# Patient Record
Sex: MALE | Race: WHITE | NOT HISPANIC OR LATINO | URBAN - METROPOLITAN AREA
[De-identification: names, ages, dates, MRNs, and addresses within clinical notes are randomized per-mention and may not be internally consistent; named-entity substitution may affect disease eponyms.]

---

## 2023-07-18 ENCOUNTER — TELEPHONE (OUTPATIENT)
Dept: EMERGENCY MEDICINE | Facility: HOSPITAL | Age: 53
End: 2023-07-18

## 2023-07-18 DIAGNOSIS — Z00.00 ANNUAL PHYSICAL EXAM: Primary | ICD-10-CM

## 2023-10-09 NOTE — PROGRESS NOTES
"Subjective:       Patient ID: Chris Nava is a 53 y.o. male.    Chief Complaint: Establish Care, Referral (Pain management ), and Arthritis (Lt Hip & both shoulder )    Chris Nava is a 53 y.o. male who presents today to establish care.     Patient around the new year was 405 lbs and is now down to 328 lbs through diet. He was started on metformin and ozempic by a weight loss clinic in the community. He finds this has not aided in weight loss as much as his dietary restrictions. Finds exercise difficult and typically ambulates with cane due to "bone on bone" arthritis in his left hip and shoulders. He has been seeing Dr. Warren in the community but he is not a candidate for surgery until he is down to 270 lbs. He is currently in [facility]. He takes Cymbalta for pain 80 mg per day and Mobic. Notes he is ingesting marijuana of an evening to aid in pain control and sleep. He is a .     Past Medical History:   Diagnosis Date    Arthritis     Kidney stones      Past Surgical History:   Procedure Laterality Date    CYSTOSCOPY W/ LASER LITHOTRIPSY      VASECTOMY       Social History     Socioeconomic History    Marital status:    Tobacco Use    Smoking status: Never     Passive exposure: Never    Smokeless tobacco: Never   Substance and Sexual Activity    Alcohol use: Yes     Comment: Maybe wine at dinner 1x per week, maybe bourbon 1 night p/wk    Drug use: Yes     Frequency: 7.0 times per week     Types: Marijuana     Comment: Sleep aid only for arthritic purposes. Use for 2 months only    Sexual activity: Yes     Partners: Female     Birth control/protection: Post-menopausal     Comment: I have a vasectomy, she is post menopausal     Family History   Problem Relation Age of Onset    Arthritis Father     Cancer Father     Hearing loss Father     Hypertension Father     Cancer Maternal Grandmother     Cancer Paternal Grandfather     Arthritis Paternal Grandmother  " "   Stroke Paternal Grandmother     Asthma Daughter     Hearing loss Maternal Uncle     Hypertension Brother     Hypertension Brother          Health Maintenance    Flu Vaccine: 10/11/2023   Tetanus/Tdap: 10/11/2023   Hepatitis C Screen: 10/11/2023   HIV Screen: Declined   PSA: 10/11/2023       Review of patient's allergies indicates:  No Known Allergies    Review of Systems   Constitutional:  Negative for chills and fever.   Respiratory:  Negative for cough and shortness of breath.    Cardiovascular:  Negative for chest pain.   Musculoskeletal:  Positive for joint pain.   Neurological:  Negative for dizziness and headaches.     Objective:   /86 (BP Location: Right arm, Patient Position: Sitting, BP Method: Large (Manual))   Pulse 71   Temp 96.4 °F (35.8 °C) (Temporal)   Resp 18   Ht 5' 9" (1.753 m)   Wt (!) 148.8 kg (328 lb)   SpO2 96%   BMI 48.44 kg/m²     Physical Exam  Vitals reviewed.   Constitutional:       Appearance: Normal appearance.   HENT:      Head: Normocephalic and atraumatic.   Cardiovascular:      Rate and Rhythm: Normal rate and regular rhythm.      Heart sounds: Normal heart sounds. No murmur heard.  Pulmonary:      Effort: Pulmonary effort is normal.      Breath sounds: Normal breath sounds. No wheezing.   Skin:     General: Skin is warm and dry.   Neurological:      Mental Status: He is alert and oriented to person, place, and time.       Assessment and Plan:       1. Encounter for annual health examination    --Nutrition: Discussed the importance of moderate caffeine intake. Adhering to a low sodium, low saturated fat/low cholesterol diet.   --Exercise: Discussed the importance of regular exercise. At least 30 minutes 5 days per week.   --Substance Abuse: Discussed cessation and resources available to assist.  --Immunizations reviewed.  --Discussed benefits of maintaining up to date health maintenance.    - Hepatitis C Antibody; Future  - CBC Auto Differential; Future  - " Comprehensive Metabolic Panel; Future  - Lipid Panel; Future  - TSH; Future  - PSA, Screening; Future  - Hemoglobin A1C; Future    2. Arthritis    Chronic, pain not well controlled. Needs surgery but not a candidate at this time due to weight. Continue Cymbalta and Mobic.      - Ambulatory referral/consult to Pain Clinic; Future  - DULoxetine (CYMBALTA) 60 MG capsule; Take 1 capsule (60 mg total) by mouth once daily.  Dispense: 90 capsule; Refill: 3  - DULoxetine (CYMBALTA) 20 MG capsule; Take 1 capsule (20 mg total) by mouth once daily.  Dispense: 90 capsule; Refill: 3    3. Class 3 severe obesity without serious comorbidity with body mass index (BMI) of 45.0 to 49.9 in adult, unspecified obesity type    Chronic, stable, continue current medications, diet and exercise     - Hemoglobin A1C; Future    4. Personal history of kidney stones    Chronic, stable, continue current medications.  Patient on Allopurinol, Lisinopril and Potassium Citrate for Uric Acid stones.     5. Need for vaccination    - (In Office Administered) Td Vaccine - Preservative Free    6. Encounter for colorectal cancer screening    - Ambulatory referral/consult to Endo Procedure ; Future

## 2023-10-11 ENCOUNTER — LAB VISIT (OUTPATIENT)
Dept: LAB | Facility: HOSPITAL | Age: 53
End: 2023-10-11
Attending: NURSE PRACTITIONER
Payer: COMMERCIAL

## 2023-10-11 ENCOUNTER — OFFICE VISIT (OUTPATIENT)
Dept: INTERNAL MEDICINE | Facility: CLINIC | Age: 53
End: 2023-10-11
Payer: COMMERCIAL

## 2023-10-11 VITALS
HEIGHT: 69 IN | DIASTOLIC BLOOD PRESSURE: 86 MMHG | HEART RATE: 71 BPM | RESPIRATION RATE: 18 BRPM | TEMPERATURE: 96 F | BODY MASS INDEX: 46.65 KG/M2 | SYSTOLIC BLOOD PRESSURE: 124 MMHG | WEIGHT: 315 LBS | OXYGEN SATURATION: 96 %

## 2023-10-11 DIAGNOSIS — Z12.11 ENCOUNTER FOR COLORECTAL CANCER SCREENING: ICD-10-CM

## 2023-10-11 DIAGNOSIS — Z87.442 PERSONAL HISTORY OF KIDNEY STONES: ICD-10-CM

## 2023-10-11 DIAGNOSIS — Z12.12 ENCOUNTER FOR COLORECTAL CANCER SCREENING: ICD-10-CM

## 2023-10-11 DIAGNOSIS — Z00.00 ENCOUNTER FOR ANNUAL HEALTH EXAMINATION: ICD-10-CM

## 2023-10-11 DIAGNOSIS — M19.90 ARTHRITIS: ICD-10-CM

## 2023-10-11 DIAGNOSIS — Z23 NEED FOR VACCINATION: ICD-10-CM

## 2023-10-11 DIAGNOSIS — E66.01 CLASS 3 SEVERE OBESITY WITHOUT SERIOUS COMORBIDITY WITH BODY MASS INDEX (BMI) OF 45.0 TO 49.9 IN ADULT, UNSPECIFIED OBESITY TYPE: ICD-10-CM

## 2023-10-11 DIAGNOSIS — Z00.00 ENCOUNTER FOR ANNUAL HEALTH EXAMINATION: Primary | ICD-10-CM

## 2023-10-11 PROBLEM — E66.813 CLASS 3 SEVERE OBESITY WITHOUT SERIOUS COMORBIDITY WITH BODY MASS INDEX (BMI) OF 45.0 TO 49.9 IN ADULT: Status: ACTIVE | Noted: 2023-10-11

## 2023-10-11 LAB
ALBUMIN SERPL BCP-MCNC: 3.8 G/DL (ref 3.5–5.2)
ALP SERPL-CCNC: 68 U/L (ref 55–135)
ALT SERPL W/O P-5'-P-CCNC: 25 U/L (ref 10–44)
ANION GAP SERPL CALC-SCNC: 6 MMOL/L (ref 8–16)
AST SERPL-CCNC: 21 U/L (ref 10–40)
BASOPHILS # BLD AUTO: 0.04 K/UL (ref 0–0.2)
BASOPHILS NFR BLD: 0.5 % (ref 0–1.9)
BILIRUB SERPL-MCNC: 0.9 MG/DL (ref 0.1–1)
BUN SERPL-MCNC: 15 MG/DL (ref 6–20)
CALCIUM SERPL-MCNC: 9.3 MG/DL (ref 8.7–10.5)
CHLORIDE SERPL-SCNC: 105 MMOL/L (ref 95–110)
CHOLEST SERPL-MCNC: 187 MG/DL (ref 120–199)
CHOLEST/HDLC SERPL: 4.3 {RATIO} (ref 2–5)
CO2 SERPL-SCNC: 26 MMOL/L (ref 23–29)
COMPLEXED PSA SERPL-MCNC: 2.3 NG/ML (ref 0–4)
CREAT SERPL-MCNC: 0.8 MG/DL (ref 0.5–1.4)
DIFFERENTIAL METHOD: ABNORMAL
EOSINOPHIL # BLD AUTO: 0.3 K/UL (ref 0–0.5)
EOSINOPHIL NFR BLD: 3.1 % (ref 0–8)
ERYTHROCYTE [DISTWIDTH] IN BLOOD BY AUTOMATED COUNT: 15 % (ref 11.5–14.5)
EST. GFR  (NO RACE VARIABLE): >60 ML/MIN/1.73 M^2
ESTIMATED AVG GLUCOSE: 100 MG/DL (ref 68–131)
GLUCOSE SERPL-MCNC: 89 MG/DL (ref 70–110)
HBA1C MFR BLD: 5.1 % (ref 4–5.6)
HCT VFR BLD AUTO: 53.4 % (ref 40–54)
HCV AB SERPL QL IA: NORMAL
HDLC SERPL-MCNC: 44 MG/DL (ref 40–75)
HDLC SERPL: 23.5 % (ref 20–50)
HGB BLD-MCNC: 16.6 G/DL (ref 14–18)
IMM GRANULOCYTES # BLD AUTO: 0.04 K/UL (ref 0–0.04)
IMM GRANULOCYTES NFR BLD AUTO: 0.5 % (ref 0–0.5)
LDLC SERPL CALC-MCNC: 127 MG/DL (ref 63–159)
LYMPHOCYTES # BLD AUTO: 2 K/UL (ref 1–4.8)
LYMPHOCYTES NFR BLD: 22.8 % (ref 18–48)
MCH RBC QN AUTO: 27.7 PG (ref 27–31)
MCHC RBC AUTO-ENTMCNC: 31.1 G/DL (ref 32–36)
MCV RBC AUTO: 89 FL (ref 82–98)
MONOCYTES # BLD AUTO: 0.7 K/UL (ref 0.3–1)
MONOCYTES NFR BLD: 8.4 % (ref 4–15)
NEUTROPHILS # BLD AUTO: 5.7 K/UL (ref 1.8–7.7)
NEUTROPHILS NFR BLD: 64.7 % (ref 38–73)
NONHDLC SERPL-MCNC: 143 MG/DL
NRBC BLD-RTO: 0 /100 WBC
PLATELET # BLD AUTO: 250 K/UL (ref 150–450)
PMV BLD AUTO: 10.6 FL (ref 9.2–12.9)
POTASSIUM SERPL-SCNC: 4.5 MMOL/L (ref 3.5–5.1)
PROT SERPL-MCNC: 6.9 G/DL (ref 6–8.4)
RBC # BLD AUTO: 6 M/UL (ref 4.6–6.2)
SODIUM SERPL-SCNC: 137 MMOL/L (ref 136–145)
TRIGL SERPL-MCNC: 80 MG/DL (ref 30–150)
TSH SERPL DL<=0.005 MIU/L-ACNC: 0.9 UIU/ML (ref 0.4–4)
WBC # BLD AUTO: 8.78 K/UL (ref 3.9–12.7)

## 2023-10-11 PROCEDURE — 99999 PR PBB SHADOW E&M-EST. PATIENT-LVL V: ICD-10-PCS | Mod: PBBFAC,,, | Performed by: NURSE PRACTITIONER

## 2023-10-11 PROCEDURE — 86803 HEPATITIS C AB TEST: CPT | Performed by: NURSE PRACTITIONER

## 2023-10-11 PROCEDURE — 90714 TD VACCINE GREATER THAN OR EQUAL TO 7YO PRESERVATIVE FREE IM: ICD-10-PCS | Mod: S$GLB,,, | Performed by: NURSE PRACTITIONER

## 2023-10-11 PROCEDURE — 3079F DIAST BP 80-89 MM HG: CPT | Mod: CPTII,S$GLB,, | Performed by: NURSE PRACTITIONER

## 2023-10-11 PROCEDURE — 3079F PR MOST RECENT DIASTOLIC BLOOD PRESSURE 80-89 MM HG: ICD-10-PCS | Mod: CPTII,S$GLB,, | Performed by: NURSE PRACTITIONER

## 2023-10-11 PROCEDURE — 90472 TD VACCINE GREATER THAN OR EQUAL TO 7YO PRESERVATIVE FREE IM: ICD-10-PCS | Mod: S$GLB,,, | Performed by: NURSE PRACTITIONER

## 2023-10-11 PROCEDURE — 99386 PREV VISIT NEW AGE 40-64: CPT | Mod: 25,S$GLB,, | Performed by: NURSE PRACTITIONER

## 2023-10-11 PROCEDURE — 36415 COLL VENOUS BLD VENIPUNCTURE: CPT | Mod: PO | Performed by: NURSE PRACTITIONER

## 2023-10-11 PROCEDURE — 4010F PR ACE/ARB THEARPY RXD/TAKEN: ICD-10-PCS | Mod: CPTII,S$GLB,, | Performed by: NURSE PRACTITIONER

## 2023-10-11 PROCEDURE — 99386 PR PREVENTIVE VISIT,NEW,40-64: ICD-10-PCS | Mod: 25,S$GLB,, | Performed by: NURSE PRACTITIONER

## 2023-10-11 PROCEDURE — 3008F BODY MASS INDEX DOCD: CPT | Mod: CPTII,S$GLB,, | Performed by: NURSE PRACTITIONER

## 2023-10-11 PROCEDURE — 1160F PR REVIEW ALL MEDS BY PRESCRIBER/CLIN PHARMACIST DOCUMENTED: ICD-10-PCS | Mod: CPTII,S$GLB,, | Performed by: NURSE PRACTITIONER

## 2023-10-11 PROCEDURE — 90686 IIV4 VACC NO PRSV 0.5 ML IM: CPT | Mod: S$GLB,,, | Performed by: NURSE PRACTITIONER

## 2023-10-11 PROCEDURE — 1159F MED LIST DOCD IN RCRD: CPT | Mod: CPTII,S$GLB,, | Performed by: NURSE PRACTITIONER

## 2023-10-11 PROCEDURE — 99999 PR PBB SHADOW E&M-EST. PATIENT-LVL V: CPT | Mod: PBBFAC,,, | Performed by: NURSE PRACTITIONER

## 2023-10-11 PROCEDURE — 90471 FLU VACCINE (QUAD) GREATER THAN OR EQUAL TO 3YO PRESERVATIVE FREE IM: ICD-10-PCS | Mod: S$GLB,,, | Performed by: NURSE PRACTITIONER

## 2023-10-11 PROCEDURE — 80061 LIPID PANEL: CPT | Performed by: NURSE PRACTITIONER

## 2023-10-11 PROCEDURE — 4010F ACE/ARB THERAPY RXD/TAKEN: CPT | Mod: CPTII,S$GLB,, | Performed by: NURSE PRACTITIONER

## 2023-10-11 PROCEDURE — 90686 FLU VACCINE (QUAD) GREATER THAN OR EQUAL TO 3YO PRESERVATIVE FREE IM: ICD-10-PCS | Mod: S$GLB,,, | Performed by: NURSE PRACTITIONER

## 2023-10-11 PROCEDURE — 90471 IMMUNIZATION ADMIN: CPT | Mod: S$GLB,,, | Performed by: NURSE PRACTITIONER

## 2023-10-11 PROCEDURE — 1159F PR MEDICATION LIST DOCUMENTED IN MEDICAL RECORD: ICD-10-PCS | Mod: CPTII,S$GLB,, | Performed by: NURSE PRACTITIONER

## 2023-10-11 PROCEDURE — 80053 COMPREHEN METABOLIC PANEL: CPT | Performed by: NURSE PRACTITIONER

## 2023-10-11 PROCEDURE — 3008F PR BODY MASS INDEX (BMI) DOCUMENTED: ICD-10-PCS | Mod: CPTII,S$GLB,, | Performed by: NURSE PRACTITIONER

## 2023-10-11 PROCEDURE — 3074F PR MOST RECENT SYSTOLIC BLOOD PRESSURE < 130 MM HG: ICD-10-PCS | Mod: CPTII,S$GLB,, | Performed by: NURSE PRACTITIONER

## 2023-10-11 PROCEDURE — 85025 COMPLETE CBC W/AUTO DIFF WBC: CPT | Performed by: NURSE PRACTITIONER

## 2023-10-11 PROCEDURE — 84443 ASSAY THYROID STIM HORMONE: CPT | Performed by: NURSE PRACTITIONER

## 2023-10-11 PROCEDURE — 3074F SYST BP LT 130 MM HG: CPT | Mod: CPTII,S$GLB,, | Performed by: NURSE PRACTITIONER

## 2023-10-11 PROCEDURE — 90714 TD VACC NO PRESV 7 YRS+ IM: CPT | Mod: S$GLB,,, | Performed by: NURSE PRACTITIONER

## 2023-10-11 PROCEDURE — 90472 IMMUNIZATION ADMIN EACH ADD: CPT | Mod: S$GLB,,, | Performed by: NURSE PRACTITIONER

## 2023-10-11 PROCEDURE — 83036 HEMOGLOBIN GLYCOSYLATED A1C: CPT | Performed by: NURSE PRACTITIONER

## 2023-10-11 PROCEDURE — 1160F RVW MEDS BY RX/DR IN RCRD: CPT | Mod: CPTII,S$GLB,, | Performed by: NURSE PRACTITIONER

## 2023-10-11 PROCEDURE — 84153 ASSAY OF PSA TOTAL: CPT | Performed by: NURSE PRACTITIONER

## 2023-10-11 RX ORDER — ALLOPURINOL 100 MG/1
200 TABLET ORAL NIGHTLY
COMMUNITY
Start: 2023-08-28

## 2023-10-11 RX ORDER — DULOXETIN HYDROCHLORIDE 20 MG/1
20 CAPSULE, DELAYED RELEASE ORAL DAILY
Qty: 90 CAPSULE | Refills: 3 | Status: SHIPPED | OUTPATIENT
Start: 2023-10-11 | End: 2024-10-10

## 2023-10-11 RX ORDER — METFORMIN HYDROCHLORIDE 500 MG/5ML
SOLUTION ORAL
COMMUNITY
End: 2023-10-11

## 2023-10-11 RX ORDER — DULOXETIN HYDROCHLORIDE 30 MG/1
30 CAPSULE, DELAYED RELEASE ORAL
COMMUNITY
Start: 2023-05-09 | End: 2023-10-11

## 2023-10-11 RX ORDER — DULOXETIN HYDROCHLORIDE 60 MG/1
60 CAPSULE, DELAYED RELEASE ORAL DAILY
Qty: 90 CAPSULE | Refills: 3 | Status: SHIPPED | OUTPATIENT
Start: 2023-10-11 | End: 2024-10-10

## 2023-10-11 RX ORDER — MELOXICAM 15 MG/1
15 TABLET ORAL
COMMUNITY
Start: 2023-05-09 | End: 2023-10-18

## 2023-10-11 RX ORDER — METFORMIN HYDROCHLORIDE 500 MG/1
500 TABLET, EXTENDED RELEASE ORAL EVERY MORNING
COMMUNITY
Start: 2023-09-05

## 2023-10-11 RX ORDER — LISINOPRIL 20 MG/1
20 TABLET ORAL NIGHTLY
COMMUNITY
Start: 2023-08-20

## 2023-10-11 RX ORDER — POTASSIUM CITRATE 15 MEQ/1
1 TABLET, EXTENDED RELEASE ORAL NIGHTLY
COMMUNITY
Start: 2023-08-28

## 2023-10-12 ENCOUNTER — PATIENT MESSAGE (OUTPATIENT)
Dept: INTERNAL MEDICINE | Facility: CLINIC | Age: 53
End: 2023-10-12
Payer: COMMERCIAL

## 2023-10-18 ENCOUNTER — OFFICE VISIT (OUTPATIENT)
Dept: PAIN MEDICINE | Facility: CLINIC | Age: 53
End: 2023-10-18
Payer: COMMERCIAL

## 2023-10-18 VITALS
HEIGHT: 69 IN | WEIGHT: 315 LBS | HEART RATE: 88 BPM | DIASTOLIC BLOOD PRESSURE: 86 MMHG | SYSTOLIC BLOOD PRESSURE: 129 MMHG | BODY MASS INDEX: 46.65 KG/M2 | OXYGEN SATURATION: 100 % | RESPIRATION RATE: 18 BRPM

## 2023-10-18 DIAGNOSIS — E66.9 OBESITY, UNSPECIFIED CLASSIFICATION, UNSPECIFIED OBESITY TYPE, UNSPECIFIED WHETHER SERIOUS COMORBIDITY PRESENT: ICD-10-CM

## 2023-10-18 DIAGNOSIS — M16.12 PRIMARY OSTEOARTHRITIS OF LEFT HIP: Primary | ICD-10-CM

## 2023-10-18 PROCEDURE — 3044F HG A1C LEVEL LT 7.0%: CPT | Mod: CPTII,S$GLB,, | Performed by: ANESTHESIOLOGY

## 2023-10-18 PROCEDURE — 99999 PR PBB SHADOW E&M-EST. PATIENT-LVL IV: CPT | Mod: PBBFAC,,, | Performed by: ANESTHESIOLOGY

## 2023-10-18 PROCEDURE — 3074F PR MOST RECENT SYSTOLIC BLOOD PRESSURE < 130 MM HG: ICD-10-PCS | Mod: CPTII,S$GLB,, | Performed by: ANESTHESIOLOGY

## 2023-10-18 PROCEDURE — 1160F RVW MEDS BY RX/DR IN RCRD: CPT | Mod: CPTII,S$GLB,, | Performed by: ANESTHESIOLOGY

## 2023-10-18 PROCEDURE — 3008F BODY MASS INDEX DOCD: CPT | Mod: CPTII,S$GLB,, | Performed by: ANESTHESIOLOGY

## 2023-10-18 PROCEDURE — 1160F PR REVIEW ALL MEDS BY PRESCRIBER/CLIN PHARMACIST DOCUMENTED: ICD-10-PCS | Mod: CPTII,S$GLB,, | Performed by: ANESTHESIOLOGY

## 2023-10-18 PROCEDURE — 4010F PR ACE/ARB THEARPY RXD/TAKEN: ICD-10-PCS | Mod: CPTII,S$GLB,, | Performed by: ANESTHESIOLOGY

## 2023-10-18 PROCEDURE — 3079F PR MOST RECENT DIASTOLIC BLOOD PRESSURE 80-89 MM HG: ICD-10-PCS | Mod: CPTII,S$GLB,, | Performed by: ANESTHESIOLOGY

## 2023-10-18 PROCEDURE — 99204 PR OFFICE/OUTPT VISIT, NEW, LEVL IV, 45-59 MIN: ICD-10-PCS | Mod: S$GLB,,, | Performed by: ANESTHESIOLOGY

## 2023-10-18 PROCEDURE — 1159F MED LIST DOCD IN RCRD: CPT | Mod: CPTII,S$GLB,, | Performed by: ANESTHESIOLOGY

## 2023-10-18 PROCEDURE — 3079F DIAST BP 80-89 MM HG: CPT | Mod: CPTII,S$GLB,, | Performed by: ANESTHESIOLOGY

## 2023-10-18 PROCEDURE — 3074F SYST BP LT 130 MM HG: CPT | Mod: CPTII,S$GLB,, | Performed by: ANESTHESIOLOGY

## 2023-10-18 PROCEDURE — 4010F ACE/ARB THERAPY RXD/TAKEN: CPT | Mod: CPTII,S$GLB,, | Performed by: ANESTHESIOLOGY

## 2023-10-18 PROCEDURE — 99204 OFFICE O/P NEW MOD 45 MIN: CPT | Mod: S$GLB,,, | Performed by: ANESTHESIOLOGY

## 2023-10-18 PROCEDURE — 3008F PR BODY MASS INDEX (BMI) DOCUMENTED: ICD-10-PCS | Mod: CPTII,S$GLB,, | Performed by: ANESTHESIOLOGY

## 2023-10-18 PROCEDURE — 1159F PR MEDICATION LIST DOCUMENTED IN MEDICAL RECORD: ICD-10-PCS | Mod: CPTII,S$GLB,, | Performed by: ANESTHESIOLOGY

## 2023-10-18 PROCEDURE — 99999 PR PBB SHADOW E&M-EST. PATIENT-LVL IV: ICD-10-PCS | Mod: PBBFAC,,, | Performed by: ANESTHESIOLOGY

## 2023-10-18 PROCEDURE — 3044F PR MOST RECENT HEMOGLOBIN A1C LEVEL <7.0%: ICD-10-PCS | Mod: CPTII,S$GLB,, | Performed by: ANESTHESIOLOGY

## 2023-10-18 RX ORDER — CELECOXIB 200 MG/1
200 CAPSULE ORAL DAILY
Qty: 30 CAPSULE | Refills: 1 | Status: SHIPPED | OUTPATIENT
Start: 2023-10-18 | End: 2023-12-19 | Stop reason: SDUPTHER

## 2023-10-18 NOTE — PROGRESS NOTES
PCP: Shira Rodríguez NP    REFERRING PHYSICIAN: Shira Rodríguez NP    CHIEF COMPLAINT: Left Hip Pain    Original HISTORY OF PRESENT ILLNESS: Chris Nava presents to the clinic for the evaluation of the above pain. The pain started approximately 6 years ago after no particular event.     Original Pain Description:  The pain is located in the left groin. The pain is described as aching and stabbing. Exacerbating factors: Walking. Mitigating factors sitting. Symptoms interfere with daily activity. The patient feels like symptoms have been worsening. Patient denies night fever/night sweats, urinary incontinence, bowel incontinence, and significant motor weakness.    Original PAIN SCORES:  Best: Pain is 1  Worst: Pain is 10  Current: Pain is 1        10/18/2023     5:03 PM   Last 3 PDI Scores   Pain Disability Index (PDI) 34       INTERVAL HISTORY: (Newest visit at the bottom)   Interval History (Date):       6 weeks of Conservative therapy:  PT: Sept-Oct, 2023 (Must include dates)  Chiro:  HEP:       Treatments / Medications: (Ice/Heat/NSAIDS/APAP/etc):  Ice  Heat  APAP  Aleve  Ibuprofen  Tramadol  Hydrocodone  Cymbalta - helps  Meloxicam 15 QAM  Marijuana - helps    Interventional Pain Procedures: (Previous injections)      Past Medical History:   Diagnosis Date    Arthritis     Kidney stones      Past Surgical History:   Procedure Laterality Date    CYSTOSCOPY W/ LASER LITHOTRIPSY      VASECTOMY       Social History     Socioeconomic History    Marital status:    Tobacco Use    Smoking status: Never     Passive exposure: Never    Smokeless tobacco: Never   Substance and Sexual Activity    Alcohol use: Yes     Comment: Maybe wine at dinner 1x per week, maybe bourbon 1 night p/wk    Drug use: Yes     Frequency: 7.0 times per week     Types: Marijuana     Comment: Sleep aid only for arthritic purposes. Use for 2 months only    Sexual activity: Yes     Partners: Female     Birth  control/protection: Post-menopausal     Comment: I have a vasectomy, she is post menopausal     Family History   Problem Relation Age of Onset    Arthritis Father     Cancer Father     Hearing loss Father     Hypertension Father     Cancer Maternal Grandmother     Cancer Paternal Grandfather     Arthritis Paternal Grandmother     Stroke Paternal Grandmother     Asthma Daughter     Hearing loss Maternal Uncle     Hypertension Brother     Hypertension Brother        Review of patient's allergies indicates:  No Known Allergies    Current Outpatient Medications   Medication Sig    allopurinoL (ZYLOPRIM) 100 MG tablet Take 200 mg by mouth.    DULoxetine (CYMBALTA) 20 MG capsule Take 1 capsule (20 mg total) by mouth once daily.    DULoxetine (CYMBALTA) 60 MG capsule Take 1 capsule (60 mg total) by mouth once daily.    lisinopriL (PRINIVIL,ZESTRIL) 20 MG tablet Take 20 mg by mouth.    meloxicam (MOBIC) 15 MG tablet Take 15 mg by mouth.    metFORMIN (GLUCOPHAGE-XR) 500 MG ER 24hr tablet Take 500 mg by mouth every morning.    multivit-min/ferrous fumarate (MULTI VITAMIN ORAL)     potassium citrate (UROCIT-K 15) 15 mEq TbSR Take 1 tablet by mouth once daily.    semaglutide (OZEMPIC SUBQ) Inject 0.75 mg into the skin once a week.     No current facility-administered medications for this visit.       ROS:  GENERAL: No fever. No chills. No fatigue. Denies weight loss. Denies weight gain.  HEENT: Denies headaches. Denies vision change. Denies eye pain. Denies double vision. Denies ear pain.   CV: Denies chest pain.   PULM: Denies of shortness of breath.  GI: Denies constipation. No diarrhea. No abdominal pain. Denies nausea. Denies vomiting. No blood in stool.  HEME: Denies bleeding problems.  : Denies urgency. No painful urination. No blood in urine.  MS: + Hip pain. +Shoulder pain.   SKIN: Denies rash.   NEURO: Denies seizures. No weakness.  PSYCH:  Denies difficulty sleeping. No anxiety. Denies depression. No suicidal  "thoughts.       VITALS:   Vitals:    10/18/23 1703   BP: 129/86   Pulse: 88   Resp: 18   SpO2: 100%   Weight: (!) 149.1 kg (328 lb 11.3 oz)   Height: 5' 9" (1.753 m)   PainSc: 0-No pain         PHYSICAL EXAM:   GENERAL: Well appearing, in no acute distress, alert and oriented x3.  PSYCH:  Mood and affect appropriate.  SKIN: Skin color, texture, turgor normal, no rashes or lesions.  HEENT:  Normocephalic, atraumatic. Cranial nerves grossly intact.  PULM: No evidence of respiratory difficulty, symmetric chest rise.  GI:  Non-distended  BACK: Normal range of motion. No pain to palpation over the spinous processes. No pain to palpation over facet joints. There is no pain with palpation over the sacroiliac joints bilaterally.   EXTREMITIES: No deformities, edema, or skin discoloration.   MUSCULOSKELETAL: Pain reproduced with left hip log roll. Pain with left GTB palpation as well.   NEURO: Sensation is equal and appropriate bilaterally. Bilateral upper and lower extremity strength is normal and symmetric. Bilateral upper and lower extremity coordination and muscle stretch reflexes are physiologic and symmetric. Plantar response are downgoing. Straight leg raising in the supine position is negative to radicular pain.   GAIT: Antalgic on left leg.      LABS:      IMAGING:        ASSESSMENT: 53 y.o. year old male with pain, consistent with:    Encounter Diagnosis   Name Primary?    Arthritis        DISCUSSION: Chris Nava is a  for [employer] who comes to us with left hip pain which is worst with standing and walking. Imaging shows severe changes in his hip (on his phone). Exam shows pain reproduced with hip testing.       PLAN:  Continue Cymbalta  Continue weight loss   Discuss left hip steroid injection with orthopaedics.  Will schedule left hip steroid injection when cleared by orthopaedics  Trial Celebrex 200 QD  Follow up in 2 weeks      I would like to thank Shira Rodríguez, YVETTE for the opportunity to " assist in the care of this patient. We had a very nice visit and I look forward to continuing their care. Please let me know if I can be of further assistance.     Sofy Ibarra  10/18/2023

## 2023-11-01 ENCOUNTER — OFFICE VISIT (OUTPATIENT)
Dept: PAIN MEDICINE | Facility: CLINIC | Age: 53
End: 2023-11-01
Payer: COMMERCIAL

## 2023-11-01 VITALS
HEIGHT: 69 IN | BODY MASS INDEX: 46.65 KG/M2 | OXYGEN SATURATION: 100 % | SYSTOLIC BLOOD PRESSURE: 128 MMHG | WEIGHT: 315 LBS | DIASTOLIC BLOOD PRESSURE: 83 MMHG | RESPIRATION RATE: 18 BRPM | HEART RATE: 79 BPM

## 2023-11-01 DIAGNOSIS — M54.9 DORSALGIA, UNSPECIFIED: ICD-10-CM

## 2023-11-01 DIAGNOSIS — M16.12 PRIMARY OSTEOARTHRITIS OF LEFT HIP: Primary | ICD-10-CM

## 2023-11-01 DIAGNOSIS — E66.9 OBESITY, UNSPECIFIED CLASSIFICATION, UNSPECIFIED OBESITY TYPE, UNSPECIFIED WHETHER SERIOUS COMORBIDITY PRESENT: ICD-10-CM

## 2023-11-01 PROCEDURE — 3044F PR MOST RECENT HEMOGLOBIN A1C LEVEL <7.0%: ICD-10-PCS | Mod: CPTII,S$GLB,, | Performed by: ANESTHESIOLOGY

## 2023-11-01 PROCEDURE — 3008F PR BODY MASS INDEX (BMI) DOCUMENTED: ICD-10-PCS | Mod: CPTII,S$GLB,, | Performed by: ANESTHESIOLOGY

## 2023-11-01 PROCEDURE — 99999 PR PBB SHADOW E&M-EST. PATIENT-LVL IV: ICD-10-PCS | Mod: PBBFAC,,, | Performed by: ANESTHESIOLOGY

## 2023-11-01 PROCEDURE — 3074F SYST BP LT 130 MM HG: CPT | Mod: CPTII,S$GLB,, | Performed by: ANESTHESIOLOGY

## 2023-11-01 PROCEDURE — 1159F MED LIST DOCD IN RCRD: CPT | Mod: CPTII,S$GLB,, | Performed by: ANESTHESIOLOGY

## 2023-11-01 PROCEDURE — 99999 PR PBB SHADOW E&M-EST. PATIENT-LVL IV: CPT | Mod: PBBFAC,,, | Performed by: ANESTHESIOLOGY

## 2023-11-01 PROCEDURE — 3008F BODY MASS INDEX DOCD: CPT | Mod: CPTII,S$GLB,, | Performed by: ANESTHESIOLOGY

## 2023-11-01 PROCEDURE — 1159F PR MEDICATION LIST DOCUMENTED IN MEDICAL RECORD: ICD-10-PCS | Mod: CPTII,S$GLB,, | Performed by: ANESTHESIOLOGY

## 2023-11-01 PROCEDURE — 3079F DIAST BP 80-89 MM HG: CPT | Mod: CPTII,S$GLB,, | Performed by: ANESTHESIOLOGY

## 2023-11-01 PROCEDURE — 4010F ACE/ARB THERAPY RXD/TAKEN: CPT | Mod: CPTII,S$GLB,, | Performed by: ANESTHESIOLOGY

## 2023-11-01 PROCEDURE — 3044F HG A1C LEVEL LT 7.0%: CPT | Mod: CPTII,S$GLB,, | Performed by: ANESTHESIOLOGY

## 2023-11-01 PROCEDURE — 4010F PR ACE/ARB THEARPY RXD/TAKEN: ICD-10-PCS | Mod: CPTII,S$GLB,, | Performed by: ANESTHESIOLOGY

## 2023-11-01 PROCEDURE — 3079F PR MOST RECENT DIASTOLIC BLOOD PRESSURE 80-89 MM HG: ICD-10-PCS | Mod: CPTII,S$GLB,, | Performed by: ANESTHESIOLOGY

## 2023-11-01 PROCEDURE — 3074F PR MOST RECENT SYSTOLIC BLOOD PRESSURE < 130 MM HG: ICD-10-PCS | Mod: CPTII,S$GLB,, | Performed by: ANESTHESIOLOGY

## 2023-11-01 PROCEDURE — 99214 OFFICE O/P EST MOD 30 MIN: CPT | Mod: S$GLB,,, | Performed by: ANESTHESIOLOGY

## 2023-11-01 PROCEDURE — 99214 PR OFFICE/OUTPT VISIT, EST, LEVL IV, 30-39 MIN: ICD-10-PCS | Mod: S$GLB,,, | Performed by: ANESTHESIOLOGY

## 2023-11-01 NOTE — PROGRESS NOTES
PCP: Shira Rodríguez NP    REFERRING PHYSICIAN: No ref. provider found    CHIEF COMPLAINT: Left Hip Pain    Original HISTORY OF PRESENT ILLNESS: Chris Nava presents to the clinic for the evaluation of the above pain. The pain started approximately 6 years ago after no particular event.     Original Pain Description:  The pain is located in the left groin. The pain is described as aching and stabbing. Exacerbating factors: Walking. Mitigating factors sitting. Symptoms interfere with daily activity. The patient feels like symptoms have been worsening. Patient denies night fever/night sweats, urinary incontinence, bowel incontinence, and significant motor weakness.    Original PAIN SCORES:  Best: Pain is 1  Worst: Pain is 10  Current: Pain is 1        11/1/2023     1:11 PM   Last 3 PDI Scores   Pain Disability Index (PDI) 40       INTERVAL HISTORY: (Newest visit at the bottom)     Interval History 11/1/23: Chris Nava is here for follow up. At last visit we planned to trial Celebrex and discuss with orthopaedics about left hip steroid injection. He states that he has not noticed significant difference since starting Celebrex. He states that while at work he experiences bilateral leg numbness within 20 minutes of standing and worries about falling due to loss of feeling. This numbness is relieved with leaning forward. He states he had an MRI ~5 years ago, and was told that he had bulging disc and degenerative disease. Denies fevers, night sweats, unintended weight loss, loss of bowel or bladder incontinence.       6 weeks of Conservative therapy:  PT: Sept-Oct, 2023 with focus on shoulders.  Chiro:  HEP:       Treatments / Medications: (Ice/Heat/NSAIDS/APAP/etc):  Ice  Heat  APAP  Cymbalta - helps  Celebrex  Marijuana QHS- helps    Interventional Pain Procedures: Not on file. Patient notes a joint injection from an out of state orthopaedist with no relief.       Past Medical History:    Diagnosis Date    Arthritis     Kidney stones      Past Surgical History:   Procedure Laterality Date    CYSTOSCOPY W/ LASER LITHOTRIPSY      VASECTOMY       Social History     Socioeconomic History    Marital status:    Tobacco Use    Smoking status: Never     Passive exposure: Never    Smokeless tobacco: Never   Substance and Sexual Activity    Alcohol use: Yes     Comment: Maybe wine at dinner 1x per week, maybe bourbon 1 night p/wk    Drug use: Yes     Frequency: 7.0 times per week     Types: Marijuana     Comment: Sleep aid only for arthritic purposes. Use for 2 months only    Sexual activity: Yes     Partners: Female     Birth control/protection: Post-menopausal     Comment: I have a vasectomy, she is post menopausal     Family History   Problem Relation Age of Onset    Arthritis Father     Cancer Father     Hearing loss Father     Hypertension Father     Cancer Maternal Grandmother     Cancer Paternal Grandfather     Arthritis Paternal Grandmother     Stroke Paternal Grandmother     Asthma Daughter     Hearing loss Maternal Uncle     Hypertension Brother     Hypertension Brother        Review of patient's allergies indicates:  No Known Allergies    Current Outpatient Medications   Medication Sig    allopurinoL (ZYLOPRIM) 100 MG tablet Take 200 mg by mouth.    celecoxib (CELEBREX) 200 MG capsule Take 1 capsule (200 mg total) by mouth once daily.    DULoxetine (CYMBALTA) 20 MG capsule Take 1 capsule (20 mg total) by mouth once daily.    DULoxetine (CYMBALTA) 60 MG capsule Take 1 capsule (60 mg total) by mouth once daily.    lisinopriL (PRINIVIL,ZESTRIL) 20 MG tablet Take 20 mg by mouth.    metFORMIN (GLUCOPHAGE-XR) 500 MG ER 24hr tablet Take 500 mg by mouth every morning.    multivit-min/ferrous fumarate (MULTI VITAMIN ORAL)     potassium citrate (UROCIT-K 15) 15 mEq TbSR Take 1 tablet by mouth once daily.    semaglutide (OZEMPIC SUBQ) Inject 0.75 mg into the skin once a week.     No current  "facility-administered medications for this visit.       ROS:  GENERAL: No fever. No chills. No fatigue. Denies weight loss. Denies weight gain.  HEENT: Denies headaches. Denies vision change. Denies eye pain. Denies double vision. Denies ear pain.   CV: Denies chest pain.   PULM: Denies of shortness of breath.  GI: Denies constipation. No diarrhea. No abdominal pain. Denies nausea. Denies vomiting. No blood in stool.  HEME: Denies bleeding problems.  : Denies urgency. No painful urination. No blood in urine.  MS: + Hip pain. +Shoulder pain.   SKIN: Denies rash.   NEURO: Denies seizures. No weakness.  PSYCH:  Denies difficulty sleeping. No anxiety. Denies depression. No suicidal thoughts.       VITALS:   Vitals:    11/01/23 1310   BP: 128/83   Pulse: 79   Resp: 18   SpO2: 100%   Weight: (!) 147.9 kg (326 lb 1 oz)   Height: 5' 9" (1.753 m)   PainSc:   8   PainLoc: Hip           PHYSICAL EXAM:   GENERAL: Well appearing, in no acute distress, alert and oriented x3.  PSYCH:  Mood and affect appropriate.  SKIN: Skin color, texture, turgor normal, no rashes or lesions.  HEENT:  Normocephalic, atraumatic. Cranial nerves grossly intact.  PULM: No evidence of respiratory difficulty, symmetric chest rise.  GI:  Non-distended  BACK: Normal range of motion. No pain to palpation over the spinous processes. No pain to palpation over facet joints. There is no pain with palpation over the sacroiliac joints bilaterally.   EXTREMITIES: No deformities, edema, or skin discoloration.   MUSCULOSKELETAL: Pain reproduced with left hip log roll. Pain with left GTB palpation as well.   NEURO: Sensation is equal and appropriate bilaterally. Bilateral upper and lower extremity strength is normal and symmetric. Bilateral upper and lower extremity coordination and muscle stretch reflexes are physiologic and symmetric. Plantar response are downgoing. Straight leg raising in the supine position is negative to radicular pain.   GAIT: Antalgic on " left leg. Uses cane to ambulate.       LABS:      IMAGING:        ASSESSMENT: 53 y.o. year old male with pain, consistent with:    Encounter Diagnosis   Name Primary?    Dorsalgia, unspecified Yes       DISCUSSION: Chris Macr Nava is a  for [employer] who comes to us with left hip pain which is worst with standing and walking. Imaging shows severe changes in his hip (on his phone). Exam shows pain reproduced with hip testing. He likes making chicken and white bean soup.     PLAN:  Continue Cymbalta  Continue Celebrex 200 QD - plan to discontinue if ineffective.   MRI Lumbar Spine for leg numbness  Continue weight loss   Filled out paperwork for temporary handicap tag  Discuss left hip steroid injection with orthopaedics.  Will schedule left hip steroid injection when cleared by orthopaedics  Follow up in 3 months      Allen Sylvester MD  [facility]  11/01/2023

## 2023-11-03 ENCOUNTER — CLINICAL SUPPORT (OUTPATIENT)
Dept: ENDOSCOPY | Facility: HOSPITAL | Age: 53
End: 2023-11-03
Attending: RADIOLOGY
Payer: COMMERCIAL

## 2023-11-03 ENCOUNTER — TELEPHONE (OUTPATIENT)
Dept: ENDOSCOPY | Facility: HOSPITAL | Age: 53
End: 2023-11-03

## 2023-11-03 ENCOUNTER — TELEPHONE (OUTPATIENT)
Dept: PAIN MEDICINE | Facility: CLINIC | Age: 53
End: 2023-11-03
Payer: COMMERCIAL

## 2023-11-03 VITALS — BODY MASS INDEX: 46.65 KG/M2 | WEIGHT: 315 LBS | HEIGHT: 69 IN

## 2023-11-03 DIAGNOSIS — Z12.11 ENCOUNTER FOR COLORECTAL CANCER SCREENING: ICD-10-CM

## 2023-11-03 DIAGNOSIS — Z12.12 ENCOUNTER FOR COLORECTAL CANCER SCREENING: ICD-10-CM

## 2023-11-03 NOTE — TELEPHONE ENCOUNTER
----- Message from Sherrill Finney sent at 11/3/2023  3:34 PM CDT -----  Contact: Jena  Type:  Patient Call          Who Called: [employer] - Jena         Does the patient know what this is regarding?: requesting a call back to have pt MRI orders faxed [facility] Fax [phone]   [phone] ; please advise           Best Call Back Number:[phone] option 2,3            Additional Information: Once pt is scheduled authorization will be submitted no need to submit foe a new authorization

## 2023-11-03 NOTE — TELEPHONE ENCOUNTER
Contacted the patient to schedule an endoscopy procedure(s) colonoscopy. Spoke with patient. Patient states he would like to schedule in March at [facility]. Patient informed March schedule is not yet available. Patient asked to be scheduled for a follow up PaT appointment. PAT appointment scheduled.

## 2023-11-03 NOTE — PLAN OF CARE
Contacted the patient to schedule an endoscopy procedure(s) colonoscopy. Spoke with patient. Patient states he would like to schedule in March at [facility]. Patient informed March schedule is not yet available. Patient asked to be scheduled for a follow up PaT appointment. PAT appointment scheduled.

## 2023-11-09 ENCOUNTER — TELEPHONE (OUTPATIENT)
Dept: PAIN MEDICINE | Facility: CLINIC | Age: 53
End: 2023-11-09
Payer: COMMERCIAL

## 2023-11-22 DIAGNOSIS — M25.552 LEFT HIP PAIN: Primary | ICD-10-CM

## 2023-11-27 ENCOUNTER — CLINICAL SUPPORT (OUTPATIENT)
Dept: ENDOSCOPY | Facility: HOSPITAL | Age: 53
End: 2023-11-27
Payer: COMMERCIAL

## 2023-11-27 DIAGNOSIS — Z12.11 ENCOUNTER FOR COLORECTAL CANCER SCREENING: ICD-10-CM

## 2023-11-27 DIAGNOSIS — Z12.11 SPECIAL SCREENING FOR MALIGNANT NEOPLASMS, COLON: Primary | ICD-10-CM

## 2023-11-27 DIAGNOSIS — Z12.12 ENCOUNTER FOR COLORECTAL CANCER SCREENING: ICD-10-CM

## 2023-11-27 NOTE — PLAN OF CARE
Pt. Having Hip replacement surgery after Jan 1. Pt. Instructed to call back following Post op Clearance. New refendo created and pt. Verbalized understanding to call back. Phone number provided.

## 2023-12-12 ENCOUNTER — OFFICE VISIT (OUTPATIENT)
Dept: ORTHOPEDICS | Facility: CLINIC | Age: 53
End: 2023-12-12
Payer: COMMERCIAL

## 2023-12-12 ENCOUNTER — TELEPHONE (OUTPATIENT)
Dept: ENDOSCOPY | Facility: HOSPITAL | Age: 53
End: 2023-12-12
Payer: COMMERCIAL

## 2023-12-12 ENCOUNTER — HOSPITAL ENCOUNTER (OUTPATIENT)
Dept: RADIOLOGY | Facility: HOSPITAL | Age: 53
Discharge: HOME | End: 2023-12-12
Attending: ORTHOPAEDIC SURGERY
Payer: COMMERCIAL

## 2023-12-12 VITALS — BODY MASS INDEX: 46.65 KG/M2 | HEIGHT: 69 IN | WEIGHT: 315 LBS

## 2023-12-12 DIAGNOSIS — M25.552 LEFT HIP PAIN: ICD-10-CM

## 2023-12-12 DIAGNOSIS — M16.12 PRIMARY OSTEOARTHRITIS OF LEFT HIP: Primary | ICD-10-CM

## 2023-12-12 PROCEDURE — 4010F ACE/ARB THERAPY RXD/TAKEN: CPT | Mod: CPTII,S$GLB,, | Performed by: ORTHOPAEDIC SURGERY

## 2023-12-12 PROCEDURE — 99999 PR PBB SHADOW E&M-EST. PATIENT-LVL III: CPT | Mod: PBBFAC,,, | Performed by: ORTHOPAEDIC SURGERY

## 2023-12-12 PROCEDURE — 99999 PR PBB SHADOW E&M-EST. PATIENT-LVL III: ICD-10-PCS | Mod: PBBFAC,,, | Performed by: ORTHOPAEDIC SURGERY

## 2023-12-12 PROCEDURE — 4010F PR ACE/ARB THEARPY RXD/TAKEN: ICD-10-PCS | Mod: CPTII,S$GLB,, | Performed by: ORTHOPAEDIC SURGERY

## 2023-12-12 PROCEDURE — 1159F MED LIST DOCD IN RCRD: CPT | Mod: CPTII,S$GLB,, | Performed by: ORTHOPAEDIC SURGERY

## 2023-12-12 PROCEDURE — 1159F PR MEDICATION LIST DOCUMENTED IN MEDICAL RECORD: ICD-10-PCS | Mod: CPTII,S$GLB,, | Performed by: ORTHOPAEDIC SURGERY

## 2023-12-12 PROCEDURE — 73502 XR HIP WITH PELVIS WHEN PERFORMED, 2 OR 3 VIEWS LEFT: ICD-10-PCS | Mod: 26,LT,, | Performed by: RADIOLOGY

## 2023-12-12 PROCEDURE — 73502 X-RAY EXAM HIP UNI 2-3 VIEWS: CPT | Mod: TC,LT

## 2023-12-12 PROCEDURE — 3008F BODY MASS INDEX DOCD: CPT | Mod: CPTII,S$GLB,, | Performed by: ORTHOPAEDIC SURGERY

## 2023-12-12 PROCEDURE — 99204 OFFICE O/P NEW MOD 45 MIN: CPT | Mod: S$GLB,,, | Performed by: ORTHOPAEDIC SURGERY

## 2023-12-12 PROCEDURE — 73502 X-RAY EXAM HIP UNI 2-3 VIEWS: CPT | Mod: 26,LT,, | Performed by: RADIOLOGY

## 2023-12-12 PROCEDURE — 99204 PR OFFICE/OUTPT VISIT, NEW, LEVL IV, 45-59 MIN: ICD-10-PCS | Mod: S$GLB,,, | Performed by: ORTHOPAEDIC SURGERY

## 2023-12-12 PROCEDURE — 3044F HG A1C LEVEL LT 7.0%: CPT | Mod: CPTII,S$GLB,, | Performed by: ORTHOPAEDIC SURGERY

## 2023-12-12 PROCEDURE — 3008F PR BODY MASS INDEX (BMI) DOCUMENTED: ICD-10-PCS | Mod: CPTII,S$GLB,, | Performed by: ORTHOPAEDIC SURGERY

## 2023-12-12 PROCEDURE — 3044F PR MOST RECENT HEMOGLOBIN A1C LEVEL <7.0%: ICD-10-PCS | Mod: CPTII,S$GLB,, | Performed by: ORTHOPAEDIC SURGERY

## 2023-12-12 NOTE — PROGRESS NOTES
Subjective:     HPI:   Chris Nava is a 53 y.o. male who presents for 2nd opinion L hip    Previously seen by Dr Duenas and told BMI needs to be 40 for surgery  Has also see pain clinic and had MRI spine ordered    He's had 5 years of progressive L hip pain. Saw PCP, told to lose weight and it was probably back and knee pain.     He complains of left-sided groin pain some posterior lateral hip pain in a C-sign distribution anterior thigh pain down to the knee some occasional radicular pain or paresthesias.      However he also says both his legs go to sleep and he has to sit and lean forward to wake them up      Medications: Celebrex (200mg, daily), Cymbalta (80mg, nightly),   MJ QHS, cymbalta, OTC arthritis cream    Injections: May 2023 left hip iaCSi in [place], significant relief for 8hrs    Physical Therapy: None. Not indicated for bone on bone arthritis    Assistive Devices: Yes, cane PRN, the patient stated that he uses his cane for long walks.     Walking: < 2 blocks    Limitations: difficulty walking, difficulty sleeping, difficulty with putting shoes and socks on.     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 The patient stated that their wife would be able to help take care of them if they were to have surgery.     The patient was referred from a former patient of John Madrid      ROS:  The updated medical history is in the chart and has been reviewed. A review of systems is updated and there is no reported vision changes, ear/nose/mouth/throat complaints,  chest pain, shortness of breath, abdominal pain, urological complaints, fevers or chills, psychiatric complaints. Musculoskeletal and neurologcial symptoms are as documented. All other systems are negative.      Objective:   Exam:  There were no vitals filed for this  visit.  Body mass index is 47.34 kg/m².    Physical examination included assessment of the patient's general appearance with particular attention to development, nutrition, body habitus, attention to grooming, and any evidence of distress.  Constitutional: The patient is a well-developed, well-nourished patient in no acute distress.   Cardiovascular: Vascular examination included warmth and capillary refill as well inspection for edema and assessment of pedal pulses. Pulses are palpable and regular.  Musculoskeletal: Gait was assessed as to whether it was steady, non-antalgic, and/or required the use of an assist device. The patient was also asked to walk independently and get onto the examination table.  Skin: The skin was examined for any obvious rashes or lesions in the extremity.  Neurologic: Sensation is intact to light touch in the extremity. The patient has good coordination without hyperreflexia and is alert and oriented to person, place and time and has normal mood and affect.     All of the above were examined and found to be within normal limits except for the following pertinent clinical findings:    Severe limp and antalgic gait positive Trendelenburg.  He is nontender over the greater trochanter he is groin pain with active straight leg raise difficulty with active hip flexion but 5/5 hip flexion strength is limited due to pain.  0-70 hip flexion 15 abduction 15 adduction 20 external-5 internal rotation which recreates his symptoms.  He is significant abdominal pannus that would overlap and entire anterior hip incision.  No significant limb length discrepancy supine he does not notice a difference      Imaging:    HIP L ARTHRITIS         Indication:  Left hip pain  Exam Ordered: Radiographs include an anteroposterior pelvis, an anteroposterior and lateral view of the proximal femur including the hip joint.  Details of Examination: Exam shows evidence of joint space narrowing, osteophyte formation, and  subchondral sclerosis, all consistent with degenerative arthritis of the hip.  No other significant findings are noted.  Impression:  Degenerative Arthritis, Left Hip    L hip Tg3 severe bone on bone hip arthritis  Evidence of underlying AVN with collapse    Impression:     Severe DJD left hip.     Sclerotic focus in right iliac wing of uncertain etiology.  Further evaluation recommended above.     This report was flagged in Epic as abnormal.    Upper portion of the right iliac wing has a sclerotic focus 2.5 cm across.  Imaging possibilities include benign bone island and a blastic lesion from neoplastic disease.  Comparison to any old exam that may be available outside would be helpful to evaluate for stability.  If none are available, further imaging evaluation is recommended such as bone scan or MRI.         Electronically signed by: Vu Bradley MD  Date:                                            12/12/2023  Time:                                           11:09      Assessment:       ICD-10-CM ICD-9-CM   1. Primary osteoarthritis of left hip  M16.12 715.15      BMI 47.3 - lost 120lbs this year, started ozempic in September    L hip OA likely underlying AVN  No sig risk factors: no high dose steroid exposure, no tob, no heavy/daily etoh    Concern for spinal stenosis - both legs go to sleep and he has to sit and lean forward to get them to wake up  MRI L spine ordered by pain clinic but not done, patient cancelled as he was stuck in mobile for work     Plan:       The above findings were discussed with patient length. We discussed the risks of conservative versus surgical management of the patients hip arthritis. Conservative management consisting of anti-inflammatory medications, weight loss, physical therapy, and activity modification was discussed at length. At this point considering the patient's level of activity, pain, and radiographic findings I recommend KAYLA when medically optimized    KAYLA  info  Goal BMI <45, <300lbs = 20lb wt loss  Given abd pannus will need to be post approach  Would be a good CHARLI candidate    Concern for spinal stenosis, legs going to sleep, needs to complete MRI L spine pre-op and f/u with Dr Ibarra in pain clinic ?RAMA     Update:   L hip XR radiology read:   FINDINGS:  Right hip is intact with satisfactory joint space.  Left hip shows severe degenerative joint disease with obliteration of joint space in weight-bearing area, bone-on-bone contact, flattening of the femoral head contour, marginal spurring, and subcortical cystic change.  Underlying AVN of the femoral head is possible.     The SI joints are intact.  Degenerative changes are noted at lower lumbar disc spaces.     Upper portion of the right iliac wing has a sclerotic focus 2.5 cm across.  Imaging possibilities include benign bone island and a blastic lesion from neoplastic disease.  Comparison to any old exam that may be available outside would be helpful to evaluate for stability.  If none are available, further imaging evaluation is recommended such as bone scan or MRI.     Impression:     Severe DJD left hip.     Sclerotic focus in right iliac wing of uncertain etiology.  Further evaluation recommended above.     This report was flagged in Epic as abnormal.        Electronically signed by: Vu Bradley MD  Date:                                            12/12/2023  Time:                                           11:09    Obtained hip XR from 8/23 outside ortho appt  Unable to obtain previous imaging from [facility]    Have discussed with Dr Ramirez - ordered MRI w/ and w/out contrast of pelvis at [facility] with f/u with Dr Ramirez after that, hopefully he can have his L spine imaged at that time as well as he did not fit into the scanner at [facility]    Called and discussed plan with patient, discussed that open MRI image quality is poor/not ideal, rec trying [facility] first        No orders of the defined types were placed  in this encounter.            Past Medical History:   Diagnosis Date    Arthritis     Kidney stones        Past Surgical History:   Procedure Laterality Date    CYSTOSCOPY W/ LASER LITHOTRIPSY      VASECTOMY         Family History   Problem Relation Age of Onset    Arthritis Father     Cancer Father     Hearing loss Father     Hypertension Father     Cancer Maternal Grandmother     Cancer Paternal Grandfather     Arthritis Paternal Grandmother     Stroke Paternal Grandmother     Asthma Daughter     Hearing loss Maternal Uncle     Hypertension Brother     Hypertension Brother        Social History     Socioeconomic History    Marital status:    Tobacco Use    Smoking status: Never     Passive exposure: Never    Smokeless tobacco: Never   Substance and Sexual Activity    Alcohol use: Yes     Comment: Maybe wine at dinner 1x per week, maybe bourbon 1 night p/wk    Drug use: Yes     Frequency: 7.0 times per week     Types: Marijuana     Comment: Sleep aid only for arthritic purposes. Use for 2 months only    Sexual activity: Yes     Partners: Female     Birth control/protection: Post-menopausal     Comment: I have a vasectomy, she is post menopausal

## 2023-12-12 NOTE — TELEPHONE ENCOUNTER
Patient: Oswaldo Akers Date: 2/22/2022   YOB: 1932 Attending: Bari Lr MD   89 year old male Hospital Day: 45       Nephrology Hospital Progress Note      Subjective: no acute events overnight. Sleeping but easily arousable. Denies chest pain, shortness of breath, or dysuria. Daughter at bedside reports good oral intake today    Objective     Past Medical, Surgical, Family, Social Histories Reviewed  Allergies Reviewed     PHYSICAL EXAM    Patient Vitals for the past 24 hrs:   Visit Vitals  BP (!) 149/69 (BP Location: LUE - Left upper extremity)   Pulse 91   Temp 98 °F (36.7 °C) (Oral)   Resp 18   Ht 6' 2\" (1.88 m)   Wt 58.7 kg (129 lb 8 oz)   SpO2 99%   BMI 16.63 kg/m²     Intake/Output Summary:     Intake/Output Summary (Last 24 hours) at 2/22/2022 2255  Last data filed at 2/22/2022 1825  Gross per 24 hour   Intake 900 ml   Output 1 ml   Net 899 ml       General:  Sleeping but easily arousable, frail, in no acute distress  Head: Normocephalic, without obvious abnormality, atraumatic  ENT: Nares normal, intact oral and nasal mucus membranes.  CVS: S1S2+, No rub, no murmur  Chest/Lungs: Respiratory effort normal, non labored breathing, no crackles or wheezes, diminished breath sounds bilaterally  Abdomen: Soft, non distended, non tender, + bs  Neuro: Moving 4 extremities spontaneously, no focal deficits  Psychiatric: Appropriate mood and affect.   Skin: Warm, dry, intact.  No rashes.   Extremities:  Atraumatic, 1+ BLE ankle edema  Pulses: Palpable radial pulses    DATA:    Current Medications:  Scheduled:   • enoxaparin  40 mg Subcutaneous Nightly   • mirtazapine  15 mg Oral Nightly   • amLODIPine  10 mg Oral Daily   • metoPROLOL tartrate  25 mg Oral 2 times per day   • vitamin D3  1.25 mg Oral Once per day on Tue   • folic acid  1 mg Oral Daily   • sodium chloride (PF)  2 mL Intracatheter 2 times per day   • Potassium Standard Replacement Protocol   Does not apply See Admin Instructions  Pt has been contacted twice to schedule colonoscopy. Pt having surgery in January.new pat appt for colonoscopy scheduling in april     • Magnesium Standard Replacement Protocol   Does not apply See Admin Instructions   • Phosphorus Standard Replacement Protocol   Does not apply See Admin Instructions        Continuous Infusions:   • sodium chloride 0.9% infusion     • sodium chloride 0.9% infusion     • sodium chloride 0.9% infusion         Laboratory:  Recent Labs   Lab 02/19/22  0558 02/18/22  0719 02/18/22  0627 02/18/22  0535 02/17/22  0449 02/17/22  0448 02/16/22  0459   SODIUM  --   --  145  --   --  144 143   POTASSIUM  --   --  4.4  --  3.8 3.9 3.9  4.0   CHLORIDE  --   --  107  --   --  105 105   CO2  --   --  30  --   --  29 27   BUN  --   --  11  --   --  11 10   CREATININE  --   --  1.10  --   --  1.19* 1.17   GLUCOSE  --   --  81  --   --  89 85   WBC 4.4  --   --  5.0  --   --   --    HGB 8.0* 7.9*  --  6.9*  --   --   --    HCT 24.9* 25.5*  --  21.7*  --   --   --      --   --  194  --   --   --        Radiology:     Renal US 1/9/2022  IMPRESSION:   Increased echogenicity of the renal parenchyma bilaterally consistent with  underlying medical renal disease.  Possible small nonobstructing right renal stone.  Large left renal cyst. Given the size, annual sonographic follow-up is  recommended.  Cholelithiasis.    Assessment/Plan:    Acute kidney injury   Initial GISELL related to pre-renal azotemia secondary to poor oral intake in the setting of diuretic use prior to admission   Recurrent GISELL throughout his admission related to pre-renal azotemia secondary to poor oral intake   New baseline creatinine is likely 1.32-1.4 mg/dL Only time will tell.  Kidney function improving with creatinine 1.10 <-- 1.19 <---1.14<--1.46<--1.79<--1.23<--1.78<--1.48<-- 1.35<<<<<--6.21 (admission 1/9)  OFF IVF  Continue to encourage PO intake  Continue to monitor kidney function closely  Renal US showed possible small non obstructing right renal stone, large left renal cyst  Check daily BMP, daily weight, intact and output  Renally dose all  medications, avoid IV contrast, avoid nephrotoxins     Nonnephrotic range proteinuria   UPCR 1835  Unable to start Ace inhibitor or Arb due to acute kidney injury    Metabolic Alkalosis secondary to contraction alkalosis:  Resolved    Hypernatremia:  Resolved    Hyperkalemia:  Resolved     Hypoalbuminemia with albumin 3.3    Anemia   H and H are low but improved 8.1 and 25.7<--6.9 and 21.8 (2/5)<--7.4 and 30  S/p 1 unit PRBC 2/5  Iron low 36, ferritin high 762, percent iron saturation 24 on admission  Repeat iron is low 37<--21, ferritin remains elevated 617<--596<--830  Continue IV Venofer 200 mg IV daily x3 days and B12 injections 3x per week    Vitamin-D deficiency with vitamin-D level of 10.6  Continue ergocalciferol 73171 units weekly for 10 weeks then monthly  Phosphorus level normal 4.3    Hypertension  Blood pressure 130s today  Continue amlodipine 10 mg daily and metoprolol 25 mg b.i.d    Hyperuricemia  Uric acid level improved 3.7<-- 19.4  OFF Uloric 40 mg daily    Delirium/Altered mental status s/t dehydration vs subacute subdural hematoma: resolved  Management as per primary team    Suspected aspiration pneumonia  Complicated UTI  Antibiotics as per primary team.      Meg Westfall MD  2/22/2022

## 2023-12-13 ENCOUNTER — PATIENT MESSAGE (OUTPATIENT)
Dept: ORTHOPEDICS | Facility: CLINIC | Age: 53
End: 2023-12-13
Payer: COMMERCIAL

## 2023-12-13 ENCOUNTER — HOSPITAL ENCOUNTER (OUTPATIENT)
Dept: RADIOLOGY | Facility: OTHER | Age: 53
Discharge: HOME | End: 2023-12-13
Payer: COMMERCIAL

## 2023-12-13 DIAGNOSIS — M54.9 DORSALGIA, UNSPECIFIED: ICD-10-CM

## 2023-12-19 ENCOUNTER — TELEPHONE (OUTPATIENT)
Dept: ORTHOPEDICS | Facility: CLINIC | Age: 53
End: 2023-12-19
Payer: COMMERCIAL

## 2023-12-19 DIAGNOSIS — M16.12 PRIMARY OSTEOARTHRITIS OF LEFT HIP: ICD-10-CM

## 2023-12-19 NOTE — TELEPHONE ENCOUNTER
I called the patient this morning regarding the message below. The patient did not answer. I left a voicemail with a call back number.    ----- Message from Saurabh Ramírez sent at 12/19/2023  7:17 AM CST -----  Regarding: FW: old xrays  Hey,    I am out today. Can you see if be can drop this off to the ortho clinic?      I originally told him she could take it to [place] but I wont be there today and dont want it to get lost     Ajith  ----- Message -----  From: Saurabh Ramírez  Sent: 12/19/2023   7:00 AM CST  To: Saurabh Ramírez  Subject: FW: old xrays                                    Call to check about disc       ----- Message -----  From: Saurabh Ramírez  Sent: 12/14/2023   7:00 AM CST  To: Saurabh Ramírez  Subject: FW: old xrays                                      ----- Message -----  From: Saurabh Ramírez  Sent: 12/13/2023   6:52 AM CST  To: Saurabh Ramírez  Subject: FW: old xrays                                    12/13 messaged cain      ----- Message -----  From: Scot Barrios III, MD  Sent: 12/12/2023   5:33 PM CST  To: Saurabh Ramírez  Subject: old xrays                                        Can you see if he can get any of his xrays from dr bermudez office or from [facility] for us to review  Radiology is calling a bone island in his right iliac wing that needs further eval

## 2023-12-19 NOTE — TELEPHONE ENCOUNTER
I called the patient regarding the message below. The patient did not answer. I left a voicemail with a call back number.     ----- Message from Kathy Hansen sent at 12/19/2023  8:57 AM CST -----  Regarding: Enroute to  disc  Contact: pt @[phone]  Pt is enroute to loc to  disc and would like a call bk to know where should he drop off the disc. Please c/b to advise.. Thanks

## 2023-12-20 ENCOUNTER — TELEPHONE (OUTPATIENT)
Dept: ORTHOPEDICS | Facility: CLINIC | Age: 53
End: 2023-12-20
Payer: COMMERCIAL

## 2023-12-20 ENCOUNTER — PATIENT MESSAGE (OUTPATIENT)
Dept: ORTHOPEDICS | Facility: CLINIC | Age: 53
End: 2023-12-20
Payer: COMMERCIAL

## 2023-12-20 RX ORDER — CELECOXIB 200 MG/1
200 CAPSULE ORAL DAILY
Qty: 30 CAPSULE | Refills: 1 | Status: ON HOLD | OUTPATIENT
Start: 2023-12-20 | End: 2024-01-21

## 2023-12-20 NOTE — TELEPHONE ENCOUNTER
Patient had imaging done on 12/12/23.  Looking for prior imaging for comparison. Dropped off disc earlier and uploaded those - unfortunately, there's not a comparable view of the area on that imaging.  He did have XR in [place] in April.  He has sent me photos of those which unfortunately are not diagnostic quality nor do they include a good view of area. Will f/u with MD on next steps and updated patient as I have  updates.

## 2023-12-29 ENCOUNTER — TELEPHONE (OUTPATIENT)
Dept: ORTHOPEDICS | Facility: HOSPITAL | Age: 53
End: 2023-12-29
Payer: COMMERCIAL

## 2023-12-29 ENCOUNTER — TELEPHONE (OUTPATIENT)
Dept: ORTHOPEDICS | Facility: CLINIC | Age: 53
End: 2023-12-29
Payer: COMMERCIAL

## 2023-12-29 DIAGNOSIS — M89.9 BONE LESION: Primary | ICD-10-CM

## 2023-12-29 NOTE — TELEPHONE ENCOUNTER
I called the patient this morning to let him know that he has been [facility]. The patient did not answer. I left him a voicemail with the time/date/location of his MRI. I will try calling again to confirm his appointment.    ----- Message from Scot Barrios III, MD sent at 12/29/2023  7:38 AM CST -----  Regarding: RE: bone lesion R iliac wing  Thank you  Will arrange MRI and f/u with you    Lashawn, can you work on that today?       ----- Message -----  From: Esther Ramirez MD  Sent: 12/29/2023   7:26 AM CST  To: Scot Barrios III, MD; Saurabh Ramírez; #  Subject: RE: bone lesion R iliac wing                     Hard to say what it is. Would recommend getting MRI pelvis with and without contrast to better characterize it. I can see after MRI and review MRI at my tumor board.    Minty  ----- Message -----  From: Scot Barrios III, MD  Sent: 12/28/2023   9:04 AM CST  To: Saurabh Ramírez; Esther Ramirez MD  Subject: bone lesion R iliac wing                         Martinez,   Would appreciate your input when you get a chance  This cristobal came for L hip AVN, likely related to a hx of heavy drinking in the past, working on getting his BMI down below 45 for surgery     However, radiology noted an area of sclerosis in the right iliac wing, no known cancer history, I got an outside xray from [place] where you get a view of it.   All his pain is centered around the left hip and he's got a huge pannus so would be hard to palpate the right iliac wing even if you wanted to    He moved back to [facility] from [place] may 2023 so dont have any older xrays    If you were to image this what would you recommend?     Thank you,  Will

## 2023-12-29 NOTE — TELEPHONE ENCOUNTER
MRI ordered  Will call patient and explain   plan f/u with Dr Ramirez after MRI    ----- Message from Esther Ramirez MD sent at 12/29/2023  7:25 AM CST -----  Regarding: RE: bone lesion R iliac wing  Hard to say what it is. Would recommend getting MRI pelvis with and without contrast to better characterize it. I can see after MRI and review MRI at my tumor board.    Minty  ----- Message -----  From: Scot Barrios III, MD  Sent: 12/28/2023   9:04 AM CST  To: Saurabh Ramírez; Esther Ramirez MD  Subject: bone lesion R iliac wing                         Martinez,   Would appreciate your input when you get a chance  This cristobal came for L hip AVN, likely related to a hx of heavy drinking in the past, working on getting his BMI down below 45 for surgery     However, radiology noted an area of sclerosis in the right iliac wing, no known cancer history, I got an outside xray from [place] where you get a view of it.   All his pain is centered around the left hip and he's got a huge pannus so would be hard to palpate the right iliac wing even if you wanted to    He moved back to [facility] from [place] may 2023 so dont have any older xrays    If you were to image this what would you recommend?     Thank you,  Will

## 2024-01-02 ENCOUNTER — PATIENT MESSAGE (OUTPATIENT)
Dept: ORTHOPEDICS | Facility: CLINIC | Age: 54
End: 2024-01-02
Payer: COMMERCIAL

## 2024-01-17 ENCOUNTER — HOSPITAL ENCOUNTER (INPATIENT)
Facility: HOSPITAL | Age: 54
LOS: 3 days | Discharge: HOME | DRG: 603 | End: 2024-01-21
Attending: EMERGENCY MEDICINE | Admitting: ORTHOPAEDIC SURGERY
Payer: COMMERCIAL

## 2024-01-17 DIAGNOSIS — M16.12 PRIMARY OSTEOARTHRITIS OF LEFT HIP: ICD-10-CM

## 2024-01-17 DIAGNOSIS — L03.011 CELLULITIS OF FINGER OF RIGHT HAND: Primary | ICD-10-CM

## 2024-01-17 LAB
ALBUMIN SERPL BCP-MCNC: 4.1 G/DL (ref 3.5–5.2)
ALP SERPL-CCNC: 95 U/L (ref 55–135)
ALT SERPL W/O P-5'-P-CCNC: 34 U/L (ref 10–44)
ANION GAP SERPL CALC-SCNC: 9 MMOL/L (ref 8–16)
AST SERPL-CCNC: 25 U/L (ref 10–40)
BASOPHILS # BLD AUTO: 0.05 K/UL (ref 0–0.2)
BASOPHILS NFR BLD: 0.4 % (ref 0–1.9)
BILIRUB SERPL-MCNC: 1.1 MG/DL (ref 0.1–1)
BUN SERPL-MCNC: 12 MG/DL (ref 6–20)
CALCIUM SERPL-MCNC: 10 MG/DL (ref 8.7–10.5)
CHLORIDE SERPL-SCNC: 101 MMOL/L (ref 95–110)
CO2 SERPL-SCNC: 27 MMOL/L (ref 23–29)
CREAT SERPL-MCNC: 0.8 MG/DL (ref 0.5–1.4)
CRP SERPL-MCNC: 35.2 MG/L (ref 0–8.2)
DIFFERENTIAL METHOD BLD: ABNORMAL
EOSINOPHIL # BLD AUTO: 0.2 K/UL (ref 0–0.5)
EOSINOPHIL NFR BLD: 1.6 % (ref 0–8)
ERYTHROCYTE [DISTWIDTH] IN BLOOD BY AUTOMATED COUNT: 14.2 % (ref 11.5–14.5)
ERYTHROCYTE [SEDIMENTATION RATE] IN BLOOD BY PHOTOMETRIC METHOD: 4 MM/HR (ref 0–23)
EST. GFR  (NO RACE VARIABLE): >60 ML/MIN/1.73 M^2
GLUCOSE SERPL-MCNC: 89 MG/DL (ref 70–110)
GRAM STN SPEC: NORMAL
GRAM STN SPEC: NORMAL
HCT VFR BLD AUTO: 54.4 % (ref 40–54)
HGB BLD-MCNC: 17.5 G/DL (ref 14–18)
HIV 1+2 AB+HIV1 P24 AG SERPL QL IA: NORMAL
IMM GRANULOCYTES # BLD AUTO: 0.05 K/UL (ref 0–0.04)
IMM GRANULOCYTES NFR BLD AUTO: 0.4 % (ref 0–0.5)
LYMPHOCYTES # BLD AUTO: 1.9 K/UL (ref 1–4.8)
LYMPHOCYTES NFR BLD: 13.1 % (ref 18–48)
MCH RBC QN AUTO: 27.6 PG (ref 27–31)
MCHC RBC AUTO-ENTMCNC: 32.2 G/DL (ref 32–36)
MCV RBC AUTO: 86 FL (ref 82–98)
MONOCYTES # BLD AUTO: 1.4 K/UL (ref 0.3–1)
MONOCYTES NFR BLD: 9.5 % (ref 4–15)
NEUTROPHILS # BLD AUTO: 10.7 K/UL (ref 1.8–7.7)
NEUTROPHILS NFR BLD: 75 % (ref 38–73)
NRBC BLD-RTO: 0 /100 WBC
PLATELET # BLD AUTO: 245 K/UL (ref 150–450)
PMV BLD AUTO: 9.6 FL (ref 9.2–12.9)
POTASSIUM SERPL-SCNC: 4.3 MMOL/L (ref 3.5–5.1)
PROT SERPL-MCNC: 7.8 G/DL (ref 6–8.4)
RBC # BLD AUTO: 6.34 M/UL (ref 4.6–6.2)
SODIUM SERPL-SCNC: 137 MMOL/L (ref 136–145)
WBC # BLD AUTO: 14.24 K/UL (ref 3.9–12.7)

## 2024-01-17 PROCEDURE — 99285 EMERGENCY DEPT VISIT HI MDM: CPT | Mod: 25

## 2024-01-17 PROCEDURE — 87102 FUNGUS ISOLATION CULTURE: CPT

## 2024-01-17 PROCEDURE — 85025 COMPLETE CBC W/AUTO DIFF WBC: CPT | Performed by: EMERGENCY MEDICINE

## 2024-01-17 PROCEDURE — 87070 CULTURE OTHR SPECIMN AEROBIC: CPT

## 2024-01-17 PROCEDURE — 0H9FXZZ DRAINAGE OF RIGHT HAND SKIN, EXTERNAL APPROACH: ICD-10-PCS | Performed by: ORTHOPAEDIC SURGERY

## 2024-01-17 PROCEDURE — 87075 CULTR BACTERIA EXCEPT BLOOD: CPT

## 2024-01-17 PROCEDURE — 80053 COMPREHEN METABOLIC PANEL: CPT | Performed by: EMERGENCY MEDICINE

## 2024-01-17 PROCEDURE — 25000003 PHARM REV CODE 250

## 2024-01-17 PROCEDURE — 87205 SMEAR GRAM STAIN: CPT

## 2024-01-17 PROCEDURE — 25000003 PHARM REV CODE 250: Performed by: EMERGENCY MEDICINE

## 2024-01-17 PROCEDURE — 96366 THER/PROPH/DIAG IV INF ADDON: CPT

## 2024-01-17 PROCEDURE — 87206 SMEAR FLUORESCENT/ACID STAI: CPT

## 2024-01-17 PROCEDURE — 87077 CULTURE AEROBIC IDENTIFY: CPT

## 2024-01-17 PROCEDURE — 87186 SC STD MICRODIL/AGAR DIL: CPT

## 2024-01-17 PROCEDURE — 96367 TX/PROPH/DG ADDL SEQ IV INF: CPT

## 2024-01-17 PROCEDURE — 86140 C-REACTIVE PROTEIN: CPT | Performed by: EMERGENCY MEDICINE

## 2024-01-17 PROCEDURE — 87040 BLOOD CULTURE FOR BACTERIA: CPT | Mod: 59 | Performed by: EMERGENCY MEDICINE

## 2024-01-17 PROCEDURE — 63600175 PHARM REV CODE 636 W HCPCS: Performed by: EMERGENCY MEDICINE

## 2024-01-17 PROCEDURE — 12000002 HC ACUTE/MED SURGE SEMI-PRIVATE ROOM

## 2024-01-17 PROCEDURE — 85652 RBC SED RATE AUTOMATED: CPT | Performed by: EMERGENCY MEDICINE

## 2024-01-17 PROCEDURE — 87389 HIV-1 AG W/HIV-1&-2 AB AG IA: CPT | Performed by: PHYSICIAN ASSISTANT

## 2024-01-17 PROCEDURE — 96365 THER/PROPH/DIAG IV INF INIT: CPT

## 2024-01-17 PROCEDURE — 87116 MYCOBACTERIA CULTURE: CPT

## 2024-01-17 RX ORDER — METRONIDAZOLE 500 MG/1
500 TABLET ORAL EVERY 8 HOURS
Status: DISCONTINUED | OUTPATIENT
Start: 2024-01-18 | End: 2024-01-19

## 2024-01-17 RX ORDER — ACETAMINOPHEN 325 MG/1
650 TABLET ORAL
Status: DISCONTINUED | OUTPATIENT
Start: 2024-01-17 | End: 2024-01-21 | Stop reason: HOSPADM

## 2024-01-17 RX ORDER — METRONIDAZOLE 500 MG/100ML
500 INJECTION, SOLUTION INTRAVENOUS
Status: COMPLETED | OUTPATIENT
Start: 2024-01-17 | End: 2024-01-17

## 2024-01-17 RX ORDER — CELECOXIB 100 MG/1
200 CAPSULE ORAL DAILY
Status: DISCONTINUED | OUTPATIENT
Start: 2024-01-18 | End: 2024-01-21 | Stop reason: HOSPADM

## 2024-01-17 RX ORDER — OXYCODONE HYDROCHLORIDE 5 MG/1
5 TABLET ORAL EVERY 4 HOURS PRN
Status: DISCONTINUED | OUTPATIENT
Start: 2024-01-17 | End: 2024-01-21 | Stop reason: HOSPADM

## 2024-01-17 RX ADMIN — METRONIDAZOLE 500 MG: 500 INJECTION, SOLUTION INTRAVENOUS at 03:01

## 2024-01-17 RX ADMIN — VANCOMYCIN HYDROCHLORIDE 2500 MG: 1 INJECTION, POWDER, LYOPHILIZED, FOR SOLUTION INTRAVENOUS at 02:01

## 2024-01-17 RX ADMIN — OXYCODONE HYDROCHLORIDE 5 MG: 5 TABLET ORAL at 10:01

## 2024-01-17 RX ADMIN — OXYCODONE HYDROCHLORIDE 5 MG: 5 TABLET ORAL at 04:01

## 2024-01-17 RX ADMIN — ACETAMINOPHEN 650 MG: 325 TABLET ORAL at 10:01

## 2024-01-17 RX ADMIN — CEFEPIME 2 G: 2 INJECTION, POWDER, FOR SOLUTION INTRAVENOUS at 04:01

## 2024-01-17 RX ADMIN — ACETAMINOPHEN 650 MG: 325 TABLET ORAL at 04:01

## 2024-01-17 NOTE — Clinical Note
Diagnosis: Cellulitis of finger of right hand [881489]   Future Attending Provider: AVI LLAMAS [[id number]]   Reason for IP Medical Treatment  (Clinical interventions that can only be accomplished in the IP setting? ) :: IV abx   I certify that Inpatient services for greater than or equal to 2 midnights are medically necessary:: Yes   Plans for Post-Acute care--if anticipated (pick the single best option):: A. No post acute care anticipated at this time

## 2024-01-17 NOTE — ED TRIAGE NOTES
Pt arrives via personal vehicle with redness and swelling to right ring finger up to the knuckle, pulled out what appeared to be a ingrown hair. This occurred 2 nights ago on Monday.

## 2024-01-17 NOTE — ASSESSMENT & PLAN NOTE
Chris Nava is a 53 y.o. male who presents with 2 days of worsening right ring finger swelling and pain.  Patient presented afebrile with labs showing WBC 14.24, ESR 4, CRP 35.2.  On exam, patient had a erythematous fluctuance at the dorsum of the right ring finger P2 with proximal extension to the dorsum of fourth mcp joint.  0/4 kanavel signs, and patient had no other musculoskeletal pains on physical exam.  Bedside I&D was performed, and cultures were obtained.  Plan for admission for 24hrs IV abx for right hand cellulitis, multimodal pain control, and re-evaluation in the morning.      Lab Results   Component Value Date    SEDRATE 4 2024    CRP 35.2 (H) 2024    WBC 14.24 (H) 2024       Procedure Note: superficial right ring finger incision and drainage   Risks, benefits, and alternatives to treatment was explained to patient at length. Patient verbalized their understanding and gave consent to proceed. Time out was performed and patient name, , site, and procedure were confirmed. 4 cc of 1% lidocaine was used for local block. Skin was sterilely prepared with betadine. Sterile field was prepped around the surgical site. Skin incision was made over ulnar aspect of the dorsum of right ring finger with scalpel. Incision was taken down bluntly with a hemostat to fluid collection. Serous fluid was expressed intermixed with blood. Fluid sample was obtained and sent for analysis and cultures. Incision was irrigated with 1 L of saline. Soft compressive ace wrap dressing was applied.  Patient encouraged to keep extremity elevated. Patient tolerated the procedure well. Blood loss was minimal.

## 2024-01-17 NOTE — HPI
Chris Nava is a right hand dominant 53 y.o. male w/ no significant PMH, who presents with right ring finger redness and swelling.  Reports on Monday he noticed a small red bump at his right ring finger that became progressively more painful and swollen.  States pain is at the dorsum of his right ring finger and hand but denies any pain at his palm or any other areas of pain at the right upper extremity.  Denies any fevers, chills, and he reports he has not had any recent infections.  Patient is employed as a , ambulates with cane assistance at baseline.  Lives at home by himself, does not smoke, and is not on any blood thinners.

## 2024-01-17 NOTE — ED NOTES
Patient identifiers verified and correct for Chris Nava    LOC: The patient is awake, alert and aware of environment with an appropriate affect, the patient is oriented x 3 and speaking appropriately.   APPEARANCE: Patient appears comfortable and in no acute distress  SKIN: The skin is warm and dry, color consistent with ethnicity, patient has normal skin turgor and moist mucus membranes, skin intact, no breakdown or bruising noted.   MUSCULOSKELETAL: Patient moving all extremities spontaneously. Redness and swelling noted to Right Ring finger from just below nail to lowest knuckle  RESPIRATORY: Airway is open and patent, respirations are spontaneous, patient has a normal effort and rate, no accessory muscle use noted, sats noted at 98% on room air.  CARDIAC: Patient has a normal rate no edema noted, capillary refill < 3 seconds.   GASTRO: Soft and non tender to palpation, no distention noted, normoactive bowel sounds present in all four quadrants. Pt states bowel movements have been regular.  : Pt denies any pain or frequency with urination.  NEURO: Pt opens eyes spontaneously, behavior appropriate to situation, follows commands, facial expression symmetrical, bilateral hand grasp equal and even, purposeful motor response noted, normal sensation in all extremities when touched with a finger.

## 2024-01-17 NOTE — SUBJECTIVE & OBJECTIVE
Past Medical History:   Diagnosis Date    Arthritis     Kidney stones        Past Surgical History:   Procedure Laterality Date    CYSTOSCOPY W/ LASER LITHOTRIPSY      VASECTOMY         Review of patient's allergies indicates:  No Known Allergies    Current Facility-Administered Medications   Medication    ceFEPIme (MAXIPIME) 2 g in dextrose 5 % in water (D5W) 100 mL IVPB (MB+)    metronidazole IVPB 500 mg    vancomycin (VANCOCIN) 2,500 mg in dextrose 5 % (D5W) 500 mL IVPB     Current Outpatient Medications   Medication Sig    allopurinoL (ZYLOPRIM) 100 MG tablet Take 200 mg by mouth.    celecoxib (CELEBREX) 200 MG capsule Take 1 capsule (200 mg total) by mouth once daily.    DULoxetine (CYMBALTA) 20 MG capsule Take 1 capsule (20 mg total) by mouth once daily.    DULoxetine (CYMBALTA) 60 MG capsule Take 1 capsule (60 mg total) by mouth once daily.    lisinopriL (PRINIVIL,ZESTRIL) 20 MG tablet Take 20 mg by mouth.    metFORMIN (GLUCOPHAGE-XR) 500 MG ER 24hr tablet Take 500 mg by mouth every morning.    multivit-min/ferrous fumarate (MULTI VITAMIN ORAL)     potassium citrate (UROCIT-K 15) 15 mEq TbSR Take 1 tablet by mouth once daily.    semaglutide (OZEMPIC SUBQ) Inject 0.75 mg into the skin once a week.     Family History       Problem Relation (Age of Onset)    Arthritis Father, Paternal Grandmother    Asthma Daughter    Cancer Father, Maternal Grandmother, Paternal Grandfather    Hearing loss Father, Maternal Uncle    Hypertension Father, Brother, Brother    Stroke Paternal Grandmother          Tobacco Use    Smoking status: Never     Passive exposure: Never    Smokeless tobacco: Never   Substance and Sexual Activity    Alcohol use: Yes     Comment: Maybe wine at dinner 1x per week, maybe bourbon 1 night p/wk    Drug use: Yes     Frequency: 7.0 times per week     Types: Marijuana     Comment: Sleep aid only for arthritic purposes. Use for 2 months only    Sexual activity: Yes     Partners: Female     Birth  "control/protection: Post-menopausal     Comment: I have a vasectomy, she is post menopausal     ROS  Constitutional: Denies fever/chills  Eyes: Denies change in vision  ENT: Denies sore throat or rhinorrhea   Respiratory: Denies shortness of breath or cough  Cardiovascular: Denies chest pain or palpitations  Gastrointestinal: Denies abdominal pain, nausea, or vomiting  Genitourinary: Denies dysuria and flank pain  Skin: Denies new rash or skin lesions   Allergic/Immunologic: Denies adverse reactions to current medications  Neurological: Denies headaches or dizziness  Musculoskeletal: see HPI    Objective:     Vital Signs (Most Recent):  Temp: 98.2 °F (36.8 °C) (01/17/24 1250)  Pulse: 99 (01/17/24 1250)  Resp: 20 (01/17/24 1250)  BP: (!) 164/92 (01/17/24 1250)  SpO2: 98 % (01/17/24 1250) Vital Signs (24h Range):  Temp:  [98.2 °F (36.8 °C)] 98.2 °F (36.8 °C)  Pulse:  [99] 99  Resp:  [20] 20  SpO2:  [98 %] 98 %  BP: (164)/(92) 164/92     Weight: 135.6 kg (299 lb)  Height: 5' 9" (175.3 cm)  Body mass index is 44.15 kg/m².    No intake or output data in the 24 hours ending 01/17/24 1456     Ortho/SPM Exam  Physical Exam:  General:  no apparent distress, WDWN  HENT:  NCAT, Bilateral ears/eyes normal  CV:  Normal pulses, color, and cap refill  Lungs:  Normal respiratory effort  Neuro: No FND, awake, alert  Psych:  Oriented to Person, Place, Time, and Situation    MSK:       LUE:  Inspection: Skin intact throughout, no swelling, no effusions, no ecchymosis   Palpation: Non-TTP throughout, no palpable abnormality.   ROM: AROM and PROM of the shoulder, elbow, wrist, and hand intact without pain.   Neuro: AIN/PIN/Radial/Median/Ulnar Nerves assessed in isolation without deficit.   SILT throughout.    Vascular: Radial artery palpated 2+. Capillary refill <3s.         LLE:  Inspection: Skin intact throughout, no swelling, no effusions, no ecchymosis   Palpation: Non-TTP throughout. No palpable abnormality.   ROM: AROM and PROM of " "the hip, knee, ankle, and foot intact without pain.   Neuro: TA/EHL/Gastroc/FHL assessed in isolation without deficit.   SILT throughout.    Vascular: Foot is WWP. Capillary refill <3s.         RLE:  Inspection: Skin intact throughout, no swelling, no effusions, no ecchymosis   Palpation: Non-TTP throughout. No palpable abnormality.   ROM: AROM and PROM of the hip, knee, ankle, and foot intact without pain.   Neuro: TA/EHL/Gastroc/FHL assessed in isolation without deficit.   SILT throughout.    Vascular: Foot is WWP. Capillary refill <3s.         RUE:  Inspection: Small subcentimeter pustule at the dorsal aspect of the Ring Finger P2  Swelling and erythema primarily at P2 of ring finger with some proximal spreading erythema to the 4th MCPj   No purulent drainage from wound; skin otherwise intact throughout   Palpation: TTP at Dorsum of right ring finger; otherwise non-TTP throughout.  Fluctuant swelling at ring finger P2; no expressible purulence    ROM: AROM and PROM of the shoulder, elbow, hand, wrist intact without pain.  Painless active and passive ROM at the MCP/PIP/DIP joints of the ring finger    Neuro: AIN/PIN/Radial/Median/Ulnar Nerves assessed in isolation without deficit.  Able to give thumbs up, make "OK" sign, cross IF/LF, abduct/adduct fingers, make fist   SILT M/U/R nerve distributions.    Vascular: Radial artery palpated 2+. Capillary refill <3s.        Spine/pelvis/axial body:  No tenderness to palpation of cervical, thoracic, or lumbar spine  Stable and without pain with direct anterior pressure over ASIS.  No chest wall or abdominal tenderness  No decubitus ulcers  Muscle tone normal      Significant Labs: All pertinent labs within the past 24 hours have been reviewed.    Significant Imaging: I have reviewed all pertinent imaging results/findings.  "

## 2024-01-17 NOTE — ED PROVIDER NOTES
"Encounter Date: 1/17/2024       History     Chief Complaint   Patient presents with    Hand Pain     "Picked ingrown hair two days ago" and now right 4th finger red swollen and pain      53-year-old male presents with infection to his right ring finger.  It started 2 days ago as a small bump when she picked at and he woke up the next morning with significant swelling.  No fever denies numbness.  He works as a  in his around raw meat.  Has not handled raw chicken in some time.        Review of patient's allergies indicates:  No Known Allergies  Past Medical History:   Diagnosis Date    Arthritis     Kidney stones      Past Surgical History:   Procedure Laterality Date    CYSTOSCOPY W/ LASER LITHOTRIPSY      VASECTOMY       Family History   Problem Relation Age of Onset    Arthritis Father     Cancer Father     Hearing loss Father     Hypertension Father     Cancer Maternal Grandmother     Cancer Paternal Grandfather     Arthritis Paternal Grandmother     Stroke Paternal Grandmother     Asthma Daughter     Hearing loss Maternal Uncle     Hypertension Brother     Hypertension Brother      Social History     Tobacco Use    Smoking status: Never     Passive exposure: Never    Smokeless tobacco: Never   Substance Use Topics    Alcohol use: Yes     Comment: Maybe wine at dinner 1x per week, maybe bourbon 1 night p/wk    Drug use: Yes     Frequency: 7.0 times per week     Types: Marijuana     Comment: Sleep aid only for arthritic purposes. Use for 2 months only     Review of Systems    Physical Exam     Initial Vitals [01/17/24 1250]   BP Pulse Resp Temp SpO2   (!) 164/92 99 20 98.2 °F (36.8 °C) 98 %      MAP       --         Physical Exam    Constitutional: He appears well-developed and well-nourished. No distress.   HENT:   Head: Normocephalic.   Eyes: Conjunctivae are normal. Pupils are equal, round, and reactive to light.   Neck:   Normal range of motion.  Cardiovascular:  Normal rate.           Pulmonary/Chest: " Breath sounds normal.   Musculoskeletal:      Cervical back: Normal range of motion.      Comments: Erythema and swelling to right ring finger                 ED Course   Procedures  Labs Reviewed   CBC W/ AUTO DIFFERENTIAL - Abnormal; Notable for the following components:       Result Value    WBC 14.24 (*)     RBC 6.34 (*)     Hematocrit 54.4 (*)     Gran # (ANC) 10.7 (*)     Immature Grans (Abs) 0.05 (*)     Mono # 1.4 (*)     Gran % 75.0 (*)     Lymph % 13.1 (*)     All other components within normal limits   COMPREHENSIVE METABOLIC PANEL - Abnormal; Notable for the following components:    Total Bilirubin 1.1 (*)     All other components within normal limits   C-REACTIVE PROTEIN - Abnormal; Notable for the following components:    CRP 35.2 (*)     All other components within normal limits   CULTURE, BLOOD   CULTURE, BLOOD   CULTURE, AEROBIC  (SPECIFY SOURCE)   CULTURE, ANAEROBIC   AFB CULTURE & SMEAR   CULTURE, FUNGUS   GRAM STAIN   HIV 1 / 2 ANTIBODY    Narrative:     Release to patient->Immediate   SEDIMENTATION RATE   C-REACTIVE PROTEIN          Imaging Results              X-Ray Hand 3 view Right (Final result)  Result time 01/17/24 14:40:53      Final result by Miguel Brown MD (01/17/24 14:40:53)                   Impression:      See above      Electronically signed by: Miguel Brown MD  Date:    01/17/2024  Time:    14:40               Narrative:    EXAMINATION:  XR HAND COMPLETE 3 VIEW RIGHT    CLINICAL HISTORY:  cellulitis hand;    TECHNIQUE:  PA, lateral, and oblique views of the right hand were performed.    COMPARISON:  None    FINDINGS:  Mild DJD.  No fracture or dislocation.  No bone destruction identified                                       Medications   acetaminophen tablet 650 mg (650 mg Oral Given 1/17/24 1653)   oxyCODONE immediate release tablet 5 mg (5 mg Oral Given 1/17/24 1653)   celecoxib capsule 200 mg (has no administration in time range)   vancomycin (VANCOCIN) 2,500 mg in  dextrose 5 % (D5W) 500 mL IVPB (0 mg Intravenous Stopped 1/17/24 1649)   ceFEPIme (MAXIPIME) 2 g in dextrose 5 % in water (D5W) 100 mL IVPB (MB+) (0 g Intravenous Stopped 1/17/24 1709)   metronidazole IVPB 500 mg (0 mg Intravenous Stopped 1/17/24 1620)     Medical Decision Making  Patient with soft tissue swelling with ecchymosis.  Likely infectious process.  Consult discussed with infectious disease as well as Orthopedics.  Broad-spectrum IV antibiotics given.  Ortho plans to admit.    Amount and/or Complexity of Data Reviewed  Labs: ordered.  Radiology: ordered.    Risk  Prescription drug management.                                      Clinical Impression:  Final diagnoses:  [L03.011] Cellulitis of finger of right hand (Primary)                 Gutierrez Alejandra MD  01/17/24 8242

## 2024-01-17 NOTE — CONSULTS
"[facility] - Emergency Dept  Orthopedics  Consult Note    Patient Name: Chris Nava  MRN: [id number]  Admission Date: 1/17/2024  Hospital Length of Stay: 0 days  Attending Provider: Gutierrez Alejandra MD  Primary Care Provider: Shira Rodríguez NP    Patient information was obtained from patient and ER records.     Inpatient consult to Orthopedic Surgery  Consult performed by: CONCHITA Krause MD  Consult ordered by: Gutierrez Alejandra MD        Subjective:     Principal Problem:Cellulitis of finger of right hand    Chief Complaint:   Chief Complaint   Patient presents with    Hand Pain     "Picked ingrown hair two days ago" and now right 4th finger red swollen and pain         HPI: Chris Nava is a right hand dominant 53 y.o. male w/ no significant PMH, who presents with right ring finger redness and swelling.  Reports on Monday he noticed a small red bump at his right ring finger that became progressively more painful and swollen.  States pain is at the dorsum of his right ring finger and hand but denies any pain at his palm or any other areas of pain at the right upper extremity.  Denies any fevers, chills, and he reports he has not had any recent infections.  Patient is employed as a , ambulates with cane assistance at baseline.  Lives at home by himself, does not smoke, and is not on any blood thinners.      Past Medical History:   Diagnosis Date    Arthritis     Kidney stones        Past Surgical History:   Procedure Laterality Date    CYSTOSCOPY W/ LASER LITHOTRIPSY      VASECTOMY         Review of patient's allergies indicates:  No Known Allergies    Current Facility-Administered Medications   Medication    ceFEPIme (MAXIPIME) 2 g in dextrose 5 % in water (D5W) 100 mL IVPB (MB+)    metronidazole IVPB 500 mg    vancomycin (VANCOCIN) 2,500 mg in dextrose 5 % (D5W) 500 mL IVPB     Current Outpatient Medications   Medication Sig    allopurinoL (ZYLOPRIM) 100 MG tablet Take 200 mg by mouth.    " celecoxib (CELEBREX) 200 MG capsule Take 1 capsule (200 mg total) by mouth once daily.    DULoxetine (CYMBALTA) 20 MG capsule Take 1 capsule (20 mg total) by mouth once daily.    DULoxetine (CYMBALTA) 60 MG capsule Take 1 capsule (60 mg total) by mouth once daily.    lisinopriL (PRINIVIL,ZESTRIL) 20 MG tablet Take 20 mg by mouth.    metFORMIN (GLUCOPHAGE-XR) 500 MG ER 24hr tablet Take 500 mg by mouth every morning.    multivit-min/ferrous fumarate (MULTI VITAMIN ORAL)     potassium citrate (UROCIT-K 15) 15 mEq TbSR Take 1 tablet by mouth once daily.    semaglutide (OZEMPIC SUBQ) Inject 0.75 mg into the skin once a week.     Family History       Problem Relation (Age of Onset)    Arthritis Father, Paternal Grandmother    Asthma Daughter    Cancer Father, Maternal Grandmother, Paternal Grandfather    Hearing loss Father, Maternal Uncle    Hypertension Father, Brother, Brother    Stroke Paternal Grandmother          Tobacco Use    Smoking status: Never     Passive exposure: Never    Smokeless tobacco: Never   Substance and Sexual Activity    Alcohol use: Yes     Comment: Maybe wine at dinner 1x per week, maybe bourbon 1 night p/wk    Drug use: Yes     Frequency: 7.0 times per week     Types: Marijuana     Comment: Sleep aid only for arthritic purposes. Use for 2 months only    Sexual activity: Yes     Partners: Female     Birth control/protection: Post-menopausal     Comment: I have a vasectomy, she is post menopausal     ROS  Constitutional: Denies fever/chills  Eyes: Denies change in vision  ENT: Denies sore throat or rhinorrhea   Respiratory: Denies shortness of breath or cough  Cardiovascular: Denies chest pain or palpitations  Gastrointestinal: Denies abdominal pain, nausea, or vomiting  Genitourinary: Denies dysuria and flank pain  Skin: Denies new rash or skin lesions   Allergic/Immunologic: Denies adverse reactions to current medications  Neurological: Denies headaches or dizziness  Musculoskeletal: see HPI   "  Objective:     Vital Signs (Most Recent):  Temp: 98.2 °F (36.8 °C) (01/17/24 1250)  Pulse: 99 (01/17/24 1250)  Resp: 20 (01/17/24 1250)  BP: (!) 164/92 (01/17/24 1250)  SpO2: 98 % (01/17/24 1250) Vital Signs (24h Range):  Temp:  [98.2 °F (36.8 °C)] 98.2 °F (36.8 °C)  Pulse:  [99] 99  Resp:  [20] 20  SpO2:  [98 %] 98 %  BP: (164)/(92) 164/92     Weight: 135.6 kg (299 lb)  Height: 5' 9" (175.3 cm)  Body mass index is 44.15 kg/m².    No intake or output data in the 24 hours ending 01/17/24 1456     Ortho/SPM Exam  Physical Exam:  General:  no apparent distress, WDWN  HENT:  NCAT, Bilateral ears/eyes normal  CV:  Normal pulses, color, and cap refill  Lungs:  Normal respiratory effort  Neuro: No FND, awake, alert  Psych:  Oriented to Person, Place, Time, and Situation    MSK:       LUE:  Inspection: Skin intact throughout, no swelling, no effusions, no ecchymosis   Palpation: Non-TTP throughout, no palpable abnormality.   ROM: AROM and PROM of the shoulder, elbow, wrist, and hand intact without pain.   Neuro: AIN/PIN/Radial/Median/Ulnar Nerves assessed in isolation without deficit.   SILT throughout.    Vascular: Radial artery palpated 2+. Capillary refill <3s.         LLE:  Inspection: Skin intact throughout, no swelling, no effusions, no ecchymosis   Palpation: Non-TTP throughout. No palpable abnormality.   ROM: AROM and PROM of the hip, knee, ankle, and foot intact without pain.   Neuro: TA/EHL/Gastroc/FHL assessed in isolation without deficit.   SILT throughout.    Vascular: Foot is WWP. Capillary refill <3s.         RLE:  Inspection: Skin intact throughout, no swelling, no effusions, no ecchymosis   Palpation: Non-TTP throughout. No palpable abnormality.   ROM: AROM and PROM of the hip, knee, ankle, and foot intact without pain.   Neuro: TA/EHL/Gastroc/FHL assessed in isolation without deficit.   SILT throughout.    Vascular: Foot is WWP. Capillary refill <3s.         RUE:  Inspection: Small subcentimeter pustule " "at the dorsal aspect of the Ring Finger P2  Swelling and erythema primarily at P2 of ring finger with some proximal spreading erythema to the 4th MCPj   No purulent drainage from wound; skin otherwise intact throughout   Palpation: TTP at Dorsum of right ring finger; otherwise non-TTP throughout.  Fluctuant swelling at ring finger P2; no expressible purulence    ROM: AROM and PROM of the shoulder, elbow, hand, wrist intact without pain.  Painless active and passive ROM at the MCP/PIP/DIP joints of the ring finger    Neuro: AIN/PIN/Radial/Median/Ulnar Nerves assessed in isolation without deficit.  Able to give thumbs up, make "OK" sign, cross IF/LF, abduct/adduct fingers, make fist   SILT M/U/R nerve distributions.    Vascular: Radial artery palpated 2+. Capillary refill <3s.        Spine/pelvis/axial body:  No tenderness to palpation of cervical, thoracic, or lumbar spine  Stable and without pain with direct anterior pressure over ASIS.  No chest wall or abdominal tenderness  No decubitus ulcers  Muscle tone normal      Significant Labs: All pertinent labs within the past 24 hours have been reviewed.    Significant Imaging: I have reviewed all pertinent imaging results/findings.  Assessment/Plan:     * Cellulitis of finger of right hand  Chris Nava is a 53 y.o. male who presents with 2 days of worsening right ring finger swelling and pain.  Patient presented afebrile with labs showing WBC 14.24, ESR 4, CRP 35.2.  On exam, patient had a erythematous fluctuance at the dorsum of the right ring finger P2 with proximal extension to the dorsum of fourth mcp joint.  0/4 kanavel signs, and patient had no other musculoskeletal pains on physical exam.  Bedside I&D was performed, and cultures were obtained.  Plan for admission for 24hrs IV abx for right hand cellulitis, multimodal pain control, and re-evaluation in the morning.      Lab Results   Component Value Date    SEDRATE 4 01/17/2024    CRP 35.2 (H) " 2024    WBC 14.24 (H) 2024       Procedure Note: superficial right ring finger incision and drainage   Risks, benefits, and alternatives to treatment was explained to patient at length. Patient verbalized their understanding and gave consent to proceed. Time out was performed and patient name, , site, and procedure were confirmed. 4 cc of 1% lidocaine was used for local block. Skin was sterilely prepared with betadine. Sterile field was prepped around the surgical site. Skin incision was made over ulnar aspect of the dorsum of right ring finger with scalpel. Incision was taken down bluntly with a hemostat to fluid collection. Serous fluid was expressed intermixed with blood. Fluid sample was obtained and sent for analysis and cultures. Incision was irrigated with 1 L of saline. Soft compressive ace wrap dressing was applied.  Patient encouraged to keep extremity elevated. Patient tolerated the procedure well. Blood loss was minimal.            CONCHITA Krause MD  Orthopedics  [facility] - Emergency Dept

## 2024-01-18 ENCOUNTER — PATIENT MESSAGE (OUTPATIENT)
Dept: ORTHOPEDICS | Facility: CLINIC | Age: 54
End: 2024-01-18
Payer: COMMERCIAL

## 2024-01-18 PROBLEM — L02.511 ABSCESS OF FINGER OF RIGHT HAND: Status: ACTIVE | Noted: 2024-01-18

## 2024-01-18 PROCEDURE — 99213 OFFICE O/P EST LOW 20 MIN: CPT | Mod: ,,, | Performed by: INTERNAL MEDICINE

## 2024-01-18 PROCEDURE — 63600175 PHARM REV CODE 636 W HCPCS

## 2024-01-18 PROCEDURE — G0378 HOSPITAL OBSERVATION PER HR: HCPCS

## 2024-01-18 PROCEDURE — 25000003 PHARM REV CODE 250

## 2024-01-18 PROCEDURE — 96366 THER/PROPH/DIAG IV INF ADDON: CPT

## 2024-01-18 PROCEDURE — 96367 TX/PROPH/DG ADDL SEQ IV INF: CPT

## 2024-01-18 RX ORDER — CEFAZOLIN SODIUM 2 G/50ML
2 SOLUTION INTRAVENOUS
Status: DISCONTINUED | OUTPATIENT
Start: 2024-01-19 | End: 2024-01-18

## 2024-01-18 RX ORDER — LISINOPRIL 20 MG/1
20 TABLET ORAL NIGHTLY
Status: DISCONTINUED | OUTPATIENT
Start: 2024-01-18 | End: 2024-01-21 | Stop reason: HOSPADM

## 2024-01-18 RX ORDER — ALLOPURINOL 100 MG/1
200 TABLET ORAL NIGHTLY
Status: DISCONTINUED | OUTPATIENT
Start: 2024-01-18 | End: 2024-01-21 | Stop reason: HOSPADM

## 2024-01-18 RX ORDER — DULOXETIN HYDROCHLORIDE 60 MG/1
60 CAPSULE, DELAYED RELEASE ORAL NIGHTLY
Status: DISCONTINUED | OUTPATIENT
Start: 2024-01-18 | End: 2024-01-21 | Stop reason: HOSPADM

## 2024-01-18 RX ADMIN — METRONIDAZOLE 500 MG: 500 TABLET ORAL at 01:01

## 2024-01-18 RX ADMIN — OXYCODONE HYDROCHLORIDE 5 MG: 5 TABLET ORAL at 03:01

## 2024-01-18 RX ADMIN — DULOXETINE HYDROCHLORIDE 60 MG: 60 CAPSULE, DELAYED RELEASE ORAL at 08:01

## 2024-01-18 RX ADMIN — CEFEPIME 2 G: 2 INJECTION, POWDER, FOR SOLUTION INTRAVENOUS at 04:01

## 2024-01-18 RX ADMIN — ACETAMINOPHEN 650 MG: 325 TABLET ORAL at 11:01

## 2024-01-18 RX ADMIN — CEFEPIME 2 G: 2 INJECTION, POWDER, FOR SOLUTION INTRAVENOUS at 08:01

## 2024-01-18 RX ADMIN — VANCOMYCIN HYDROCHLORIDE 2000 MG: 500 INJECTION, POWDER, LYOPHILIZED, FOR SOLUTION INTRAVENOUS at 10:01

## 2024-01-18 RX ADMIN — ACETAMINOPHEN 650 MG: 325 TABLET ORAL at 06:01

## 2024-01-18 RX ADMIN — LISINOPRIL 20 MG: 20 TABLET ORAL at 08:01

## 2024-01-18 RX ADMIN — ACETAMINOPHEN 650 MG: 325 TABLET ORAL at 10:01

## 2024-01-18 RX ADMIN — ALLOPURINOL 200 MG: 100 TABLET ORAL at 08:01

## 2024-01-18 RX ADMIN — OXYCODONE HYDROCHLORIDE 5 MG: 5 TABLET ORAL at 08:01

## 2024-01-18 RX ADMIN — METRONIDAZOLE 500 MG: 500 TABLET ORAL at 08:01

## 2024-01-18 RX ADMIN — VANCOMYCIN HYDROCHLORIDE 2000 MG: 500 INJECTION, POWDER, LYOPHILIZED, FOR SOLUTION INTRAVENOUS at 06:01

## 2024-01-18 RX ADMIN — ACETAMINOPHEN 650 MG: 325 TABLET ORAL at 04:01

## 2024-01-18 RX ADMIN — METRONIDAZOLE 500 MG: 500 TABLET ORAL at 02:01

## 2024-01-18 RX ADMIN — METRONIDAZOLE 500 MG: 500 TABLET ORAL at 06:01

## 2024-01-18 RX ADMIN — CELECOXIB 200 MG: 200 CAPSULE ORAL at 08:01

## 2024-01-18 RX ADMIN — CEFEPIME 2 G: 2 INJECTION, POWDER, FOR SOLUTION INTRAVENOUS at 01:01

## 2024-01-18 NOTE — SUBJECTIVE & OBJECTIVE
Principal Problem:Cellulitis of finger of right hand    Principal Orthopedic Problem: as above    Interval History: Afebrile, VSS, NAEON.  Pain has been reportedly well controlled.  Currently receiving IV vanc/cefepime.  Cultures from dorsal abscess at ring finger P2 currently pending, and gram stain negative.      Review of patient's allergies indicates:  No Known Allergies    Current Facility-Administered Medications   Medication    acetaminophen tablet 650 mg    ceFEPIme (MAXIPIME) 2 g in dextrose 5 % in water (D5W) 100 mL IVPB (MB+)    celecoxib capsule 200 mg    metroNIDAZOLE tablet 500 mg    oxyCODONE immediate release tablet 5 mg    vancomycin 2 g in dextrose 5 % 500 mL IVPB     Current Outpatient Medications   Medication Sig    allopurinoL (ZYLOPRIM) 100 MG tablet Take 200 mg by mouth nightly.    celecoxib (CELEBREX) 200 MG capsule Take 1 capsule (200 mg total) by mouth once daily.    DULoxetine (CYMBALTA) 20 MG capsule Take 1 capsule (20 mg total) by mouth once daily. (Patient taking differently: Take 20 mg by mouth nightly.)    DULoxetine (CYMBALTA) 60 MG capsule Take 1 capsule (60 mg total) by mouth once daily. (Patient taking differently: Take 60 mg by mouth nightly.)    lisinopriL (PRINIVIL,ZESTRIL) 20 MG tablet Take 20 mg by mouth nightly.    metFORMIN (GLUCOPHAGE-XR) 500 MG ER 24hr tablet Take 500 mg by mouth every morning.    multivit-min/ferrous fumarate (MULTI VITAMIN ORAL) Take 1 tablet by mouth once daily.    potassium citrate (UROCIT-K 15) 15 mEq TbSR Take 1 tablet by mouth nightly.     Objective:     Vital Signs (Most Recent):  Temp: 98.9 °F (37.2 °C) (01/18/24 0721)  Pulse: 79 (01/18/24 0721)  Resp: 18 (01/18/24 0329)  BP: 137/84 (01/18/24 0721)  SpO2: 96 % (01/18/24 0721) Vital Signs (24h Range):  Temp:  [98 °F (36.7 °C)-98.9 °F (37.2 °C)] 98.9 °F (37.2 °C)  Pulse:  [78-99] 79  Resp:  [16-20] 18  SpO2:  [96 %-98 %] 96 %  BP: (135-168)/(78-95) 137/84     Weight: 135.6 kg (299 lb)  Height: 5'  "9" (175.3 cm)  Body mass index is 44.15 kg/m².      Intake/Output Summary (Last 24 hours) at 1/18/2024 0798  Last data filed at 1/18/2024 0325  Gross per 24 hour   Intake 698.3 ml   Output --   Net 698.3 ml        Ortho/SPM Exam  AAOx4  NAD  Reg rate  No increased WOB    RUE:  Dressing c/d/i; removed   Underlying I&D site without active drainage   SILT   Full active and passive ROM at Dip/Pip/Mcp joints   WWP extremities      Significant Labs: All pertinent labs within the past 24 hours have been reviewed.    Significant Imaging: I have reviewed and interpreted all pertinent imaging results/findings.  "

## 2024-01-18 NOTE — CONSULTS
[facility]  Infectious Disease  Consult Note    Patient Name: Chris Nava  MRN: [id number]  Admission Date: 1/17/2024  Hospital Length of Stay: 1 days  Attending Physician: Gayle Ross MD  Primary Care Provider: Shira Rodríguez NP     Isolation Status: No active isolations    Patient information was obtained from patient, past medical records, and ER records.      Inpatient consult to Infectious Diseases  Consult performed by: Bindu Esparza MD  Consult ordered by: Gutierrez Alejandra MD        Assessment/Plan:     ID  * Cellulitis of finger of right hand  I have reviewed hospital notes from  Orthopedic Surgery service and other specialty providers. I have also reviewed CBC, CMP/BMP,  cultures and imaging with my interpretation as documented.     Cellulitis and abscess of the right fourth finger extending into the hand and wrist. Concern for Staphylococcal infection in the setting of purulent cellulitis despite negative gram stain. Post rounds cultures with S aureus pending susceptibilities. Patient reporting worsening swelling and dark discoloration of the finger this afternoon.   Discontinue cefepime.  Start cefazolin 2 gm every 8 hours.  Continue vancomycin.  PharmD vancomycin monitoring protocol.   Repeat CBC tomorrow to ensure leukocytosis is resolving.   Monitor closely for progression to tenosynovitis and/or necrosis of the soft tissues as patient reports worsening symptoms this afternoon.  Discussed management plan with the staff and/or members from Orthopedic Surgery service.      Abscess of finger of right hand  S/P I&D on 1/17. Cultures with S aureus.  Continue antibiotics as in cellulitis.  Will need to monitor response over the next 24 hours to ensure clinical improvement prior to discharge.         Thank you for your consult. I will follow-up with patient. Please contact us if you have any additional questions.    Bindu Dumont MD  Infectious  "Disease  [facility] - Emergency [facility]    Subjective:     Principal Problem: Cellulitis of finger of right hand    HPI: A 53-year-old man whom two days prior to admission developed swelling and erythema to his right 4th finger. He noted a small pustule and attempted to self treat by using tweezers. Unfortunately, he developed progressively worsening swelling, redness and dark discoloration of his finger and extending over the dorsum of his hand. He sought care in [facility] ED due to pain, swelling and erythema. On evaluation he was afebrile and with leukocytosis. He was evaluated by Orthopedic Surgery and underwent I&D of an abscess. He was admitted and started on empiric cefepime, Flagyl, and vancomycin while awaiting culture results.     Mr. Nava has NKDA. He is a . He denies fresh water and shellfish exposure.     Infectious Diseases consulted for "hand infection"        Past Medical History:   Diagnosis Date    Arthritis     Kidney stones        Past Surgical History:   Procedure Laterality Date    CYSTOSCOPY W/ LASER LITHOTRIPSY      VASECTOMY         Review of patient's allergies indicates:  No Known Allergies    Medications:  Medications Prior to Admission   Medication Sig    allopurinoL (ZYLOPRIM) 100 MG tablet Take 200 mg by mouth nightly.    celecoxib (CELEBREX) 200 MG capsule Take 1 capsule (200 mg total) by mouth once daily.    DULoxetine (CYMBALTA) 20 MG capsule Take 1 capsule (20 mg total) by mouth once daily. (Patient taking differently: Take 20 mg by mouth nightly.)    DULoxetine (CYMBALTA) 60 MG capsule Take 1 capsule (60 mg total) by mouth once daily. (Patient taking differently: Take 60 mg by mouth nightly.)    lisinopriL (PRINIVIL,ZESTRIL) 20 MG tablet Take 20 mg by mouth nightly.    metFORMIN (GLUCOPHAGE-XR) 500 MG ER 24hr tablet Take 500 mg by mouth every morning.    multivit-min/ferrous fumarate (MULTI VITAMIN ORAL) Take 1 tablet by mouth once daily.    potassium citrate (UROCIT-K 15) 15 mEq " TbSR Take 1 tablet by mouth nightly.     Antibiotics (From admission, onward)      Start     Stop Route Frequency Ordered    01/19/24 0030  cefazolin (ANCEF) 2 gram in dextrose 5% 50 mL IVPB (premix)         -- IV Every 8 hours (non-standard times) 01/18/24 1803    01/18/24 0630  vancomycin 2 g in dextrose 5 % 500 mL IVPB  (vancomycin IVPB (PEDS and ADULTS))         -- IV Every 12 hours (non-standard times) 01/17/24 2329    01/18/24 0100  metroNIDAZOLE tablet 500 mg         -- Oral Every 8 hours 01/17/24 2318          Antifungals (From admission, onward)      None          Antivirals (From admission, onward)      None             Immunization History   Administered Date(s) Administered    COVID-19, MRNA, LN-S, PF (Pfizer) (Purple Cap) 05/27/2021, 06/17/2021    Influenza - Quadrivalent - PF *Preferred* (6 months and older) 10/11/2023    Td - PF (ADULT) 10/11/2023       Family History       Problem Relation (Age of Onset)    Arthritis Father, Paternal Grandmother    Asthma Daughter    Cancer Father, Maternal Grandmother, Paternal Grandfather    Hearing loss Father, Maternal Uncle    Hypertension Father, Brother, Brother    Stroke Paternal Grandmother          Social History     Socioeconomic History    Marital status:    Tobacco Use    Smoking status: Never     Passive exposure: Never    Smokeless tobacco: Never   Substance and Sexual Activity    Alcohol use: Yes     Comment: Maybe wine at dinner 1x per week, maybe bourbon 1 night p/wk    Drug use: Yes     Frequency: 7.0 times per week     Types: Marijuana     Comment: Sleep aid only for arthritic purposes. Use for 2 months only    Sexual activity: Yes     Partners: Female     Birth control/protection: Post-menopausal     Comment: I have a vasectomy, she is post menopausal     Social Determinants of Health     Financial Resource Strain: Low Risk  (1/18/2024)    Overall Financial Resource Strain (CARDIA)     Difficulty of Paying Living Expenses: Not very hard    Food Insecurity: No Food Insecurity (1/18/2024)    Hunger Vital Sign     Worried About Running Out of Food in the Last Year: Never true     Ran Out of Food in the Last Year: Never true   Transportation Needs: No Transportation Needs (1/18/2024)    PRAPARE - Transportation     Lack of Transportation (Medical): No     Lack of Transportation (Non-Medical): No   Physical Activity: Inactive (1/18/2024)    Exercise Vital Sign     Days of Exercise per Week: 0 days     Minutes of Exercise per Session: 0 min   Stress: Stress Concern Present (1/18/2024)    [employer] - Occupational Stress Questionnaire     Feeling of Stress : To some extent   Social Connections: Socially Isolated (1/18/2024)    Social Connection and Isolation Panel [NHANES]     Frequency of Communication with Friends and Family: More than three times a week     Frequency of Social Gatherings with Friends and Family: More than three times a week     Attends Evangelical Services: Never     Active Member of Clubs or Organizations: No     Attends Club or Organization Meetings: Never     Marital Status:    Housing Stability: Unknown (1/18/2024)    Housing Stability Vital Sign     Unable to Pay for Housing in the Last Year: No     Unstable Housing in the Last Year: No     Review of Systems   Constitutional:  Negative for chills, fatigue and fever.   HENT:  Negative for ear pain, mouth sores, nosebleeds, postnasal drip, rhinorrhea, sinus pressure, sore throat, tinnitus, trouble swallowing and voice change.    Eyes:  Negative for photophobia, pain, redness and visual disturbance.   Respiratory:  Negative for apnea, cough, chest tightness, shortness of breath and wheezing.    Cardiovascular:  Negative for chest pain, palpitations and leg swelling.   Gastrointestinal:  Negative for abdominal pain, blood in stool, constipation, diarrhea, nausea and vomiting.   Endocrine: Negative for cold intolerance, heat intolerance, polydipsia  and polyuria.   Genitourinary:  Negative for decreased urine volume, difficulty urinating, dysuria, flank pain, frequency, genital sores, hematuria, penile discharge, penile pain, penile swelling, scrotal swelling, testicular pain and urgency.   Musculoskeletal:  Negative for arthralgias, back pain, joint swelling, myalgias and neck pain.   Skin:  Positive for color change and wound. Negative for rash.   Allergic/Immunologic: Negative for environmental allergies and food allergies.   Neurological:  Negative for dizziness, seizures, syncope, weakness, light-headedness, numbness and headaches.   Hematological:  Negative for adenopathy. Does not bruise/bleed easily.   Psychiatric/Behavioral:  Negative for agitation, confusion, decreased concentration, hallucinations, self-injury, sleep disturbance and suicidal ideas. The patient is not nervous/anxious.      Objective:     Vital Signs (Most Recent):  Temp: 98.6 °F (37 °C) (01/18/24 1836)  Pulse: 92 (01/18/24 1836)  Resp: 18 (01/18/24 1836)  BP: (!) 162/85 (01/18/24 1836)  SpO2: 97 % (01/18/24 1836) Vital Signs (24h Range):  Temp:  [97.7 °F (36.5 °C)-98.9 °F (37.2 °C)] 98.6 °F (37 °C)  Pulse:  [75-92] 92  Resp:  [16-20] 18  SpO2:  [95 %-99 %] 97 %  BP: (133-162)/() 162/85     Weight: 135.6 kg (299 lb)  Body mass index is 44.15 kg/m².    Estimated Creatinine Clearance: 142.7 mL/min (based on SCr of 0.8 mg/dL).     Physical Exam  Vitals reviewed.   Constitutional:       Appearance: He is well-developed. He is ill-appearing.      Interventions: He is sedated and intubated.   HENT:      Head: Normocephalic and atraumatic.      Right Ear: External ear normal.      Left Ear: External ear normal.   Eyes:      Conjunctiva/sclera: Conjunctivae normal.      Pupils: Pupils are equal, round, and reactive to light.   Neck:      Thyroid: No thyromegaly.   Cardiovascular:      Rate and Rhythm: Normal rate and regular rhythm.      Heart sounds: Normal heart sounds. No murmur  heard.  Pulmonary:      Effort: Pulmonary effort is normal. He is intubated.      Breath sounds: Normal breath sounds. No wheezing or rales.   Abdominal:      General: Bowel sounds are normal.      Palpations: Abdomen is soft. There is no mass.      Tenderness: There is no abdominal tenderness. There is no rebound.   Musculoskeletal:      Cervical back: Normal range of motion and neck supple.      Comments: Right fourth finger wrapped with compression dressing extending into the wrist.   Lymphadenopathy:      Cervical: No cervical adenopathy.   Skin:     General: Skin is warm and dry.   Neurological:      Mental Status: He is lethargic.          Significant Labs: Blood Culture:   Recent Labs   Lab 01/17/24  1403   LABBLOO No Growth to date  No Growth to date  No Growth to date  No Growth to date     BMP:   Recent Labs   Lab 01/17/24  1403   GLU 89      K 4.3      CO2 27   BUN 12   CREATININE 0.8   CALCIUM 10.0     CBC:   Recent Labs   Lab 01/17/24  1403   WBC 14.24*   HGB 17.5   HCT 54.4*        Microbiology Results (last 7 days)       Procedure Component Value Units Date/Time    Blood Culture #1 **CANNOT BE ORDERED STAT** [[id number]] Collected: 01/17/24 1403    Order Status: Completed Specimen: Blood from Peripheral, Antecubital, Left Updated: 01/18/24 1612     Blood Culture, Routine No Growth to date      No Growth to date    Blood Culture #2 **CANNOT BE ORDERED STAT** [[id number]] Collected: 01/17/24 1403    Order Status: Completed Specimen: Blood from Peripheral, Antecubital, Right Updated: 01/18/24 1612     Blood Culture, Routine No Growth to date      No Growth to date    AFB Culture & Smear [[id number]] Collected: 01/17/24 1604    Order Status: Completed Specimen: Abscess from Finger, Right Hand Updated: 01/18/24 1415     AFB CULTURE STAIN No acid fast bacilli seen.    Narrative:      Right Ring Finger    Aerobic culture [[id number]]  (Abnormal) Collected: 01/17/24 1604    Order Status:  Completed Specimen: Abscess from Finger, Right Hand Updated: 01/18/24 1324     Aerobic Bacterial Culture STAPHYLOCOCCUS AUREUS  Many  Susceptibility pending      Narrative:      Right Ring Finger    Culture, Anaerobic [[id number]] Collected: 01/17/24 1604    Order Status: Completed Specimen: Abscess from Finger, Right Hand Updated: 01/18/24 0718     Anaerobic Culture Culture in progress    Narrative:      Right Ring Finger    Gram stain [[id number]] Collected: 01/17/24 1604    Order Status: Completed Specimen: Abscess from Finger, Right Hand Updated: 01/17/24 1828     Gram Stain Result Rare WBC's      No organisms seen    Narrative:      Right Ring Finger    Fungus culture [[id number]] Collected: 01/17/24 1604    Order Status: Sent Specimen: Abscess from Finger, Right Hand Updated: 01/17/24 4475            Significant Imaging: I have reviewed all pertinent imaging results/findings within the past 24 hours.

## 2024-01-18 NOTE — ASSESSMENT & PLAN NOTE
Chris Nava is a 53 y.o. male who presents with 2 days of worsening right ring finger swelling and pain.  Patient presented afebrile with labs showing WBC 14.24, ESR 4, CRP 35.2.  On exam, patient had a erythematous fluctuance at the dorsum of the right ring finger P2 with proximal extension to the dorsum of fourth mcp joint.  0/4 kanavel signs, and patient had no other musculoskeletal pains on physical exam.  Bedside I&D was performed, and cultures were obtained.      - Admitted for 24hr IV abx  - Cultures currently pending; Gram stain negative  - ID consulted, appreciate recs  - Abx: vanc/cefepime     » Dispo: f/u ID recs

## 2024-01-18 NOTE — ED NOTES
Assumed care at this time  The patient is awake, alert and cooperative with a calm affect, patient is aware of environment. Airway is open and patent, respirations are spontaneous, normal respiratory effort and rate noted, skin warm and dry, moves all extremities well, appearance: no apparent distress noted.Call light in reach. Family at the bedside

## 2024-01-18 NOTE — ED NOTES
LOC: The patient is awake, alert and aware of environment with an appropriate affect, the patient is oriented x 3 and speaking appropriately.  APPEARANCE: Patient c/o pain 7/10 but is no acute distress, patient is clean and well groomed. Pain med offered; pt accepts offer  SKIN: The skin is warm and dry, color consistent with ethnicity, right hand is wrapped in ACE bandage  MUSKULOSKELETAL: Patient moving all extremities well, no obvious swelling or deformities noted.  RESPIRATORY: Airway is open and patent, respirations are spontaneous, patient has a normal effort and rate, no accessory muscle use noted.   NEUROLOGIC: Eyes open spontaneously, behavior appropriate to situation, follows commands

## 2024-01-18 NOTE — PROGRESS NOTES
[facility]  Orthopedics  Progress Note    Patient Name: Chris Nava  MRN: [id number]  Admission Date: 1/17/2024  Hospital Length of Stay: 1 days  Attending Provider: Gayle Ross MD  Primary Care Provider: Shira Rodríguez NP    Subjective:     Principal Problem:Cellulitis of finger of right hand    Principal Orthopedic Problem: as above    Interval History: Afebrile, VSS, NAEON.  Pain has been reportedly well controlled.  Currently receiving IV vanc/cefepime.  Cultures from dorsal abscess at ring finger P2 currently pending, and gram stain negative.      Review of patient's allergies indicates:  No Known Allergies    Current Facility-Administered Medications   Medication    acetaminophen tablet 650 mg    ceFEPIme (MAXIPIME) 2 g in dextrose 5 % in water (D5W) 100 mL IVPB (MB+)    celecoxib capsule 200 mg    metroNIDAZOLE tablet 500 mg    oxyCODONE immediate release tablet 5 mg    vancomycin 2 g in dextrose 5 % 500 mL IVPB     Current Outpatient Medications   Medication Sig    allopurinoL (ZYLOPRIM) 100 MG tablet Take 200 mg by mouth nightly.    celecoxib (CELEBREX) 200 MG capsule Take 1 capsule (200 mg total) by mouth once daily.    DULoxetine (CYMBALTA) 20 MG capsule Take 1 capsule (20 mg total) by mouth once daily. (Patient taking differently: Take 20 mg by mouth nightly.)    DULoxetine (CYMBALTA) 60 MG capsule Take 1 capsule (60 mg total) by mouth once daily. (Patient taking differently: Take 60 mg by mouth nightly.)    lisinopriL (PRINIVIL,ZESTRIL) 20 MG tablet Take 20 mg by mouth nightly.    metFORMIN (GLUCOPHAGE-XR) 500 MG ER 24hr tablet Take 500 mg by mouth every morning.    multivit-min/ferrous fumarate (MULTI VITAMIN ORAL) Take 1 tablet by mouth once daily.    potassium citrate (UROCIT-K 15) 15 mEq TbSR Take 1 tablet by mouth nightly.     Objective:     Vital Signs (Most Recent):  Temp: 98.9 °F (37.2 °C) (01/18/24 0721)  Pulse: 79 (01/18/24 0721)  Resp: 18 (01/18/24  "0329)  BP: 137/84 (01/18/24 0721)  SpO2: 96 % (01/18/24 0721) Vital Signs (24h Range):  Temp:  [98 °F (36.7 °C)-98.9 °F (37.2 °C)] 98.9 °F (37.2 °C)  Pulse:  [78-99] 79  Resp:  [16-20] 18  SpO2:  [96 %-98 %] 96 %  BP: (135-168)/(78-95) 137/84     Weight: 135.6 kg (299 lb)  Height: 5' 9" (175.3 cm)  Body mass index is 44.15 kg/m².      Intake/Output Summary (Last 24 hours) at 1/18/2024 0735  Last data filed at 1/18/2024 0325  Gross per 24 hour   Intake 698.3 ml   Output --   Net 698.3 ml        Ortho/SPM Exam  AAOx4  NAD  Reg rate  No increased WOB    RUE:  Dressing c/d/i; removed   Underlying I&D site without active drainage   SILT   Full active and passive ROM at Dip/Pip/Mcp joints   WWP extremities      Significant Labs: All pertinent labs within the past 24 hours have been reviewed.    Significant Imaging: I have reviewed and interpreted all pertinent imaging results/findings.  Assessment/Plan:     * Cellulitis of finger of right hand  Chris Nava is a 53 y.o. male who presents with 2 days of worsening right ring finger swelling and pain.  Patient presented afebrile with labs showing WBC 14.24, ESR 4, CRP 35.2.  On exam, patient had a erythematous fluctuance at the dorsum of the right ring finger P2 with proximal extension to the dorsum of fourth mcp joint.  0/4 kanavel signs, and patient had no other musculoskeletal pains on physical exam.  Bedside I&D was performed, and cultures were obtained.      - Admitted for 24hr IV abx  - Cultures currently pending; Gram stain negative  - ID consulted, appreciate recs  - Abx: vanc/cefepime     » Dispo: f/u ID recs           CONCHITA Krause MD  Orthopedics  [facility] - Emergency Dept    "

## 2024-01-18 NOTE — PLAN OF CARE
[facility]  Initial Discharge Assessment       Primary Care Provider: Shira Rodríguez NP    Admission Diagnosis: Cellulitis of finger of right hand [L03.011]    Admission Date: 1/17/2024  Expected Discharge Date:     Pt stated he uses a straight cane to assist with ambulation and is independent with his ADL's and does not require assistance    Pt to d/c home with no needs when ready    Transition of Care Barriers: (P) None    Payor: BLUE CROSS BLUE SHIELD / Plan: [place] PPO / Product Type: PPO /     Extended Emergency Contact Information  Primary Emergency Contact: Mary Brumfield  Address: [address]  Mobile Phone: [phone]  Relation: Friend  Secondary Emergency Contact: Bruna Nava  Address: [address] [place] [address]  Home Phone: [phone]  Mobile Phone: [phone]  Relation: Mother    Discharge Plan A: (P) Home  Discharge Plan B: (P) Home      [id number] [place] - [address]  [address] [facility] [address]  Phone: [phone] Fax: [phone]    [id number] - [place] - [address] AT [facility]  [address] [facility] [address]  Phone: [phone] Fax: [phone]      Initial Assessment (most recent)       Adult Discharge Assessment - 01/18/24 0802          Discharge Assessment    Assessment Type Discharge Planning Assessment (P)      Confirmed/corrected address, phone number and insurance Yes (P)      Confirmed Demographics Correct on Facesheet (P)      Source of Information patient (P)      Reason For Admission Cellulitis of finger of right hand (P)      People in Home alone (P)      Facility Arrived From: home (P)      Do you expect to return to your current living situation? Yes (P)      Do you have help at home or someone to help you manage your care at home? No (P)      Prior to  hospitilization cognitive status: Alert/Oriented;No Deficits (P)      Current cognitive status: No Deficits;Alert/Oriented (P)      Walking or Climbing Stairs Difficulty yes (P)      Walking or Climbing Stairs ambulation difficulty, requires equipment (P)      Mobility Management straight cane (P)      Dressing/Bathing Difficulty no (P)      Home Accessibility not wheelchair accessible;stairs within home (P)      Number of Stairs, Within Home, Primary ten (P)      Home Layout Able to live on 1st floor;Bathroom on 2nd floor;Bedroom on 2nd floor (P)      Equipment Currently Used at Home cane, straight (P)      Patient currently being followed by outpatient case management? No (P)      Do you currently have service(s) that help you manage your care at home? No (P)      Do you have any problems affording any of your prescribed medications? No (P)      Is the patient taking medications as prescribed? yes (P)      Who is going to help you get home at discharge? family/friends (P)      How do you get to doctors appointments? car, drives self (P)      Are you on dialysis? No (P)      Do you take coumadin? No (P)      Discharge Plan A Home (P)      Discharge Plan B Home (P)      DME Needed Upon Discharge  none (P)      Discharge Plan discussed with: Patient (P)      Transition of Care Barriers None (P)         Physical Activity    On average, how many days per week do you engage in moderate to strenuous exercise (like a brisk walk)? 0 days (P)      On average, how many minutes do you engage in exercise at this level? 0 min (P)         Financial Resource Strain    How hard is it for you to pay for the very basics like food, housing, medical care, and heating? Not very hard (P)         Housing Stability    In the last 12 months, was there a time when you were not able to pay the mortgage or rent on time? No (P)      In the last 12 months, was there a time when you did not have a steady place to sleep or slept in a shelter  (including now)? No (P)         Transportation Needs    In the past 12 months, has lack of transportation kept you from medical appointments or from getting medications? No (P)      In the past 12 months, has lack of transportation kept you from meetings, work, or from getting things needed for daily living? No (P)         Food Insecurity    Within the past 12 months, you worried that your food would run out before you got the money to buy more. Never true (P)      Within the past 12 months, the food you bought just didn't last and you didn't have money to get more. Never true (P)         Stress    Do you feel stress - tense, restless, nervous, or anxious, or unable to sleep at night because your mind is troubled all the time - these days? To some extent (P)         Social Connections    In a typical week, how many times do you talk on the phone with family, friends, or neighbors? More than three times a week (P)      How often do you get together with friends or relatives? More than three times a week (P)      How often do you attend Oriental orthodox or Hindu services? Never (P)      Do you belong to any clubs or organizations such as Oriental orthodox groups, unions, fraternal or athletic groups, or school groups? No (P)      How often do you attend meetings of the clubs or organizations you belong to? Never (P)      Are you , , , , never , or living with a partner?  (P)         Alcohol Use    Q1: How often do you have a drink containing alcohol? Monthly or less (P)      Q2: How many drinks containing alcohol do you have on a typical day when you are drinking? 1 or 2 (P)      Q3: How often do you have six or more drinks on one occasion? Never (P)                       Maryam Wheeler CD, MSW, LMSW, RSW   Case Management  [facility]  Email: [email or web address]

## 2024-01-18 NOTE — PROGRESS NOTES
"Pharmacokinetic Initial Assessment & Plan: IV Vancomycin    Intravenous Vancomycin 2500 mg x once LD was given in the ED on 01/17 at 1419  Then Vancomycin 2000 mg every 12 hours  Obtain a Vancomycin trough 30 mins prior to the 4 th dose on 01/19 @ 0600  Desired empiric serum trough concentration is 15 to 20 mcg/mL    Pharmacy will continue to follow and monitor vancomycin.    [id number] with any questions regarding this assessment.     Thank you for the consult,   William Robert       Patient brief summary:  Chris Nava is a 53 y.o. male initiated on antimicrobial therapy with IV Vancomycin for treatment of suspected bone/joint infection    Drug Allergies:   Review of patient's allergies indicates:  No Known Allergies    Actual Body Weight:   135.6 kg    Renal Function:   Estimated Creatinine Clearance: 142.7 mL/min (based on SCr of 0.8 mg/dL).,     CBC (last 72 hours):  Recent Labs   Lab Result Units 01/17/24  1403   WBC K/uL 14.24*   Hemoglobin g/dL 17.5   Hematocrit % 54.4*   Platelets K/uL 245   Gran % % 75.0*   Lymph % % 13.1*   Mono % % 9.5   Eosinophil % % 1.6   Basophil % % 0.4   Differential Method  Automated       Metabolic Panel (last 72 hours):  Recent Labs   Lab Result Units 01/17/24  1403   Sodium mmol/L 137   Potassium mmol/L 4.3   Chloride mmol/L 101   CO2 mmol/L 27   Glucose mg/dL 89   BUN mg/dL 12   Creatinine mg/dL 0.8   Albumin g/dL 4.1   Total Bilirubin mg/dL 1.1*   Alkaline Phosphatase U/L 95   AST U/L 25   ALT U/L 34       Drug levels (last 3 results):  No results for input(s): "VANCOMYCINRA", "VANCORANDOM", "VANCOMYCINPE", "VANCOPEAK", "VANCOMYCINTR", "VANCOTROUGH" in the last 72 hours.    Microbiologic Results:  Microbiology Results (last 7 days)       Procedure Component Value Units Date/Time    Blood Culture #2 **CANNOT BE ORDERED STAT** [[id number]] Collected: 01/17/24 1403    Order Status: Completed Specimen: Blood from Peripheral, Antecubital, Right Updated: 01/17/24 2115     " Blood Culture, Routine No Growth to date    Blood Culture #1 **CANNOT BE ORDERED STAT** [[id number]] Collected: 01/17/24 1403    Order Status: Completed Specimen: Blood from Peripheral, Antecubital, Left Updated: 01/17/24 2115     Blood Culture, Routine No Growth to date    Gram stain [[id number]] Collected: 01/17/24 1604    Order Status: Completed Specimen: Abscess from Finger, Right Hand Updated: 01/17/24 1828     Gram Stain Result Rare WBC's      No organisms seen    Narrative:      Right Ring Finger    Aerobic culture [[id number]] Collected: 01/17/24 1604    Order Status: Sent Specimen: Abscess from Finger, Right Hand Updated: 01/17/24 1739    Culture, Anaerobic [[id number]] Collected: 01/17/24 1604    Order Status: Sent Specimen: Abscess from Finger, Right Hand Updated: 01/17/24 1738    AFB Culture & Smear [[id number]] Collected: 01/17/24 1604    Order Status: Sent Specimen: Abscess from Finger, Right Hand Updated: 01/17/24 1738    Fungus culture [[id number]] Collected: 01/17/24 1604    Order Status: Sent Specimen: Abscess from Finger, Right Hand Updated: 01/17/24 1737

## 2024-01-18 NOTE — ED NOTES
Assumed pt care at this time. The patient is awake, alert and cooperative with a calm affect, patient is aware of environment. Airway is open and patent, respirations are spontaneous, normal respiratory effort and rate noted, skin warm and dry- cellulitis noted on R 4th finger, moves all extremities well, appearance: no apparent distress noted. Pt states no needs at this time. Bed locked and in lowest position with side rails up, call light within reach.

## 2024-01-18 NOTE — PHARMACY MED REC
"Admission Medication History     The home medication history was taken by Quentin Stoll.    You may go to "Admission" then "Reconcile Home Medications" tabs to review and/or act upon these items.     The home medication list has been updated by the Pharmacy department.   Please read ALL comments highlighted in yellow.   Please address this information as you see fit.    Feel free to contact us if you have any questions or require assistance.      The medications listed below were removed from the home medication list. Please reorder if appropriate:  Patient reports no longer taking the following medication(s):  OZEMPIC INSULIN PEN      Medications listed below were obtained from: Patient/family and Analytic software- [employer]  Current Outpatient Medications on File Prior to Encounter   Medication Sig    allopurinoL (ZYLOPRIM) 100 MG tablet   Take 200 mg by mouth nightly.    celecoxib (CELEBREX) 200 MG capsule   Take 1 capsule (200 mg total) by mouth once daily.    DULoxetine (CYMBALTA) 20 MG capsule     Take 1 capsule (20 mg total) by mouth nightly with 60 mg (80 mg).     DULoxetine (CYMBALTA) 60 MG capsule     Take 1 capsule (60 mg total) by mouth nightly with 20 mg (80 mg).    lisinopriL (PRINIVIL,ZESTRIL) 20 MG tablet   Take 20 mg by mouth nightly.    metFORMIN (GLUCOPHAGE-XR) 500 MG ER 24hr tablet   Take 500 mg by mouth every morning.    multivit-min/ferrous fumarate (MULTI VITAMIN ORAL)   Take 1 tablet by mouth once daily.    potassium citrate (UROCIT-K 15) 15 mEq TbSR   Take 1 tablet by mouth nightly.             Quentin Stoll  EXT [id number]                  .          "

## 2024-01-19 LAB
ALBUMIN SERPL BCP-MCNC: 3.5 G/DL (ref 3.5–5.2)
ALP SERPL-CCNC: 95 U/L (ref 55–135)
ALT SERPL W/O P-5'-P-CCNC: 26 U/L (ref 10–44)
ANION GAP SERPL CALC-SCNC: 8 MMOL/L (ref 8–16)
AST SERPL-CCNC: 20 U/L (ref 10–40)
BACTERIA SPEC AEROBE CULT: ABNORMAL
BASOPHILS # BLD AUTO: 0.08 K/UL (ref 0–0.2)
BASOPHILS NFR BLD: 0.6 % (ref 0–1.9)
BILIRUB SERPL-MCNC: 1.1 MG/DL (ref 0.1–1)
BUN SERPL-MCNC: 12 MG/DL (ref 6–20)
CALCIUM SERPL-MCNC: 9.5 MG/DL (ref 8.7–10.5)
CHLORIDE SERPL-SCNC: 105 MMOL/L (ref 95–110)
CO2 SERPL-SCNC: 21 MMOL/L (ref 23–29)
CREAT SERPL-MCNC: 0.6 MG/DL (ref 0.5–1.4)
CRP SERPL-MCNC: 120.4 MG/L (ref 0–8.2)
DIFFERENTIAL METHOD BLD: ABNORMAL
EOSINOPHIL # BLD AUTO: 0.3 K/UL (ref 0–0.5)
EOSINOPHIL NFR BLD: 2.5 % (ref 0–8)
ERYTHROCYTE [DISTWIDTH] IN BLOOD BY AUTOMATED COUNT: 14.4 % (ref 11.5–14.5)
EST. GFR  (NO RACE VARIABLE): >60 ML/MIN/1.73 M^2
GLUCOSE SERPL-MCNC: 102 MG/DL (ref 70–110)
HCT VFR BLD AUTO: 52.2 % (ref 40–54)
HGB BLD-MCNC: 16.8 G/DL (ref 14–18)
IMM GRANULOCYTES # BLD AUTO: 0.05 K/UL (ref 0–0.04)
IMM GRANULOCYTES NFR BLD AUTO: 0.4 % (ref 0–0.5)
LYMPHOCYTES # BLD AUTO: 1.8 K/UL (ref 1–4.8)
LYMPHOCYTES NFR BLD: 12.9 % (ref 18–48)
MCH RBC QN AUTO: 27.6 PG (ref 27–31)
MCHC RBC AUTO-ENTMCNC: 32.2 G/DL (ref 32–36)
MCV RBC AUTO: 86 FL (ref 82–98)
MONOCYTES # BLD AUTO: 1.4 K/UL (ref 0.3–1)
MONOCYTES NFR BLD: 10.2 % (ref 4–15)
NEUTROPHILS # BLD AUTO: 10.1 K/UL (ref 1.8–7.7)
NEUTROPHILS NFR BLD: 73.4 % (ref 38–73)
NRBC BLD-RTO: 0 /100 WBC
PLATELET # BLD AUTO: 239 K/UL (ref 150–450)
PMV BLD AUTO: 9.7 FL (ref 9.2–12.9)
POTASSIUM SERPL-SCNC: 3.6 MMOL/L (ref 3.5–5.1)
PROT SERPL-MCNC: 6.9 G/DL (ref 6–8.4)
RBC # BLD AUTO: 6.08 M/UL (ref 4.6–6.2)
SODIUM SERPL-SCNC: 134 MMOL/L (ref 136–145)
VANCOMYCIN TROUGH SERPL-MCNC: 12 UG/ML (ref 10–22)
WBC # BLD AUTO: 13.74 K/UL (ref 3.9–12.7)

## 2024-01-19 PROCEDURE — 25000003 PHARM REV CODE 250: Performed by: ORTHOPAEDIC SURGERY

## 2024-01-19 PROCEDURE — 80053 COMPREHEN METABOLIC PANEL: CPT

## 2024-01-19 PROCEDURE — 86140 C-REACTIVE PROTEIN: CPT

## 2024-01-19 PROCEDURE — 25000003 PHARM REV CODE 250

## 2024-01-19 PROCEDURE — 96366 THER/PROPH/DIAG IV INF ADDON: CPT

## 2024-01-19 PROCEDURE — 36415 COLL VENOUS BLD VENIPUNCTURE: CPT

## 2024-01-19 PROCEDURE — 36415 COLL VENOUS BLD VENIPUNCTURE: CPT | Mod: XB | Performed by: ORTHOPAEDIC SURGERY

## 2024-01-19 PROCEDURE — 80202 ASSAY OF VANCOMYCIN: CPT | Performed by: ORTHOPAEDIC SURGERY

## 2024-01-19 PROCEDURE — 11000001 HC ACUTE MED/SURG PRIVATE ROOM

## 2024-01-19 PROCEDURE — 0H9FXZZ DRAINAGE OF RIGHT HAND SKIN, EXTERNAL APPROACH: ICD-10-PCS | Performed by: ORTHOPAEDIC SURGERY

## 2024-01-19 PROCEDURE — 85025 COMPLETE CBC W/AUTO DIFF WBC: CPT

## 2024-01-19 PROCEDURE — 63600175 PHARM REV CODE 636 W HCPCS

## 2024-01-19 PROCEDURE — 99233 SBSQ HOSP IP/OBS HIGH 50: CPT | Mod: ,,, | Performed by: INTERNAL MEDICINE

## 2024-01-19 PROCEDURE — 63600175 PHARM REV CODE 636 W HCPCS: Performed by: ORTHOPAEDIC SURGERY

## 2024-01-19 RX ADMIN — ACETAMINOPHEN 650 MG: 325 TABLET ORAL at 05:01

## 2024-01-19 RX ADMIN — CELECOXIB 200 MG: 200 CAPSULE ORAL at 08:01

## 2024-01-19 RX ADMIN — LISINOPRIL 20 MG: 20 TABLET ORAL at 09:01

## 2024-01-19 RX ADMIN — VANCOMYCIN HYDROCHLORIDE 2000 MG: 500 INJECTION, POWDER, LYOPHILIZED, FOR SOLUTION INTRAVENOUS at 11:01

## 2024-01-19 RX ADMIN — CEFAZOLIN 2 G: 2 INJECTION, POWDER, FOR SOLUTION INTRAMUSCULAR; INTRAVENOUS at 01:01

## 2024-01-19 RX ADMIN — CEFAZOLIN 2 G: 2 INJECTION, POWDER, FOR SOLUTION INTRAMUSCULAR; INTRAVENOUS at 06:01

## 2024-01-19 RX ADMIN — ACETAMINOPHEN 650 MG: 325 TABLET ORAL at 10:01

## 2024-01-19 RX ADMIN — CEFAZOLIN 2 G: 2 INJECTION, POWDER, FOR SOLUTION INTRAMUSCULAR; INTRAVENOUS at 08:01

## 2024-01-19 RX ADMIN — OXYCODONE HYDROCHLORIDE 5 MG: 5 TABLET ORAL at 05:01

## 2024-01-19 RX ADMIN — OXYCODONE HYDROCHLORIDE 5 MG: 5 TABLET ORAL at 10:01

## 2024-01-19 RX ADMIN — DULOXETINE HYDROCHLORIDE 60 MG: 60 CAPSULE, DELAYED RELEASE ORAL at 09:01

## 2024-01-19 RX ADMIN — METRONIDAZOLE 500 MG: 500 TABLET ORAL at 05:01

## 2024-01-19 RX ADMIN — ALLOPURINOL 200 MG: 100 TABLET ORAL at 09:01

## 2024-01-19 NOTE — HPI
"A 53-year-old man whom two days prior to admission developed swelling and erythema to his right 4th finger. He noted a small pustule and attempted to self treat by using tweezers. Unfortunately, he developed progressively worsening swelling, redness and dark discoloration of his finger and extending over the dorsum of his hand. He sought care in [facility] ED due to pain, swelling and erythema. On evaluation he was afebrile and with leukocytosis. He was evaluated by Orthopedic Surgery and underwent I&D of an abscess. He was admitted and started on empiric cefepime, Flagyl, and vancomycin while awaiting culture results.     Mr. Nava has NKDA. He is a . He denies fresh water and shellfish exposure.     Infectious Diseases consulted for "hand infection"      "

## 2024-01-19 NOTE — SUBJECTIVE & OBJECTIVE
Past Medical History:   Diagnosis Date    Arthritis     Kidney stones        Past Surgical History:   Procedure Laterality Date    CYSTOSCOPY W/ LASER LITHOTRIPSY      VASECTOMY         Review of patient's allergies indicates:  No Known Allergies    Medications:  Medications Prior to Admission   Medication Sig    allopurinoL (ZYLOPRIM) 100 MG tablet Take 200 mg by mouth nightly.    celecoxib (CELEBREX) 200 MG capsule Take 1 capsule (200 mg total) by mouth once daily.    DULoxetine (CYMBALTA) 20 MG capsule Take 1 capsule (20 mg total) by mouth once daily. (Patient taking differently: Take 20 mg by mouth nightly.)    DULoxetine (CYMBALTA) 60 MG capsule Take 1 capsule (60 mg total) by mouth once daily. (Patient taking differently: Take 60 mg by mouth nightly.)    lisinopriL (PRINIVIL,ZESTRIL) 20 MG tablet Take 20 mg by mouth nightly.    metFORMIN (GLUCOPHAGE-XR) 500 MG ER 24hr tablet Take 500 mg by mouth every morning.    multivit-min/ferrous fumarate (MULTI VITAMIN ORAL) Take 1 tablet by mouth once daily.    potassium citrate (UROCIT-K 15) 15 mEq TbSR Take 1 tablet by mouth nightly.     Antibiotics (From admission, onward)      Start     Stop Route Frequency Ordered    01/19/24 0030  cefazolin (ANCEF) 2 gram in dextrose 5% 50 mL IVPB (premix)         -- IV Every 8 hours (non-standard times) 01/18/24 1803    01/18/24 0630  vancomycin 2 g in dextrose 5 % 500 mL IVPB  (vancomycin IVPB (PEDS and ADULTS))         -- IV Every 12 hours (non-standard times) 01/17/24 2329    01/18/24 0100  metroNIDAZOLE tablet 500 mg         -- Oral Every 8 hours 01/17/24 2318          Antifungals (From admission, onward)      None          Antivirals (From admission, onward)      None             Immunization History   Administered Date(s) Administered    COVID-19, MRNA, LN-S, PF (Pfizer) (Purple Cap) 05/27/2021, 06/17/2021    Influenza - Quadrivalent - PF *Preferred* (6 months and older) 10/11/2023    Td - PF (ADULT) 10/11/2023       Family  History       Problem Relation (Age of Onset)    Arthritis Father, Paternal Grandmother    Asthma Daughter    Cancer Father, Maternal Grandmother, Paternal Grandfather    Hearing loss Father, Maternal Uncle    Hypertension Father, Brother, Brother    Stroke Paternal Grandmother          Social History     Socioeconomic History    Marital status:    Tobacco Use    Smoking status: Never     Passive exposure: Never    Smokeless tobacco: Never   Substance and Sexual Activity    Alcohol use: Yes     Comment: Maybe wine at dinner 1x per week, maybe bourbon 1 night p/wk    Drug use: Yes     Frequency: 7.0 times per week     Types: Marijuana     Comment: Sleep aid only for arthritic purposes. Use for 2 months only    Sexual activity: Yes     Partners: Female     Birth control/protection: Post-menopausal     Comment: I have a vasectomy, she is post menopausal     Social Determinants of Health     Financial Resource Strain: Low Risk  (1/18/2024)    Overall Financial Resource Strain (CARDIA)     Difficulty of Paying Living Expenses: Not very hard   Food Insecurity: No Food Insecurity (1/18/2024)    Hunger Vital Sign     Worried About Running Out of Food in the Last Year: Never true     Ran Out of Food in the Last Year: Never true   Transportation Needs: No Transportation Needs (1/18/2024)    PRAPARE - Transportation     Lack of Transportation (Medical): No     Lack of Transportation (Non-Medical): No   Physical Activity: Inactive (1/18/2024)    Exercise Vital Sign     Days of Exercise per Week: 0 days     Minutes of Exercise per Session: 0 min   Stress: Stress Concern Present (1/18/2024)    [employer] - Occupational Stress Questionnaire     Feeling of Stress : To some extent   Social Connections: Socially Isolated (1/18/2024)    Social Connection and Isolation Panel [NHANES]     Frequency of Communication with Friends and Family: More than three times a week     Frequency of Social  Gatherings with Friends and Family: More than three times a week     Attends Jew Services: Never     Active Member of Clubs or Organizations: No     Attends Club or Organization Meetings: Never     Marital Status:    Housing Stability: Unknown (1/18/2024)    Housing Stability Vital Sign     Unable to Pay for Housing in the Last Year: No     Unstable Housing in the Last Year: No     Review of Systems   Constitutional:  Negative for chills, fatigue and fever.   HENT:  Negative for ear pain, mouth sores, nosebleeds, postnasal drip, rhinorrhea, sinus pressure, sore throat, tinnitus, trouble swallowing and voice change.    Eyes:  Negative for photophobia, pain, redness and visual disturbance.   Respiratory:  Negative for apnea, cough, chest tightness, shortness of breath and wheezing.    Cardiovascular:  Negative for chest pain, palpitations and leg swelling.   Gastrointestinal:  Negative for abdominal pain, blood in stool, constipation, diarrhea, nausea and vomiting.   Endocrine: Negative for cold intolerance, heat intolerance, polydipsia and polyuria.   Genitourinary:  Negative for decreased urine volume, difficulty urinating, dysuria, flank pain, frequency, genital sores, hematuria, penile discharge, penile pain, penile swelling, scrotal swelling, testicular pain and urgency.   Musculoskeletal:  Negative for arthralgias, back pain, joint swelling, myalgias and neck pain.   Skin:  Positive for color change and wound. Negative for rash.   Allergic/Immunologic: Negative for environmental allergies and food allergies.   Neurological:  Negative for dizziness, seizures, syncope, weakness, light-headedness, numbness and headaches.   Hematological:  Negative for adenopathy. Does not bruise/bleed easily.   Psychiatric/Behavioral:  Negative for agitation, confusion, decreased concentration, hallucinations, self-injury, sleep disturbance and suicidal ideas. The patient is not nervous/anxious.      Objective:      Vital Signs (Most Recent):  Temp: 98.6 °F (37 °C) (01/18/24 1836)  Pulse: 92 (01/18/24 1836)  Resp: 18 (01/18/24 1836)  BP: (!) 162/85 (01/18/24 1836)  SpO2: 97 % (01/18/24 1836) Vital Signs (24h Range):  Temp:  [97.7 °F (36.5 °C)-98.9 °F (37.2 °C)] 98.6 °F (37 °C)  Pulse:  [75-92] 92  Resp:  [16-20] 18  SpO2:  [95 %-99 %] 97 %  BP: (133-162)/() 162/85     Weight: 135.6 kg (299 lb)  Body mass index is 44.15 kg/m².    Estimated Creatinine Clearance: 142.7 mL/min (based on SCr of 0.8 mg/dL).     Physical Exam  Vitals reviewed.   Constitutional:       Appearance: He is well-developed. He is ill-appearing.      Interventions: He is sedated and intubated.   HENT:      Head: Normocephalic and atraumatic.      Right Ear: External ear normal.      Left Ear: External ear normal.   Eyes:      Conjunctiva/sclera: Conjunctivae normal.      Pupils: Pupils are equal, round, and reactive to light.   Neck:      Thyroid: No thyromegaly.   Cardiovascular:      Rate and Rhythm: Normal rate and regular rhythm.      Heart sounds: Normal heart sounds. No murmur heard.  Pulmonary:      Effort: Pulmonary effort is normal. He is intubated.      Breath sounds: Normal breath sounds. No wheezing or rales.   Abdominal:      General: Bowel sounds are normal.      Palpations: Abdomen is soft. There is no mass.      Tenderness: There is no abdominal tenderness. There is no rebound.   Musculoskeletal:      Cervical back: Normal range of motion and neck supple.      Comments: Right fourth finger wrapped with compression dressing extending into the wrist.   Lymphadenopathy:      Cervical: No cervical adenopathy.   Skin:     General: Skin is warm and dry.   Neurological:      Mental Status: He is lethargic.          Significant Labs: Blood Culture:   Recent Labs   Lab 01/17/24  1403   LABBLOO No Growth to date  No Growth to date  No Growth to date  No Growth to date     BMP:   Recent Labs   Lab 01/17/24  1403   GLU 89      K 4.3       CO2 27   BUN 12   CREATININE 0.8   CALCIUM 10.0     CBC:   Recent Labs   Lab 01/17/24  1403   WBC 14.24*   HGB 17.5   HCT 54.4*        Microbiology Results (last 7 days)       Procedure Component Value Units Date/Time    Blood Culture #1 **CANNOT BE ORDERED STAT** [[id number]] Collected: 01/17/24 1403    Order Status: Completed Specimen: Blood from Peripheral, Antecubital, Left Updated: 01/18/24 1612     Blood Culture, Routine No Growth to date      No Growth to date    Blood Culture #2 **CANNOT BE ORDERED STAT** [[id number]] Collected: 01/17/24 1403    Order Status: Completed Specimen: Blood from Peripheral, Antecubital, Right Updated: 01/18/24 1612     Blood Culture, Routine No Growth to date      No Growth to date    AFB Culture & Smear [[id number]] Collected: 01/17/24 1604    Order Status: Completed Specimen: Abscess from Finger, Right Hand Updated: 01/18/24 1415     AFB CULTURE STAIN No acid fast bacilli seen.    Narrative:      Right Ring Finger    Aerobic culture [[id number]]  (Abnormal) Collected: 01/17/24 1604    Order Status: Completed Specimen: Abscess from Finger, Right Hand Updated: 01/18/24 1324     Aerobic Bacterial Culture STAPHYLOCOCCUS AUREUS  Many  Susceptibility pending      Narrative:      Right Ring Finger    Culture, Anaerobic [[id number]] Collected: 01/17/24 1604    Order Status: Completed Specimen: Abscess from Finger, Right Hand Updated: 01/18/24 0718     Anaerobic Culture Culture in progress    Narrative:      Right Ring Finger    Gram stain [[id number]] Collected: 01/17/24 1604    Order Status: Completed Specimen: Abscess from Finger, Right Hand Updated: 01/17/24 1828     Gram Stain Result Rare WBC's      No organisms seen    Narrative:      Right Ring Finger    Fungus culture [[id number]] Collected: 01/17/24 1604    Order Status: Sent Specimen: Abscess from Finger, Right Hand Updated: 01/17/24 1737            Significant Imaging: I have reviewed all pertinent imaging  results/findings within the past 24 hours.

## 2024-01-19 NOTE — SUBJECTIVE & OBJECTIVE
"Interval History: "OK. Still swollen". No acute overnight events. Leukocytosis improving. Wound culture with S aureus pending susceptibilities.    Review of Systems   Constitutional:  Negative for chills, fatigue and fever.   HENT:  Negative for ear pain, mouth sores, nosebleeds, postnasal drip, rhinorrhea, sinus pressure, sore throat, tinnitus, trouble swallowing and voice change.    Eyes:  Negative for photophobia, pain, redness and visual disturbance.   Respiratory:  Negative for apnea, cough, chest tightness, shortness of breath and wheezing.    Cardiovascular:  Negative for chest pain, palpitations and leg swelling.   Gastrointestinal:  Negative for abdominal pain, blood in stool, constipation, diarrhea, nausea and vomiting.   Endocrine: Negative for cold intolerance, heat intolerance, polydipsia and polyuria.   Genitourinary:  Negative for decreased urine volume, difficulty urinating, dysuria, flank pain, frequency, genital sores, hematuria, penile discharge, penile pain, penile swelling, scrotal swelling, testicular pain and urgency.   Musculoskeletal:  Negative for arthralgias, back pain, joint swelling, myalgias and neck pain.   Skin:  Positive for color change and wound. Negative for rash.   Allergic/Immunologic: Negative for environmental allergies and food allergies.   Neurological:  Negative for dizziness, seizures, syncope, weakness, light-headedness, numbness and headaches.   Hematological:  Negative for adenopathy. Does not bruise/bleed easily.   Psychiatric/Behavioral:  Negative for agitation, confusion, decreased concentration, hallucinations, self-injury, sleep disturbance and suicidal ideas. The patient is not nervous/anxious.      Objective:     Vital Signs (Most Recent):  Temp: 98.6 °F (37 °C) (01/19/24 0700)  Pulse: 75 (01/19/24 0700)  Resp: 17 (01/19/24 0700)  BP: 133/83 (01/19/24 0700)  SpO2: (!) 94 % (01/19/24 0700) Vital Signs (24h Range):  Temp:  [97.7 °F (36.5 °C)-98.6 °F (37 °C)] 98.6 " °F (37 °C)  Pulse:  [75-92] 75  Resp:  [16-20] 17  SpO2:  [94 %-99 %] 94 %  BP: (129-162)/() 133/83     Weight: (!) 138.7 kg (305 lb 11.2 oz)  Body mass index is 45.14 kg/m².    Estimated Creatinine Clearance: 192.6 mL/min (based on SCr of 0.6 mg/dL).     Physical Exam  Vitals reviewed.   Constitutional:       Appearance: He is well-developed. He is ill-appearing.      Interventions: He is sedated and intubated.   HENT:      Head: Normocephalic and atraumatic.      Right Ear: External ear normal.      Left Ear: External ear normal.   Eyes:      Conjunctiva/sclera: Conjunctivae normal.      Pupils: Pupils are equal, round, and reactive to light.   Neck:      Thyroid: No thyromegaly.   Cardiovascular:      Rate and Rhythm: Normal rate and regular rhythm.      Heart sounds: Normal heart sounds. No murmur heard.  Pulmonary:      Effort: Pulmonary effort is normal. He is intubated.      Breath sounds: Normal breath sounds. No wheezing or rales.   Abdominal:      General: Bowel sounds are normal.      Palpations: Abdomen is soft. There is no mass.      Tenderness: There is no abdominal tenderness. There is no rebound.   Musculoskeletal:      Cervical back: Normal range of motion and neck supple.      Comments: Right fourth finger with dark erythema and maroon discoloration. Open wound without purulence noted. Erythema of the dorsum of the hand improving.    Lymphadenopathy:      Cervical: No cervical adenopathy.   Skin:     General: Skin is warm and dry.   Neurological:      Mental Status: He is lethargic.          Significant Labs: BMP:   Recent Labs   Lab 01/19/24  0437      *   K 3.6      CO2 21*   BUN 12   CREATININE 0.6   CALCIUM 9.5     CBC:   Recent Labs   Lab 01/17/24  1403 01/19/24  0437   WBC 14.24* 13.74*   HGB 17.5 16.8   HCT 54.4* 52.2    239     Microbiology Results (last 7 days)       Procedure Component Value Units Date/Time    Culture, Anaerobic [[id number]] Collected:  01/17/24 1604    Order Status: Completed Specimen: Abscess from Finger, Right Hand Updated: 01/19/24 1057     Anaerobic Culture Culture in progress    Narrative:      Right Ring Finger    Aerobic culture [[id number]]  (Abnormal) Collected: 01/17/24 1604    Order Status: Completed Specimen: Abscess from Finger, Right Hand Updated: 01/19/24 1043     Aerobic Bacterial Culture STAPHYLOCOCCUS AUREUS  Many  Susceptibility pending      Narrative:      Right Ring Finger    AFB Culture & Smear [[id number]] Collected: 01/17/24 1604    Order Status: Completed Specimen: Abscess from Finger, Right Hand Updated: 01/18/24 2128     AFB Culture & Smear Culture in progress     AFB CULTURE STAIN No acid fast bacilli seen.    Narrative:      Right Ring Finger    Blood Culture #1 **CANNOT BE ORDERED STAT** [[id number]] Collected: 01/17/24 1403    Order Status: Completed Specimen: Blood from Peripheral, Antecubital, Left Updated: 01/18/24 1612     Blood Culture, Routine No Growth to date      No Growth to date    Blood Culture #2 **CANNOT BE ORDERED STAT** [[id number]] Collected: 01/17/24 1403    Order Status: Completed Specimen: Blood from Peripheral, Antecubital, Right Updated: 01/18/24 1612     Blood Culture, Routine No Growth to date      No Growth to date    Gram stain [[id number]] Collected: 01/17/24 1604    Order Status: Completed Specimen: Abscess from Finger, Right Hand Updated: 01/17/24 1828     Gram Stain Result Rare WBC's      No organisms seen    Narrative:      Right Ring Finger    Fungus culture [[id number]] Collected: 01/17/24 1604    Order Status: Sent Specimen: Abscess from Finger, Right Hand Updated: 01/17/24 1737            Significant Imaging: I have reviewed all pertinent imaging results/findings within the past 24 hours.

## 2024-01-19 NOTE — ASSESSMENT & PLAN NOTE
I have reviewed hospital notes from  Orthopedic Surgery service and other specialty providers. I have also reviewed CBC, CMP/BMP,  cultures and imaging with my interpretation as documented.     Cellulitis and abscess of the right fourth finger extending into the hand and wrist. Concern for Staphylococcal infection in the setting of purulent cellulitis despite negative gram stain. Post rounds cultures with S aureus pending susceptibilities. Patient reporting worsening swelling and dark discoloration of the finger this afternoon. Leukocytosis slightly down trending. CRP increase noted.  Continue cefazolin 2 gm every 8 hours.  Continue vancomycin.  PharmD vancomycin monitoring protocol.   Discontinue Flagyl.  Can transition to Bactrim empirically for discharge if no contraindication today.  I will follow up culture results and optimize antibiotics as needed.   Will get routine lab work in one week if discharged on Bactrim.  Monitor closely for progression to tenosynovitis and/or necrosis of the soft tissues as patient reports worsening symptoms this afternoon.  Discussed management plan with the staff and/or members from Orthopedic Surgery service.

## 2024-01-19 NOTE — PROGRESS NOTES
"[facility] Surg  Orthopedics  Progress Note    Patient Name: Chris Nava  MRN: [id number]  Admission Date: 1/17/2024  Hospital Length of Stay: 1 days  Attending Provider: Gayle Ross MD  Primary Care Provider: Shira Rodríguez NP    Subjective:     Principal Problem:Cellulitis of finger of right hand    Principal Orthopedic Problem: as above    Interval History: Afebrile, VSS, NAEON.  Labs this morning with WBC 13.74 .4 from 35.  States pain is improved this morning.  Swelling is notably increased along dorsum of P2/P3.  Preliminary  cultures positive for staph aureus, with ID recommending ancef/vanc.      Review of patient's allergies indicates:  No Known Allergies    Current Facility-Administered Medications   Medication    acetaminophen tablet 650 mg    allopurinoL tablet 200 mg    ceFAZolin 2 g in dextrose 5 % in water (D5W) 50 mL IVPB (MB+)    celecoxib capsule 200 mg    DULoxetine DR capsule 60 mg    lisinopriL tablet 20 mg    metroNIDAZOLE tablet 500 mg    oxyCODONE immediate release tablet 5 mg    vancomycin 2 g in dextrose 5 % 500 mL IVPB     Objective:     Vital Signs (Most Recent):  Temp: 98.6 °F (37 °C) (01/19/24 0431)  Pulse: 80 (01/19/24 0431)  Resp: 20 (01/19/24 0503)  BP: 130/80 (01/19/24 0431)  SpO2: 97 % (01/19/24 0431) Vital Signs (24h Range):  Temp:  [97.7 °F (36.5 °C)-98.9 °F (37.2 °C)] 98.6 °F (37 °C)  Pulse:  [75-92] 80  Resp:  [16-20] 20  SpO2:  [95 %-99 %] 97 %  BP: (129-162)/() 130/80     Weight: (!) 138.7 kg (305 lb 11.2 oz)  Height: 5' 9" (175.3 cm)  Body mass index is 45.14 kg/m².      Intake/Output Summary (Last 24 hours) at 1/19/2024 0618  Last data filed at 1/18/2024 2009  Gross per 24 hour   Intake 200 ml   Output --   Net 200 ml         Ortho/SPM Exam  AAOx4  NAD  Reg rate  No increased WOB    RUE:  Dressing c/d/i; removed   Underlying I&D site now with expressible purulence  SILT   Painless active and passive ROM at Dip/Pip/Mcp joints   WWP " extremities      Significant Labs: All pertinent labs within the past 24 hours have been reviewed.    Significant Imaging: I have reviewed and interpreted all pertinent imaging results/findings.  Assessment/Plan:     * Cellulitis of finger of right hand  Chris Nava is a 53 y.o. male who presents with 2 days of worsening right ring finger swelling and pain.  Patient presented afebrile with labs showing WBC 13.74, ESR  , .4.  On exam, patient had a erythematous fluctuance at the dorsum of the right ring finger P2 with proximal extension to the dorsum of fourth mcp joint.  0/4 kanavel signs, and patient had no other musculoskeletal pains on physical exam.  Bedside I&D was performed, and cultures were obtained.      - Cultures positive for staph aureus  - Abx: Per ID, currently vanc/cefepime   - Pain: multimodal   - Labs: Continue trending CRP: 120 from 35     » Dispo: continue IV abx        CONCHITA Krause MD  Orthopedics  [facility] Surg

## 2024-01-19 NOTE — ASSESSMENT & PLAN NOTE
I have reviewed hospital notes from  Orthopedic Surgery service and other specialty providers. I have also reviewed CBC, CMP/BMP,  cultures and imaging with my interpretation as documented.     Cellulitis and abscess of the right fourth finger extending into the hand and wrist. Concern for Staphylococcal infection in the setting of purulent cellulitis despite negative gram stain. Post rounds cultures with S aureus pending susceptibilities. Patient reporting worsening swelling and dark discoloration of the finger this afternoon.   Discontinue cefepime.  Start cefazolin 2 gm every 8 hours.  Continue vancomycin.  PharmD vancomycin monitoring protocol.   Repeat CBC tomorrow to ensure leukocytosis is resolving.   Monitor closely for progression to tenosynovitis and/or necrosis of the soft tissues as patient reports worsening symptoms this afternoon.  Discussed management plan with the staff and/or members from Orthopedic Surgery service.

## 2024-01-19 NOTE — PROGRESS NOTES
Pharmacokinetic Assessment Follow Up: IV Vancomycin    Vancomycin serum concentration assessment(s):    The trough level was drawn correctly and can be used to guide therapy at this time. The measurement is within the desired definitive target range of 10 to 20 mcg/mL.    Vancomycin Regimen Plan:    Will plan to CCR of vancomycin 2 g IV q12h  Considering the improvement in the WBC will target a trough of 10-20 for now however, if the cultures result w/ MRSA will be more aggressive and target 15-20  Anticipate the patients vancomycin trough to increase further with continued dosing  Plan for follow up VT Sunday 1/21 at 0930    Drug levels (last 3 results):  Recent Labs   Lab Result Units 01/19/24  0940   Vancomycin-Trough ug/mL 12.0     Pharmacy will continue to follow and monitor vancomycin.    Please contact pharmacy at extension [phone] for questions regarding this assessment.    Thank you for the consult,   Dano Merino, PharmD, [facility]      Patient brief summary:  Chris Nava is a 53 y.o. male initiated on antimicrobial therapy with IV Vancomycin for treatment of skin & soft tissue infection    Drug Allergies:   Review of patient's allergies indicates:  No Known Allergies    Actual Body Weight:   138.7 kg    Renal Function:   Estimated Creatinine Clearance: 192.6 mL/min (based on SCr of 0.6 mg/dL).,     Dialysis Method (if applicable):  N/A    CBC (last 72 hours):  Recent Labs   Lab Result Units 01/17/24  1403 01/19/24  0437   WBC K/uL 14.24* 13.74*   Hemoglobin g/dL 17.5 16.8   Hematocrit % 54.4* 52.2   Platelets K/uL 245 239   Gran % % 75.0* 73.4*   Lymph % % 13.1* 12.9*   Mono % % 9.5 10.2   Eosinophil % % 1.6 2.5   Basophil % % 0.4 0.6   Differential Method  Automated Automated       Metabolic Panel (last 72 hours):  Recent Labs   Lab Result Units 01/17/24  1403 01/19/24  0437   Sodium mmol/L 137 134*   Potassium mmol/L 4.3 3.6   Chloride mmol/L 101 105   CO2 mmol/L 27 21*   Glucose mg/dL 89 102    BUN mg/dL 12 12   Creatinine mg/dL 0.8 0.6   Albumin g/dL 4.1 3.5   Total Bilirubin mg/dL 1.1* 1.1*   Alkaline Phosphatase U/L 95 95   AST U/L 25 20   ALT U/L 34 26       Vancomycin Administrations:  vancomycin given in the last 96 hours                     vancomycin 2 g in dextrose 5 % 500 mL IVPB (mg) 2,000 mg New Bag 01/18/24 2237     2,000 mg New Bag  0614    vancomycin (VANCOCIN) 2,500 mg in dextrose 5 % (D5W) 500 mL IVPB (mg) 2,500 mg New Bag 01/17/24 1419                    Microbiologic Results:  Microbiology Results (last 7 days)       Procedure Component Value Units Date/Time    Aerobic culture [[id number]]  (Abnormal) Collected: 01/17/24 1604    Order Status: Completed Specimen: Abscess from Finger, Right Hand Updated: 01/19/24 1043     Aerobic Bacterial Culture STAPHYLOCOCCUS AUREUS  Many  Susceptibility pending      Narrative:      Right Ring Finger    AFB Culture & Smear [[id number]] Collected: 01/17/24 1604    Order Status: Completed Specimen: Abscess from Finger, Right Hand Updated: 01/18/24 2128     AFB Culture & Smear Culture in progress     AFB CULTURE STAIN No acid fast bacilli seen.    Narrative:      Right Ring Finger    Blood Culture #1 **CANNOT BE ORDERED STAT** [[id number]] Collected: 01/17/24 1403    Order Status: Completed Specimen: Blood from Peripheral, Antecubital, Left Updated: 01/18/24 1612     Blood Culture, Routine No Growth to date      No Growth to date    Blood Culture #2 **CANNOT BE ORDERED STAT** [[id number]] Collected: 01/17/24 1403    Order Status: Completed Specimen: Blood from Peripheral, Antecubital, Right Updated: 01/18/24 1612     Blood Culture, Routine No Growth to date      No Growth to date    Culture, Anaerobic [[id number]] Collected: 01/17/24 1604    Order Status: Completed Specimen: Abscess from Finger, Right Hand Updated: 01/18/24 0718     Anaerobic Culture Culture in progress    Narrative:      Right Ring Finger    Gram stain [[id number]] Collected: 01/17/24  1609    Order Status: Completed Specimen: Abscess from Finger, Right Hand Updated: 01/17/24 1820     Gram Stain Result Rare WBC's      No organisms seen    Narrative:      Right Ring Finger    Fungus culture [[id number]] Collected: 01/17/24 1606    Order Status: Sent Specimen: Abscess from Finger, Right Hand Updated: 01/17/24 5402

## 2024-01-19 NOTE — ASSESSMENT & PLAN NOTE
Chris Nava is a 53 y.o. male who presents with 2 days of worsening right ring finger swelling and pain.  Patient presented afebrile with labs showing WBC 13.74, ESR  , .4.  On exam, patient had a erythematous fluctuance at the dorsum of the right ring finger P2 with proximal extension to the dorsum of fourth mcp joint.  0/4 kanavel signs, and patient had no other musculoskeletal pains on physical exam.  Bedside I&D was performed, and cultures were obtained.      - Cultures positive for staph aureus  - Abx: Per ID, currently vanc/cefepime   - Pain: multimodal   - Labs: Continue trending CRP: 120 from 35     » Dispo: continue IV abx

## 2024-01-19 NOTE — ASSESSMENT & PLAN NOTE
Body mass index is 45.14 kg/m². Morbid obesity complicates all aspects of disease management from diagnostic modalities to treatment. Weight loss encouraged and health benefits explained to patient.

## 2024-01-19 NOTE — SUBJECTIVE & OBJECTIVE
"Principal Problem:Cellulitis of finger of right hand    Principal Orthopedic Problem: as above    Interval History: Afebrile, VSS, NAEON.  Labs this morning with WBC 13.74 .4 from 35.  States pain is improved this morning.  Swelling is notably increased along dorsum of P2/P3.  Preliminary  cultures positive for staph aureus, with ID recommending ancef/vanc.      Review of patient's allergies indicates:  No Known Allergies    Current Facility-Administered Medications   Medication    acetaminophen tablet 650 mg    allopurinoL tablet 200 mg    ceFAZolin 2 g in dextrose 5 % in water (D5W) 50 mL IVPB (MB+)    celecoxib capsule 200 mg    DULoxetine DR capsule 60 mg    lisinopriL tablet 20 mg    metroNIDAZOLE tablet 500 mg    oxyCODONE immediate release tablet 5 mg    vancomycin 2 g in dextrose 5 % 500 mL IVPB     Objective:     Vital Signs (Most Recent):  Temp: 98.6 °F (37 °C) (01/19/24 0431)  Pulse: 80 (01/19/24 0431)  Resp: 20 (01/19/24 0503)  BP: 130/80 (01/19/24 0431)  SpO2: 97 % (01/19/24 0431) Vital Signs (24h Range):  Temp:  [97.7 °F (36.5 °C)-98.9 °F (37.2 °C)] 98.6 °F (37 °C)  Pulse:  [75-92] 80  Resp:  [16-20] 20  SpO2:  [95 %-99 %] 97 %  BP: (129-162)/() 130/80     Weight: (!) 138.7 kg (305 lb 11.2 oz)  Height: 5' 9" (175.3 cm)  Body mass index is 45.14 kg/m².      Intake/Output Summary (Last 24 hours) at 1/19/2024 0618  Last data filed at 1/18/2024 2009  Gross per 24 hour   Intake 200 ml   Output --   Net 200 ml          Ortho/SPM Exam  AAOx4  NAD  Reg rate  No increased WOB    RUE:  Dressing c/d/i; removed   Underlying I&D site now with expressible purulence  SILT   Painless active and passive ROM at Dip/Pip/Mcp joints   WWP extremities      Significant Labs: All pertinent labs within the past 24 hours have been reviewed.    Significant Imaging: I have reviewed and interpreted all pertinent imaging results/findings.  "

## 2024-01-19 NOTE — NURSING
Nurses Note -- 4 Eyes      1/18/2024   6:42 PM      Skin assessed during: Admit      [] No Altered Skin Integrity Present    []Prevention Measures Documented      [x] Yes- Altered Skin Integrity Present or Discovered   [x] LDA Added if Not in Epic (Describe Wound)   [x] New Altered Skin Integrity was Present on Admit and Documented in LDA   [x] Wound Image Taken    Wound Care Consulted? Yes    Attending Nurse:  GLEN Jensen    Second RN/Staff Member:   GLEN Meneses

## 2024-01-19 NOTE — PLAN OF CARE
Infectious Disease Note      Chart has been reviewed.  Patient seen and examined this morning. Cultures now showing S aureus.     Recommendations:  Stop cefepime.  Start cefazolin.  Continue vancomycin.  Monitor closely for tissue necrosis.  Full consultation note to follow.    Infectious Diseases will follow up. Please call if questions arise.  Bindu Dumont MD, [facility]  Infectious Diseases

## 2024-01-19 NOTE — ED NOTES
The patient states he would prefer to not be in hospital gown and would like to remain in his normal clothes

## 2024-01-19 NOTE — ASSESSMENT & PLAN NOTE
S/P I&D on 1/17. Cultures with S aureus.  Continue antibiotics as in cellulitis.  Will need to monitor response over the next 24 hours to ensure clinical improvement prior to discharge.

## 2024-01-19 NOTE — PROGRESS NOTES
[facility] Med S[facility]  Infectious Disease  Progress Note    Patient Name: Chris Nava  MRN: [id number]  Admission Date: 1/17/2024  Length of Stay: 1 days  Attending Physician: Gayle Ross MD  Primary Care Provider: Shira Rodríguez NP    Isolation Status: No active isolations  Assessment/Plan:      ID  * Cellulitis of finger of right hand  I have reviewed hospital notes from  Orthopedic Surgery service and other specialty providers. I have also reviewed CBC, CMP/BMP,  cultures and imaging with my interpretation as documented.     Cellulitis and abscess of the right fourth finger extending into the hand and wrist. Concern for Staphylococcal infection in the setting of purulent cellulitis despite negative gram stain. Post rounds cultures with S aureus pending susceptibilities. Patient reporting worsening swelling and dark discoloration of the finger this afternoon. Leukocytosis slightly down trending. CRP increase noted.  Continue cefazolin 2 gm every 8 hours.  Continue vancomycin.  PharmD vancomycin monitoring protocol.   Discontinue Flagyl.  Can transition to Bactrim empirically for discharge if no contraindication today.  I will follow up culture results and optimize antibiotics as needed.   Will get routine lab work in one week if discharged on Bactrim.  Monitor closely for progression to tenosynovitis and/or necrosis of the soft tissues as patient reports worsening symptoms this afternoon.  Discussed management plan with the staff and/or members from Orthopedic Surgery service.      Abscess of finger of right hand  S/P I&D on 1/17. Cultures with S aureus.  Continue antibiotics as in cellulitis.  Will need to monitor response over the next 24 hours to ensure clinical improvement prior to discharge.         Anticipated Disposition: per primary    Thank you for your consult. I will follow-up with patient. Please contact us if you have any additional questions.    Bindu Dumont MD  Infectious  "Disease  [facility]    50 minutes of total time spent on the encounter, which includes face to face time and non-face to face time preparing to see the patient (eg, review of tests), obtaining and/or reviewing separately obtained history, documenting clinical information in the electronic or other health record, independently interpreting results (not separately reported) and communicating results to the patient/family/caregiver, or care coordination (not separately reported).     Subjective:     Principal Problem:Cellulitis of finger of right hand    HPI: A 53-year-old man whom two days prior to admission developed swelling and erythema to his right 4th finger. He noted a small pustule and attempted to self treat by using tweezers. Unfortunately, he developed progressively worsening swelling, redness and dark discoloration of his finger and extending over the dorsum of his hand. He sought care in [facility] ED due to pain, swelling and erythema. On evaluation he was afebrile and with leukocytosis. He was evaluated by Orthopedic Surgery and underwent I&D of an abscess. He was admitted and started on empiric cefepime, Flagyl, and vancomycin while awaiting culture results.     Mr. Nava has NKDA. He is a . He denies fresh water and shellfish exposure.     Infectious Diseases consulted for "hand infection"      Interval History: "OK. Still swollen". No acute overnight events. Leukocytosis improving. Wound culture with S aureus pending susceptibilities.    Review of Systems   Constitutional:  Negative for chills, fatigue and fever.   HENT:  Negative for ear pain, mouth sores, nosebleeds, postnasal drip, rhinorrhea, sinus pressure, sore throat, tinnitus, trouble swallowing and voice change.    Eyes:  Negative for photophobia, pain, redness and visual disturbance.   Respiratory:  Negative for apnea, cough, chest tightness, shortness of breath and wheezing.    Cardiovascular:  Negative for chest pain, " palpitations and leg swelling.   Gastrointestinal:  Negative for abdominal pain, blood in stool, constipation, diarrhea, nausea and vomiting.   Endocrine: Negative for cold intolerance, heat intolerance, polydipsia and polyuria.   Genitourinary:  Negative for decreased urine volume, difficulty urinating, dysuria, flank pain, frequency, genital sores, hematuria, penile discharge, penile pain, penile swelling, scrotal swelling, testicular pain and urgency.   Musculoskeletal:  Negative for arthralgias, back pain, joint swelling, myalgias and neck pain.   Skin:  Positive for color change and wound. Negative for rash.   Allergic/Immunologic: Negative for environmental allergies and food allergies.   Neurological:  Negative for dizziness, seizures, syncope, weakness, light-headedness, numbness and headaches.   Hematological:  Negative for adenopathy. Does not bruise/bleed easily.   Psychiatric/Behavioral:  Negative for agitation, confusion, decreased concentration, hallucinations, self-injury, sleep disturbance and suicidal ideas. The patient is not nervous/anxious.      Objective:     Vital Signs (Most Recent):  Temp: 98.6 °F (37 °C) (01/19/24 0700)  Pulse: 75 (01/19/24 0700)  Resp: 17 (01/19/24 0700)  BP: 133/83 (01/19/24 0700)  SpO2: (!) 94 % (01/19/24 0700) Vital Signs (24h Range):  Temp:  [97.7 °F (36.5 °C)-98.6 °F (37 °C)] 98.6 °F (37 °C)  Pulse:  [75-92] 75  Resp:  [16-20] 17  SpO2:  [94 %-99 %] 94 %  BP: (129-162)/() 133/83     Weight: (!) 138.7 kg (305 lb 11.2 oz)  Body mass index is 45.14 kg/m².    Estimated Creatinine Clearance: 192.6 mL/min (based on SCr of 0.6 mg/dL).     Physical Exam  Vitals reviewed.   Constitutional:       Appearance: He is well-developed. He is ill-appearing.      Interventions: He is sedated and intubated.   HENT:      Head: Normocephalic and atraumatic.      Right Ear: External ear normal.      Left Ear: External ear normal.   Eyes:      Conjunctiva/sclera: Conjunctivae normal.       Pupils: Pupils are equal, round, and reactive to light.   Neck:      Thyroid: No thyromegaly.   Cardiovascular:      Rate and Rhythm: Normal rate and regular rhythm.      Heart sounds: Normal heart sounds. No murmur heard.  Pulmonary:      Effort: Pulmonary effort is normal. He is intubated.      Breath sounds: Normal breath sounds. No wheezing or rales.   Abdominal:      General: Bowel sounds are normal.      Palpations: Abdomen is soft. There is no mass.      Tenderness: There is no abdominal tenderness. There is no rebound.   Musculoskeletal:      Cervical back: Normal range of motion and neck supple.      Comments: Right fourth finger with dark erythema and maroon discoloration. Open wound without purulence noted. Erythema of the dorsum of the hand improving.    Lymphadenopathy:      Cervical: No cervical adenopathy.   Skin:     General: Skin is warm and dry.   Neurological:      Mental Status: He is lethargic.          Significant Labs: BMP:   Recent Labs   Lab 01/19/24  0437      *   K 3.6      CO2 21*   BUN 12   CREATININE 0.6   CALCIUM 9.5     CBC:   Recent Labs   Lab 01/17/24  1403 01/19/24  0437   WBC 14.24* 13.74*   HGB 17.5 16.8   HCT 54.4* 52.2    239     Microbiology Results (last 7 days)       Procedure Component Value Units Date/Time    Culture, Anaerobic [[id number]] Collected: 01/17/24 1604    Order Status: Completed Specimen: Abscess from Finger, Right Hand Updated: 01/19/24 1057     Anaerobic Culture Culture in progress    Narrative:      Right Ring Finger    Aerobic culture [[id number]]  (Abnormal) Collected: 01/17/24 1604    Order Status: Completed Specimen: Abscess from Finger, Right Hand Updated: 01/19/24 1043     Aerobic Bacterial Culture STAPHYLOCOCCUS AUREUS  Many  Susceptibility pending      Narrative:      Right Ring Finger    AFB Culture & Smear [[id number]] Collected: 01/17/24 1604    Order Status: Completed Specimen: Abscess from Finger, Right Hand  Updated: 01/18/24 2128     AFB Culture & Smear Culture in progress     AFB CULTURE STAIN No acid fast bacilli seen.    Narrative:      Right Ring Finger    Blood Culture #1 **CANNOT BE ORDERED STAT** [[id number]] Collected: 01/17/24 1403    Order Status: Completed Specimen: Blood from Peripheral, Antecubital, Left Updated: 01/18/24 1612     Blood Culture, Routine No Growth to date      No Growth to date    Blood Culture #2 **CANNOT BE ORDERED STAT** [[id number]] Collected: 01/17/24 1403    Order Status: Completed Specimen: Blood from Peripheral, Antecubital, Right Updated: 01/18/24 1612     Blood Culture, Routine No Growth to date      No Growth to date    Gram stain [[id number]] Collected: 01/17/24 1604    Order Status: Completed Specimen: Abscess from Finger, Right Hand Updated: 01/17/24 1828     Gram Stain Result Rare WBC's      No organisms seen    Narrative:      Right Ring Finger    Fungus culture [[id number]] Collected: 01/17/24 1604    Order Status: Sent Specimen: Abscess from Finger, Right Hand Updated: 01/17/24 1737            Significant Imaging: I have reviewed all pertinent imaging results/findings within the past 24 hours.

## 2024-01-20 PROCEDURE — 63600175 PHARM REV CODE 636 W HCPCS: Performed by: ORTHOPAEDIC SURGERY

## 2024-01-20 PROCEDURE — 25000003 PHARM REV CODE 250

## 2024-01-20 PROCEDURE — 99233 SBSQ HOSP IP/OBS HIGH 50: CPT | Mod: ,,, | Performed by: INTERNAL MEDICINE

## 2024-01-20 PROCEDURE — 11000001 HC ACUTE MED/SURG PRIVATE ROOM

## 2024-01-20 PROCEDURE — 25000003 PHARM REV CODE 250: Performed by: ORTHOPAEDIC SURGERY

## 2024-01-20 RX ADMIN — ACETAMINOPHEN 650 MG: 325 TABLET ORAL at 05:01

## 2024-01-20 RX ADMIN — CEFAZOLIN 2 G: 2 INJECTION, POWDER, FOR SOLUTION INTRAMUSCULAR; INTRAVENOUS at 01:01

## 2024-01-20 RX ADMIN — ALLOPURINOL 200 MG: 100 TABLET ORAL at 08:01

## 2024-01-20 RX ADMIN — DULOXETINE HYDROCHLORIDE 60 MG: 60 CAPSULE, DELAYED RELEASE ORAL at 08:01

## 2024-01-20 RX ADMIN — ACETAMINOPHEN 650 MG: 325 TABLET ORAL at 06:01

## 2024-01-20 RX ADMIN — CEFAZOLIN 2 G: 2 INJECTION, POWDER, FOR SOLUTION INTRAMUSCULAR; INTRAVENOUS at 10:01

## 2024-01-20 RX ADMIN — CEFAZOLIN 2 G: 2 INJECTION, POWDER, FOR SOLUTION INTRAMUSCULAR; INTRAVENOUS at 05:01

## 2024-01-20 RX ADMIN — OXYCODONE HYDROCHLORIDE 5 MG: 5 TABLET ORAL at 09:01

## 2024-01-20 RX ADMIN — ACETAMINOPHEN 325 MG: 325 TABLET ORAL at 10:01

## 2024-01-20 RX ADMIN — CELECOXIB 200 MG: 200 CAPSULE ORAL at 10:01

## 2024-01-20 RX ADMIN — OXYCODONE HYDROCHLORIDE 5 MG: 5 TABLET ORAL at 05:01

## 2024-01-20 RX ADMIN — OXYCODONE HYDROCHLORIDE 5 MG: 5 TABLET ORAL at 10:01

## 2024-01-20 RX ADMIN — OXYCODONE HYDROCHLORIDE 5 MG: 5 TABLET ORAL at 03:01

## 2024-01-20 RX ADMIN — LISINOPRIL 20 MG: 20 TABLET ORAL at 08:01

## 2024-01-20 NOTE — PLAN OF CARE
Problem: Adult Inpatient Plan of Care  Goal: Plan of Care Review  Outcome: Ongoing, Progressing  Goal: Patient-Specific Goal (Individualized)  Outcome: Ongoing, Progressing  Goal: Absence of Hospital-Acquired Illness or Injury  Outcome: Ongoing, Progressing  Goal: Optimal Comfort and Wellbeing  Outcome: Ongoing, Progressing  Goal: Readiness for Transition of Care  Outcome: Ongoing, Progressing     Problem: Impaired Wound Healing  Goal: Optimal Wound Healing  Outcome: Ongoing, Progressing   Alert orient times 4. Recieved Iv antibiotic this shift. Complain twice of pain charmaine Oxy was given both times,. No fall this shift

## 2024-01-20 NOTE — ASSESSMENT & PLAN NOTE
Chris Nava is a 53 y.o. male who presents with 2 days of worsening right ring finger swelling and pain.  Patient presented afebrile with labs showing WBC  , ESR  , CRP  .  On exam, patient had a erythematous fluctuance at the dorsum of the right ring finger P2 with proximal extension to the dorsum of fourth mcp joint.  0/4 kanavel signs, and patient had no other musculoskeletal pains on physical exam.  Bedside I&D was performed, and cultures were obtained.      - Cultures positive for staph aureus  - Abx: Per ID, currently vanc/cefepime   - plan for DC with 2 weeks Bactrim  - Pain: multimodal   - Betadine soaks b.i.d.    » Dispo: repeat CRP tomorrow w/ possible dc pending improvement

## 2024-01-20 NOTE — PROGRESS NOTES
Therapy with vancomycin complete and/or consult discontinued by provider.  Pharmacy will sign off, please re-consult as needed.    Camila Quinteros, RoxannaD

## 2024-01-20 NOTE — CARE UPDATE
Repeat I&D of right ring finger performed at bedside with further purulent drainage expressed. Plan for dressing change tomorrow and initiation of BID betadine soaks. Will continue to follow for clinical improvement and repeat CRP . If clinically worsening tomorrow, will consider MRI and possibly formal I&D in OR.     Procedure Note: superficial R ring finger irrigation and debridement  Risks, benefits, and alternatives to treatment was explained to patient at length. Patient verbalized their understanding and gave consent to proceed. Time out was performed and patient name, , site, and procedure were confirmed. 10 cc of 1% lidocaine was used for digital block block. Skin was sterilely prepared with betadine. Sterile field was prepped around the surgical site. 2cm Skin incision was made over R ring finger abscess inoculation and was extended over site of prior I&D with scalpel. Incision was taken down bluntly with a hemostat to fluid collection. Pus was expressed. Incision was irrigated with 1 L of saline. Soft dressings were applied. Patient encouraged to keep extremity elevated. Patient tolerated the procedure well. Blood loss was minimal.

## 2024-01-20 NOTE — SUBJECTIVE & OBJECTIVE
"Principal Problem:Cellulitis of finger of right hand    Principal Orthopedic Problem: as above    Interval History: Afebrile, VSS, NAEON.  Bedside I&D performed yesterday.  Patient reports pain has been well-controlled.  Plan to start Betadine soaks b.i.d. today.    Review of patient's allergies indicates:  No Known Allergies    Current Facility-Administered Medications   Medication    acetaminophen tablet 650 mg    allopurinoL tablet 200 mg    ceFAZolin 2 g in dextrose 5 % in water (D5W) 50 mL IVPB (MB+)    celecoxib capsule 200 mg    DULoxetine DR capsule 60 mg    lisinopriL tablet 20 mg    oxyCODONE immediate release tablet 5 mg    vancomycin 2 g in dextrose 5 % 500 mL IVPB     Objective:     Vital Signs (Most Recent):  Temp: 97.6 °F (36.4 °C) (01/20/24 0452)  Pulse: 81 (01/20/24 0452)  Resp: 18 (01/20/24 0452)  BP: 130/85 (01/20/24 0452)  SpO2: 97 % (01/20/24 0452) Vital Signs (24h Range):  Temp:  [97.6 °F (36.4 °C)-98.8 °F (37.1 °C)] 97.6 °F (36.4 °C)  Pulse:  [75-81] 81  Resp:  [16-20] 18  SpO2:  [94 %-98 %] 97 %  BP: (117-146)/(72-93) 130/85     Weight: (!) 138.7 kg (305 lb 11.2 oz)  Height: 5' 9" (175.3 cm)  Body mass index is 45.14 kg/m².    No intake or output data in the 24 hours ending 01/20/24 0528       Ortho/SPM Exam  AAOx4  NAD  Reg rate  No increased WOB    RUE:  Dressing c/d/i; removed   Underlying I&D site now with expressible purulence  SILT   Painless active and passive ROM at Dip/Pip/Mcp joints   WWP extremities      Significant Labs: All pertinent labs within the past 24 hours have been reviewed.    Significant Imaging: I have reviewed and interpreted all pertinent imaging results/findings.  "

## 2024-01-20 NOTE — ASSESSMENT & PLAN NOTE
I have reviewed hospital notes from  Orthopedic Surgery service and other specialty providers. I have also reviewed CBC, CMP/BMP,  cultures and imaging with my interpretation as documented.     Cellulitis and abscess of the right fourth finger extending into the hand and wrist. Concern for Staphylococcal infection in the setting of purulent cellulitis despite negative gram stain. Post rounds cultures with S aureus pending susceptibilities. Patient reporting worsening swelling and dark discoloration of the finger this afternoon. Leukocytosis slightly down trending. CRP increase noted.  Continue cefazolin 2 gm every 8 hours.  Discontinue vancomycin.  Monitor closely for progression to tenosynovitis and/or necrosis of the soft tissues as patient reports worsening symptoms this afternoon.  Discussed management plan with the staff and/or members from Orthopedic Surgery service.

## 2024-01-20 NOTE — PROGRESS NOTES
[facility]  Infectious Disease  Progress Note    Patient Name: Chris Nava  MRN: [id number]  Admission Date: 1/17/2024  Length of Stay: 2 days  Attending Physician: Gayle Ross MD  Primary Care Provider: Shira Rodríguez NP    Isolation Status: No active isolations  Assessment/Plan:      ID  * Cellulitis of finger of right hand  I have reviewed hospital notes from  Orthopedic Surgery service and other specialty providers. I have also reviewed CBC, CMP/BMP,  cultures and imaging with my interpretation as documented.     Cellulitis and abscess of the right fourth finger extending into the hand and wrist. Concern for Staphylococcal infection in the setting of purulent cellulitis despite negative gram stain. Post rounds cultures with S aureus pending susceptibilities. Patient reporting worsening swelling and dark discoloration of the finger this afternoon. Leukocytosis slightly down trending. CRP increase noted.  Continue cefazolin 2 gm every 8 hours.  Discontinue vancomycin.  Monitor closely for progression to tenosynovitis and/or necrosis of the soft tissues as patient reports worsening symptoms this afternoon.  Discussed management plan with the staff and/or members from Orthopedic Surgery service.          Anticipated Disposition: per primary    Thank you for your consult. I will follow-up with patient. Please contact us if you have any additional questions.    Bindu Dumont MD  Infectious Disease  [facility]    50 minutes of total time spent on the encounter, which includes face to face time and non-face to face time preparing to see the patient (eg, review of tests), obtaining and/or reviewing separately obtained history, documenting clinical information in the electronic or other health record, independently interpreting results (not separately reported) and communicating results to the patient/family/caregiver, or care coordination (not separately reported).  "    Subjective:     Principal Problem:Cellulitis of finger of right hand    HPI: A 53-year-old man whom two days prior to admission developed swelling and erythema to his right 4th finger. He noted a small pustule and attempted to self treat by using tweezers. Unfortunately, he developed progressively worsening swelling, redness and dark discoloration of his finger and extending over the dorsum of his hand. He sought care in [facility] due to pain, swelling and erythema. On evaluation he was afebrile and with leukocytosis. He was evaluated by Orthopedic Surgery and underwent I&D of an abscess. He was admitted and started on empiric cefepime, Flagyl, and vancomycin while awaiting culture results.     Mr. Nava has NKDA. He is a . He denies fresh water and shellfish exposure.     Infectious Diseases consulted for "hand infection"      Interval History: "OK. Still swollen". No acute overnight events. Leukocytosis improving. Wound culture with MSSA. S/P debridement # 2 on 1/19 with drainage or purulent material.    Review of Systems   Constitutional:  Negative for chills, fatigue and fever.   HENT:  Negative for ear pain, mouth sores, nosebleeds, postnasal drip, rhinorrhea, sinus pressure, sore throat, tinnitus, trouble swallowing and voice change.    Eyes:  Negative for photophobia, pain, redness and visual disturbance.   Respiratory:  Negative for apnea, cough, chest tightness, shortness of breath and wheezing.    Cardiovascular:  Negative for chest pain, palpitations and leg swelling.   Gastrointestinal:  Negative for abdominal pain, blood in stool, constipation, diarrhea, nausea and vomiting.   Endocrine: Negative for cold intolerance, heat intolerance, polydipsia and polyuria.   Genitourinary:  Negative for decreased urine volume, difficulty urinating, dysuria, flank pain, frequency, genital sores, hematuria, penile discharge, penile pain, penile swelling, scrotal swelling, testicular pain and urgency. "   Musculoskeletal:  Negative for arthralgias, back pain, joint swelling, myalgias and neck pain.   Skin:  Positive for color change and wound. Negative for rash.   Allergic/Immunologic: Negative for environmental allergies and food allergies.   Neurological:  Negative for dizziness, seizures, syncope, weakness, light-headedness, numbness and headaches.   Hematological:  Negative for adenopathy. Does not bruise/bleed easily.   Psychiatric/Behavioral:  Negative for agitation, confusion, decreased concentration, hallucinations, self-injury, sleep disturbance and suicidal ideas. The patient is not nervous/anxious.      Objective:     Vital Signs (Most Recent):  Temp: 97.8 °F (36.6 °C) (01/20/24 1117)  Pulse: 82 (01/20/24 1117)  Resp: 18 (01/20/24 1117)  BP: (!) 137/90 (01/20/24 1117)  SpO2: 98 % (01/20/24 1117) Vital Signs (24h Range):  Temp:  [97.6 °F (36.4 °C)-98.8 °F (37.1 °C)] 97.8 °F (36.6 °C)  Pulse:  [71-82] 82  Resp:  [16-20] 18  SpO2:  [94 %-98 %] 98 %  BP: (124-142)/(72-95) 137/90     Weight: (!) 138.7 kg (305 lb 11.2 oz)  Body mass index is 45.14 kg/m².    Estimated Creatinine Clearance: 192.6 mL/min (based on SCr of 0.6 mg/dL).     Physical Exam  Vitals and nursing note reviewed.   Constitutional:       Appearance: He is well-developed.   HENT:      Head: Normocephalic and atraumatic.   Eyes:      General: No scleral icterus.        Right eye: No discharge.         Left eye: No discharge.      Conjunctiva/sclera: Conjunctivae normal.   Pulmonary:      Effort: Pulmonary effort is normal.   Musculoskeletal:         General: Normal range of motion.      Comments: Right fourth finger with decreased erythema. Are of maceration at site if I&D with purulent material noted. Nodular swelling.   Skin:     General: Skin is warm and dry.   Neurological:      Mental Status: He is alert and oriented to person, place, and time.   Psychiatric:         Behavior: Behavior normal.         Thought Content: Thought content  normal.         Judgment: Judgment normal.          Significant Labs: BMP:   Recent Labs   Lab 01/19/24  0437      *   K 3.6      CO2 21*   BUN 12   CREATININE 0.6   CALCIUM 9.5       CBC:   Recent Labs   Lab 01/19/24  0437   WBC 13.74*   HGB 16.8   HCT 52.2          Microbiology Results (last 7 days)       Procedure Component Value Units Date/Time    Blood Culture #1 **CANNOT BE ORDERED STAT** [[id number]] Collected: 01/17/24 1403    Order Status: Completed Specimen: Blood from Peripheral, Antecubital, Left Updated: 01/20/24 1612     Blood Culture, Routine No Growth to date      No Growth to date      No Growth to date      No Growth to date    Blood Culture #2 **CANNOT BE ORDERED STAT** [[id number]] Collected: 01/17/24 1403    Order Status: Completed Specimen: Blood from Peripheral, Antecubital, Right Updated: 01/20/24 1612     Blood Culture, Routine No Growth to date      No Growth to date      No Growth to date      No Growth to date    Aerobic culture [[id number]]  (Abnormal)  (Susceptibility) Collected: 01/17/24 1604    Order Status: Completed Specimen: Abscess from Finger, Right Hand Updated: 01/19/24 1359     Aerobic Bacterial Culture STAPHYLOCOCCUS AUREUS  Many      Narrative:      Right Ring Finger    Culture, Anaerobic [[id number]] Collected: 01/17/24 1604    Order Status: Completed Specimen: Abscess from Finger, Right Hand Updated: 01/19/24 1057     Anaerobic Culture Culture in progress    Narrative:      Right Ring Finger    AFB Culture & Smear [[id number]] Collected: 01/17/24 1604    Order Status: Completed Specimen: Abscess from Finger, Right Hand Updated: 01/18/24 2128     AFB Culture & Smear Culture in progress     AFB CULTURE STAIN No acid fast bacilli seen.    Narrative:      Right Ring Finger    Gram stain [[id number]] Collected: 01/17/24 1604    Order Status: Completed Specimen: Abscess from Finger, Right Hand Updated: 01/17/24 1828     Gram Stain Result Rare WBC's       No organisms seen    Narrative:      Right Ring Finger    Fungus culture [[id number]] Collected: 01/17/24 2928    Order Status: Sent Specimen: Abscess from Finger, Right Hand Updated: 01/17/24 5684            Significant Imaging: I have reviewed all pertinent imaging results/findings within the past 24 hours.

## 2024-01-20 NOTE — PROGRESS NOTES
"[facility]  Orthopedics  Progress Note    Patient Name: Chris Nava  MRN: [id number]  Admission Date: 1/17/2024  Hospital Length of Stay: 2 days  Attending Provider: Gayle Ross MD  Primary Care Provider: Shira Rodríguez NP    Subjective:     Principal Problem:Cellulitis of finger of right hand    Principal Orthopedic Problem: as above    Interval History: Afebrile, VSS, NAEON.  Bedside I&D performed yesterday.  Patient reports pain has been well-controlled.  Plan to start Betadine soaks b.i.d. today.    Review of patient's allergies indicates:  No Known Allergies    Current Facility-Administered Medications   Medication    acetaminophen tablet 650 mg    allopurinoL tablet 200 mg    ceFAZolin 2 g in dextrose 5 % in water (D5W) 50 mL IVPB (MB+)    celecoxib capsule 200 mg    DULoxetine DR capsule 60 mg    lisinopriL tablet 20 mg    oxyCODONE immediate release tablet 5 mg    vancomycin 2 g in dextrose 5 % 500 mL IVPB     Objective:     Vital Signs (Most Recent):  Temp: 97.6 °F (36.4 °C) (01/20/24 0452)  Pulse: 81 (01/20/24 0452)  Resp: 18 (01/20/24 0452)  BP: 130/85 (01/20/24 0452)  SpO2: 97 % (01/20/24 0452) Vital Signs (24h Range):  Temp:  [97.6 °F (36.4 °C)-98.8 °F (37.1 °C)] 97.6 °F (36.4 °C)  Pulse:  [75-81] 81  Resp:  [16-20] 18  SpO2:  [94 %-98 %] 97 %  BP: (117-146)/(72-93) 130/85     Weight: (!) 138.7 kg (305 lb 11.2 oz)  Height: 5' 9" (175.3 cm)  Body mass index is 45.14 kg/m².    No intake or output data in the 24 hours ending 01/20/24 0528      Ortho/SPM Exam  AAOx4  NAD  Reg rate  No increased WOB    RUE:  Dressing c/d/i; removed   Underlying I&D site now with expressible purulence  SILT   Painless active and passive ROM at Dip/Pip/Mcp joints   WWP extremities      Significant Labs: All pertinent labs within the past 24 hours have been reviewed.    Significant Imaging: I have reviewed and interpreted all pertinent imaging results/findings.  Assessment/Plan:     * Cellulitis " of finger of right hand  Chris Nava is a 53 y.o. male who presents with 2 days of worsening right ring finger swelling and pain.  Patient presented afebrile with labs showing WBC  , ESR  , CRP  .  On exam, patient had a erythematous fluctuance at the dorsum of the right ring finger P2 with proximal extension to the dorsum of fourth mcp joint.  0/4 kanavel signs, and patient had no other musculoskeletal pains on physical exam.  Bedside I&D was performed, and cultures were obtained.      - Cultures positive for staph aureus  - Abx: Per ID, currently vanc/cefepime   - plan for DC with 2 weeks Bactrim  - Pain: multimodal   - Betadine soaks b.i.d.    » Dispo: repeat CRP tomorrow w/ possible dc pending improvement           CONCHITA Krause MD  Orthopedics  [facility] S[facility]

## 2024-01-20 NOTE — SUBJECTIVE & OBJECTIVE
"Interval History: "OK. Still swollen". No acute overnight events. Leukocytosis improving. Wound culture with MSSA. S/P debridement # 2 on 1/19 with drainage or purulent material.    Review of Systems   Constitutional:  Negative for chills, fatigue and fever.   HENT:  Negative for ear pain, mouth sores, nosebleeds, postnasal drip, rhinorrhea, sinus pressure, sore throat, tinnitus, trouble swallowing and voice change.    Eyes:  Negative for photophobia, pain, redness and visual disturbance.   Respiratory:  Negative for apnea, cough, chest tightness, shortness of breath and wheezing.    Cardiovascular:  Negative for chest pain, palpitations and leg swelling.   Gastrointestinal:  Negative for abdominal pain, blood in stool, constipation, diarrhea, nausea and vomiting.   Endocrine: Negative for cold intolerance, heat intolerance, polydipsia and polyuria.   Genitourinary:  Negative for decreased urine volume, difficulty urinating, dysuria, flank pain, frequency, genital sores, hematuria, penile discharge, penile pain, penile swelling, scrotal swelling, testicular pain and urgency.   Musculoskeletal:  Negative for arthralgias, back pain, joint swelling, myalgias and neck pain.   Skin:  Positive for color change and wound. Negative for rash.   Allergic/Immunologic: Negative for environmental allergies and food allergies.   Neurological:  Negative for dizziness, seizures, syncope, weakness, light-headedness, numbness and headaches.   Hematological:  Negative for adenopathy. Does not bruise/bleed easily.   Psychiatric/Behavioral:  Negative for agitation, confusion, decreased concentration, hallucinations, self-injury, sleep disturbance and suicidal ideas. The patient is not nervous/anxious.      Objective:     Vital Signs (Most Recent):  Temp: 97.8 °F (36.6 °C) (01/20/24 1117)  Pulse: 82 (01/20/24 1117)  Resp: 18 (01/20/24 1117)  BP: (!) 137/90 (01/20/24 1117)  SpO2: 98 % (01/20/24 1117) Vital Signs (24h Range):  Temp:  [97.6 " °F (36.4 °C)-98.8 °F (37.1 °C)] 97.8 °F (36.6 °C)  Pulse:  [71-82] 82  Resp:  [16-20] 18  SpO2:  [94 %-98 %] 98 %  BP: (124-142)/(72-95) 137/90     Weight: (!) 138.7 kg (305 lb 11.2 oz)  Body mass index is 45.14 kg/m².    Estimated Creatinine Clearance: 192.6 mL/min (based on SCr of 0.6 mg/dL).     Physical Exam  Vitals and nursing note reviewed.   Constitutional:       Appearance: He is well-developed.   HENT:      Head: Normocephalic and atraumatic.   Eyes:      General: No scleral icterus.        Right eye: No discharge.         Left eye: No discharge.      Conjunctiva/sclera: Conjunctivae normal.   Pulmonary:      Effort: Pulmonary effort is normal.   Musculoskeletal:         General: Normal range of motion.      Comments: Right fourth finger with decreased erythema. Are of maceration at site if I&D with purulent material noted. Nodular swelling.   Skin:     General: Skin is warm and dry.   Neurological:      Mental Status: He is alert and oriented to person, place, and time.   Psychiatric:         Behavior: Behavior normal.         Thought Content: Thought content normal.         Judgment: Judgment normal.          Significant Labs: BMP:   Recent Labs   Lab 01/19/24  0437      *   K 3.6      CO2 21*   BUN 12   CREATININE 0.6   CALCIUM 9.5       CBC:   Recent Labs   Lab 01/19/24  0437   WBC 13.74*   HGB 16.8   HCT 52.2          Microbiology Results (last 7 days)       Procedure Component Value Units Date/Time    Blood Culture #1 **CANNOT BE ORDERED STAT** [[id number]] Collected: 01/17/24 1403    Order Status: Completed Specimen: Blood from Peripheral, Antecubital, Left Updated: 01/20/24 1612     Blood Culture, Routine No Growth to date      No Growth to date      No Growth to date      No Growth to date    Blood Culture #2 **CANNOT BE ORDERED STAT** [[id number]] Collected: 01/17/24 1403    Order Status: Completed Specimen: Blood from Peripheral, Antecubital, Right Updated: 01/20/24 1612      Blood Culture, Routine No Growth to date      No Growth to date      No Growth to date      No Growth to date    Aerobic culture [[id number]]  (Abnormal)  (Susceptibility) Collected: 01/17/24 1604    Order Status: Completed Specimen: Abscess from Finger, Right Hand Updated: 01/19/24 1359     Aerobic Bacterial Culture STAPHYLOCOCCUS AUREUS  Many      Narrative:      Right Ring Finger    Culture, Anaerobic [[id number]] Collected: 01/17/24 1604    Order Status: Completed Specimen: Abscess from Finger, Right Hand Updated: 01/19/24 1057     Anaerobic Culture Culture in progress    Narrative:      Right Ring Finger    AFB Culture & Smear [[id number]] Collected: 01/17/24 1604    Order Status: Completed Specimen: Abscess from Finger, Right Hand Updated: 01/18/24 2128     AFB Culture & Smear Culture in progress     AFB CULTURE STAIN No acid fast bacilli seen.    Narrative:      Right Ring Finger    Gram stain [[id number]] Collected: 01/17/24 1604    Order Status: Completed Specimen: Abscess from Finger, Right Hand Updated: 01/17/24 1828     Gram Stain Result Rare WBC's      No organisms seen    Narrative:      Right Ring Finger    Fungus culture [[id number]] Collected: 01/17/24 1604    Order Status: Sent Specimen: Abscess from Finger, Right Hand Updated: 01/17/24 1737            Significant Imaging: I have reviewed all pertinent imaging results/findings within the past 24 hours.

## 2024-01-21 VITALS
DIASTOLIC BLOOD PRESSURE: 78 MMHG | OXYGEN SATURATION: 98 % | WEIGHT: 305.69 LBS | BODY MASS INDEX: 45.28 KG/M2 | HEIGHT: 69 IN | HEART RATE: 74 BPM | RESPIRATION RATE: 16 BRPM | TEMPERATURE: 98 F | SYSTOLIC BLOOD PRESSURE: 124 MMHG

## 2024-01-21 PROBLEM — L02.511 ABSCESS OF FINGER OF RIGHT HAND: Chronic | Status: ACTIVE | Noted: 2024-01-18

## 2024-01-21 LAB — CRP SERPL-MCNC: 60.5 MG/L (ref 0–8.2)

## 2024-01-21 PROCEDURE — 63600175 PHARM REV CODE 636 W HCPCS: Performed by: ORTHOPAEDIC SURGERY

## 2024-01-21 PROCEDURE — 99233 SBSQ HOSP IP/OBS HIGH 50: CPT | Mod: ,,, | Performed by: INTERNAL MEDICINE

## 2024-01-21 PROCEDURE — 25000003 PHARM REV CODE 250

## 2024-01-21 PROCEDURE — 86140 C-REACTIVE PROTEIN: CPT

## 2024-01-21 PROCEDURE — 36415 COLL VENOUS BLD VENIPUNCTURE: CPT

## 2024-01-21 PROCEDURE — 25000003 PHARM REV CODE 250: Performed by: ORTHOPAEDIC SURGERY

## 2024-01-21 RX ORDER — CEFADROXIL 500 MG/1
500 CAPSULE ORAL EVERY 12 HOURS
Qty: 28 CAPSULE | Refills: 0 | Status: SHIPPED | OUTPATIENT
Start: 2024-01-21 | End: 2024-01-21

## 2024-01-21 RX ORDER — CELECOXIB 200 MG/1
200 CAPSULE ORAL DAILY
Qty: 30 CAPSULE | Refills: 1 | Status: SHIPPED | OUTPATIENT
Start: 2024-01-21 | End: 2024-02-19

## 2024-01-21 RX ORDER — CEFADROXIL 500 MG/1
1000 CAPSULE ORAL 2 TIMES DAILY
Qty: 56 CAPSULE | Refills: 0 | Status: SHIPPED | OUTPATIENT
Start: 2024-01-21 | End: 2024-02-04

## 2024-01-21 RX ORDER — ACETAMINOPHEN 325 MG/1
650 TABLET ORAL EVERY 6 HOURS
Qty: 112 TABLET | Refills: 0 | Status: SHIPPED | OUTPATIENT
Start: 2024-01-21 | End: 2024-02-04

## 2024-01-21 RX ORDER — OXYCODONE HYDROCHLORIDE 5 MG/1
5 TABLET ORAL EVERY 6 HOURS PRN
Qty: 16 TABLET | Refills: 0 | Status: SHIPPED | OUTPATIENT
Start: 2024-01-21 | End: 2024-01-31

## 2024-01-21 RX ADMIN — CELECOXIB 200 MG: 200 CAPSULE ORAL at 09:01

## 2024-01-21 RX ADMIN — ACETAMINOPHEN 650 MG: 325 TABLET ORAL at 10:01

## 2024-01-21 RX ADMIN — CEFAZOLIN 2 G: 2 INJECTION, POWDER, FOR SOLUTION INTRAMUSCULAR; INTRAVENOUS at 10:01

## 2024-01-21 RX ADMIN — ACETAMINOPHEN 650 MG: 325 TABLET ORAL at 01:01

## 2024-01-21 RX ADMIN — ACETAMINOPHEN 650 MG: 325 TABLET ORAL at 06:01

## 2024-01-21 RX ADMIN — OXYCODONE HYDROCHLORIDE 5 MG: 5 TABLET ORAL at 01:01

## 2024-01-21 RX ADMIN — CEFAZOLIN 2 G: 2 INJECTION, POWDER, FOR SOLUTION INTRAMUSCULAR; INTRAVENOUS at 01:01

## 2024-01-21 NOTE — PLAN OF CARE
Problem: Adult Inpatient Plan of Care  Goal: Plan of Care Review  Outcome: Ongoing, Progressing  Flowsheets (Taken 1/20/2024 2249)  Plan of Care Reviewed With: patient  Goal: Absence of Hospital-Acquired Illness or Injury  Outcome: Ongoing, Progressing  Intervention: Identify and Manage Fall Risk  Flowsheets (Taken 1/20/2024 2249)  Safety Promotion/Fall Prevention:   Fall Risk signage in place   high risk medications identified   lighting adjusted   medications reviewed  Goal: Optimal Comfort and Wellbeing  Outcome: Ongoing, Progressing  Intervention: Monitor Pain and Promote Comfort  Flowsheets (Taken 1/20/2024 2249)  Pain Management Interventions:   quiet environment facilitated   relaxation techniques promoted     Problem: Bariatric Environmental Safety  Goal: Safety Maintained with Care  Outcome: Ongoing, Progressing     Problem: Impaired Wound Healing  Goal: Optimal Wound Healing  Outcome: Ongoing, Progressing  Intervention: Promote Wound Healing  Flowsheets (Taken 1/20/2024 2249)  Sleep/Rest Enhancement: awakenings minimized  Pain Management Interventions:   quiet environment facilitated   relaxation techniques promoted

## 2024-01-21 NOTE — SUBJECTIVE & OBJECTIVE
"Principal Problem:Abscess of finger of right hand    Principal Orthopedic Problem: as above    Interval History: Afebrile, VSS, NAEON.  CRP 60 from 120 on AM labs.  Dressing change performed at bedside with improvement in swelling and erythema noted.  Plan for dc today with follow up in ortho hand clinic in 1 week for re-evaluation.      Review of patient's allergies indicates:  No Known Allergies    Current Facility-Administered Medications   Medication    acetaminophen tablet 650 mg    allopurinoL tablet 200 mg    ceFAZolin 2 g in dextrose 5 % in water (D5W) 50 mL IVPB (MB+)    celecoxib capsule 200 mg    DULoxetine DR capsule 60 mg    lisinopriL tablet 20 mg    oxyCODONE immediate release tablet 5 mg     Objective:     Vital Signs (Most Recent):  Temp: 97.8 °F (36.6 °C) (01/21/24 1118)  Pulse: 74 (01/21/24 1118)  Resp: 16 (01/21/24 1118)  BP: 124/78 (01/21/24 1118)  SpO2: 98 % (01/21/24 1118) Vital Signs (24h Range):  Temp:  [96.2 °F (35.7 °C)-98.8 °F (37.1 °C)] 97.8 °F (36.6 °C)  Pulse:  [73-78] 74  Resp:  [16-19] 16  SpO2:  [94 %-99 %] 98 %  BP: (105-143)/(59-88) 124/78     Weight: (!) 138.7 kg (305 lb 11.2 oz)  Height: 5' 9" (175.3 cm)  Body mass index is 45.14 kg/m².    No intake or output data in the 24 hours ending 01/21/24 1204       Ortho/SPM Exam  AAOx4  NAD  Reg rate  No increased WOB    RUE:  Dressing c/d/i; removed   Underlying I&D site now with expressible purulence  SILT   Painless active and passive ROM at Dip/Pip/Mcp joints   WWP extremities      Significant Labs: All pertinent labs within the past 24 hours have been reviewed.    Significant Imaging: I have reviewed and interpreted all pertinent imaging results/findings.  "

## 2024-01-21 NOTE — ASSESSMENT & PLAN NOTE
I have reviewed hospital notes from  Orthopedic Surgery service and other specialty providers. I have also reviewed CBC, CMP/BMP,  cultures and imaging with my interpretation as documented.     Cellulitis and abscess of the right fourth finger extending into the hand and wrist. Concern for Staphylococcal infection in the setting of purulent cellulitis despite negative gram stain. Post rounds cultures with S aureus pending susceptibilities. Patient reporting worsening swelling and dark discoloration of the finger this afternoon. Leukocytosis slightly down trending. CRP increase noted.  Continue cefazolin 2 gm every 8 hours while admitted.  Can transition to cefadroxil 1,000 mg BID or Keflex 500 mg PO every 6 hours for 14 days.   Will arrange ID clinic follow up.  Discussed management plan with the staff and/or members from Orthopedic Surgery service.

## 2024-01-21 NOTE — ASSESSMENT & PLAN NOTE
Chris Nava is a 53 y.o. male who presents with 2 days of worsening right ring finger swelling and pain.  Patient presented afebrile with labs showing WBC  , ESR  , CRP 60.5.  On exam, patient had a erythematous fluctuance at the dorsum of the right ring finger P2 with proximal extension to the dorsum of fourth mcp joint.  0/4 kanavel signs, and patient had no other musculoskeletal pains on physical exam.  Bedside I&D was performed, and cultures were obtained.      - Cultures positive for staph aureus  - Abx: Per ID, currently vanc/cefepime   - plan for DC with 2 weeks cefadroxil 1000mg bid   - Pain: multimodal   - Betadine soaks BID; continue for 1 week at discharge     » Dispo: discharge home with follow up in orthopedic hand clinic in 1 week for wound re-evaluation

## 2024-01-21 NOTE — DISCHARGE INSTRUCTIONS
For your right ring finger, please continue daily betadine soaks twice daily for 1 week.  Continue dressing changes as needed with a nonadherent pad over the ring finger wound.      Betadine Soaks of Right Hand  » 50% Normal Saline, 50% betadine  » 10-15 minutes, Twice daily    Continue to actively range the right ring finger as tolerated.    Call us if you experience: fever, chills, pain not relieved by oral medications, increased pain and swelling, redness or tenderness spreading to the hand, or any other questions or concerns. We are happy to assist you at any time.

## 2024-01-21 NOTE — HOSPITAL COURSE
Patient presented on the evening of 1/17/24 with two days of worsening right ring finger redness and swelling.  On presentation, he was noted to have elevated inflammatory markers in addition to abscess formation along the dorsal aspect of the right ring finger middle phalanx.  Bedside incision and drainage was performed while in the emergency department.  Samples were sent for cultures, and the wound was thoroughly irrigated.  Patient was subsequently admitted to the hospital for IV antibiotic treatment and evaluation by the infectious disease service.  On 1/19, cultures returned positive for methicillin sensitive staph aureus and repeat bedside I&D was performed for increased purulence from the ring finger abscess.  Twice daily betadine soaks of the right ring finger were then initiated.  Since that time, the patient's right ring finger has shown significant improvement, and inflammatory markers have decreased accordingly.  Currently, the patient's progress is sufficient to allow the them to be discharged to home safely on a 2 week course of oral cefadroxil 1000mg twice daily for continued antibiotic coverage.  The patient agrees with this assessment and desires a discharge today.

## 2024-01-21 NOTE — DISCHARGE SUMMARY
[facility]  Orthopedics  Discharge Summary      Patient Name: Chris Nava  MRN: [id number]  Admission Date: 1/17/2024  Hospital Length of Stay: 3 days  Discharge Date and Time:  01/21/2024 12:08 PM  Attending Physician: Gayle Ross MD   Discharging Provider: CONCHITA Krause MD  Primary Care Provider: Shira Rodríguez NP    HPI:   Chris Nava is a right hand dominant 53 y.o. male w/ no significant PMH, who presents with right ring finger redness and swelling.  Reports on Monday he noticed a small red bump at his right ring finger that became progressively more painful and swollen.  States pain is at the dorsum of his right ring finger and hand but denies any pain at his palm or any other areas of pain at the right upper extremity.  Denies any fevers, chills, and he reports he has not had any recent infections.  Patient is employed as a , ambulates with cane assistance at baseline.  Does not smoke, and is not on any blood thinners.      * No surgery found *      Hospital Course:  Patient presented on the evening of 1/17/24 with two days of worsening right ring finger redness and swelling.  On presentation, he was noted to have elevated inflammatory markers in addition to abscess formation along the dorsal aspect of the right ring finger middle phalanx.  Bedside incision and drainage was performed while in the emergency department.  Samples were sent for cultures, and the wound was thoroughly irrigated.  Patient was subsequently admitted to the hospital for IV antibiotic treatment and evaluation by the infectious disease service.  On 1/19, cultures returned positive for methicillin sensitive staph aureus and repeat bedside I&D was performed for increased purulence from the ring finger abscess.  Twice daily betadine soaks of the right ring finger were then initiated.  Since that time, the patient's right ring finger has shown significant improvement, and inflammatory markers  have decreased accordingly.  Currently, the patient's progress is sufficient to allow the them to be discharged to home safely on a 2 week course of oral cefadroxil 1000mg twice daily for continued antibiotic coverage.  The patient agrees with this assessment and desires a discharge today.    Goals of Care Treatment Preferences:         Consults (From admission, onward)          Status Ordering Provider     Inpatient consult to Orthopedic Surgery  Once        Provider:  (Not yet assigned)    Completed MICAELA GOVEA     Inpatient consult to Infectious Diseases  Once        Provider:  (Not yet assigned)    Completed MICAELA GOVEA            Significant Diagnostic Studies: Microbiology: Wound Culture: positive for MSSA    Pending Diagnostic Studies:       None          Final Active Diagnoses:    Diagnosis Date Noted POA    PRINCIPAL PROBLEM:  Abscess of finger of right hand [L02.511] 01/18/2024 Yes     Chronic    Cellulitis of finger of right hand [L03.011] 01/17/2024 Yes    Class 3 severe obesity without serious comorbidity with body mass index (BMI) of 45.0 to 49.9 in adult [E66.01, Z68.42] 10/11/2023 Not Applicable      Problems Resolved During this Admission:      Discharged Condition: good    Disposition: Home or Self Care    Follow Up: in orthopedic hand clinic in 1 week     Patient Instructions:      Call MD for:  temperature >100.4     Call MD for:  persistent nausea and vomiting     Call MD for:  severe uncontrolled pain     Wound care routine (specify)   Order Comments: Perform dressing changes as needed    Betadine Soaks of Right Hand  » 50% Normal Saline, 50% betadine  » 10-15 minutes, Twice daily     Medications:  Reconciled Home Medications:      Medication List        START taking these medications      acetaminophen 325 MG tablet  Commonly known as: TYLENOL  Take 2 tablets (650 mg total) by mouth every 6 (six) hours. for 14 days     cefadroxil 500 MG Cap  Commonly known as: DURICEF  Take 2  capsules (1,000 mg total) by mouth 2 (two) times a day. for 14 days     oxyCODONE 5 MG immediate release tablet  Commonly known as: ROXICODONE  Take 1 tablet (5 mg total) by mouth every 6 (six) hours as needed (For breakthrough pain).            CHANGE how you take these medications      * DULoxetine 60 MG capsule  Commonly known as: CYMBALTA  Take 1 capsule (60 mg total) by mouth once daily.  What changed: when to take this     * DULoxetine 20 MG capsule  Commonly known as: CYMBALTA  Take 1 capsule (20 mg total) by mouth once daily.  What changed: when to take this           * This list has 2 medication(s) that are the same as other medications prescribed for you. Read the directions carefully, and ask your doctor or other care provider to review them with you.                CONTINUE taking these medications      allopurinoL 100 MG tablet  Commonly known as: ZYLOPRIM  Take 200 mg by mouth nightly.     celecoxib 200 MG capsule  Commonly known as: CeleBREX  Take 1 capsule (200 mg total) by mouth once daily.     lisinopriL 20 MG tablet  Commonly known as: PRINIVIL,ZESTRIL  Take 20 mg by mouth nightly.     metFORMIN 500 MG ER 24hr tablet  Commonly known as: GLUCOPHAGE-XR  Take 500 mg by mouth every morning.     MULTI VITAMIN ORAL  Take 1 tablet by mouth once daily.     potassium citrate 15 mEq Tbsr  Commonly known as: UROCIT-K 15  Take 1 tablet by mouth nightly.              CONCHITA Krause MD  Orthopedics  [facility]

## 2024-01-21 NOTE — PROGRESS NOTES
"[facility]  Orthopedics  Progress Note    Patient Name: Chris Nava  MRN: [id number]  Admission Date: 1/17/2024  Hospital Length of Stay: 3 days  Attending Provider: Gayle Ross MD  Primary Care Provider: Shira Rodríguez NP    Subjective:     Principal Problem:Abscess of finger of right hand    Principal Orthopedic Problem: as above    Interval History: Afebrile, VSS, NAEON.  CRP 60 from 120 on AM labs.  Dressing change performed at bedside with improvement in swelling and erythema noted.  Plan for dc today with follow up in ortho hand clinic in 1 week for re-evaluation.      Review of patient's allergies indicates:  No Known Allergies    Current Facility-Administered Medications   Medication    acetaminophen tablet 650 mg    allopurinoL tablet 200 mg    ceFAZolin 2 g in dextrose 5 % in water (D5W) 50 mL IVPB (MB+)    celecoxib capsule 200 mg    DULoxetine DR capsule 60 mg    lisinopriL tablet 20 mg    oxyCODONE immediate release tablet 5 mg     Objective:     Vital Signs (Most Recent):  Temp: 97.8 °F (36.6 °C) (01/21/24 1118)  Pulse: 74 (01/21/24 1118)  Resp: 16 (01/21/24 1118)  BP: 124/78 (01/21/24 1118)  SpO2: 98 % (01/21/24 1118) Vital Signs (24h Range):  Temp:  [96.2 °F (35.7 °C)-98.8 °F (37.1 °C)] 97.8 °F (36.6 °C)  Pulse:  [73-78] 74  Resp:  [16-19] 16  SpO2:  [94 %-99 %] 98 %  BP: (105-143)/(59-88) 124/78     Weight: (!) 138.7 kg (305 lb 11.2 oz)  Height: 5' 9" (175.3 cm)  Body mass index is 45.14 kg/m².    No intake or output data in the 24 hours ending 01/21/24 1204      Ortho/SPM Exam  AAOx4  NAD  Reg rate  No increased WOB    RUE:  Dressing c/d/i; removed   Underlying I&D site now with expressible purulence  SILT   Painless active and passive ROM at Dip/Pip/Mcp joints   WWP extremities      Significant Labs: All pertinent labs within the past 24 hours have been reviewed.    Significant Imaging: I have reviewed and interpreted all pertinent imaging " results/findings.  Assessment/Plan:     Cellulitis of finger of right hand  Chris Nava is a 53 y.o. male who presents with 2 days of worsening right ring finger swelling and pain.  Patient presented afebrile with labs showing WBC  , ESR  , CRP 60.5.  On exam, patient had a erythematous fluctuance at the dorsum of the right ring finger P2 with proximal extension to the dorsum of fourth mcp joint.  0/4 kanavel signs, and patient had no other musculoskeletal pains on physical exam.  Bedside I&D was performed, and cultures were obtained.      - Cultures positive for staph aureus  - Abx: Per ID, currently vanc/cefepime   - plan for DC with 2 weeks cefadroxil 1000mg bid   - Pain: multimodal   - Betadine soaks BID; continue for 1 week at discharge     » Dispo: discharge home with follow up in orthopedic hand clinic in 1 week for wound re-evaluation           CONCHITA Krause MD  Orthopedics  [facility]

## 2024-01-21 NOTE — SUBJECTIVE & OBJECTIVE
"Interval History: "OK. Not sure how it is doing today". No acute overnight events. Leukocytosis improving. Wound culture with MSSA. S/P debridement # 2 on 1/19 with drainage or purulent material. Active ROM limited.     Review of Systems   Constitutional:  Negative for chills, fatigue and fever.   HENT:  Negative for ear pain, mouth sores, nosebleeds, postnasal drip, rhinorrhea, sinus pressure, sore throat, tinnitus, trouble swallowing and voice change.    Eyes:  Negative for photophobia, pain, redness and visual disturbance.   Respiratory:  Negative for apnea, cough, chest tightness, shortness of breath and wheezing.    Cardiovascular:  Negative for chest pain, palpitations and leg swelling.   Gastrointestinal:  Negative for abdominal pain, blood in stool, constipation, diarrhea, nausea and vomiting.   Endocrine: Negative for cold intolerance, heat intolerance, polydipsia and polyuria.   Genitourinary:  Negative for decreased urine volume, difficulty urinating, dysuria, flank pain, frequency, genital sores, hematuria, penile discharge, penile pain, penile swelling, scrotal swelling, testicular pain and urgency.   Musculoskeletal:  Negative for arthralgias, back pain, joint swelling, myalgias and neck pain.   Skin:  Positive for color change and wound. Negative for rash.   Allergic/Immunologic: Negative for environmental allergies and food allergies.   Neurological:  Negative for dizziness, seizures, syncope, weakness, light-headedness, numbness and headaches.   Hematological:  Negative for adenopathy. Does not bruise/bleed easily.   Psychiatric/Behavioral:  Negative for agitation, confusion, decreased concentration, hallucinations, self-injury, sleep disturbance and suicidal ideas. The patient is not nervous/anxious.      Objective:     Vital Signs (Most Recent):  Temp: 97.8 °F (36.6 °C) (01/21/24 1118)  Pulse: 74 (01/21/24 1118)  Resp: 16 (01/21/24 1118)  BP: 124/78 (01/21/24 1118)  SpO2: 98 % (01/21/24 1118) " "Vital Signs (24h Range):  Temp:  [96.2 °F (35.7 °C)-98.8 °F (37.1 °C)] 97.8 °F (36.6 °C)  Pulse:  [73-78] 74  Resp:  [16-19] 16  SpO2:  [94 %-99 %] 98 %  BP: (105-143)/(59-88) 124/78     Weight: (!) 138.7 kg (305 lb 11.2 oz)  Body mass index is 45.14 kg/m².    Estimated Creatinine Clearance: 192.6 mL/min (based on SCr of 0.6 mg/dL).     Physical Exam  Vitals and nursing note reviewed.   Constitutional:       Appearance: He is well-developed.   HENT:      Head: Normocephalic and atraumatic.   Eyes:      General: No scleral icterus.        Right eye: No discharge.         Left eye: No discharge.      Conjunctiva/sclera: Conjunctivae normal.   Pulmonary:      Effort: Pulmonary effort is normal.   Musculoskeletal:         General: Normal range of motion.      Comments: Right fourth finger with decreased erythema. Area of maceration at site if I&D with purulent material noted. Nodular swelling decreased.   Skin:     General: Skin is warm and dry.   Neurological:      Mental Status: He is alert and oriented to person, place, and time.   Psychiatric:         Behavior: Behavior normal.         Thought Content: Thought content normal.         Judgment: Judgment normal.          Significant Labs: BMP:   No results for input(s): "GLU", "NA", "K", "CL", "CO2", "BUN", "CREATININE", "CALCIUM", "MG" in the last 48 hours.    CBC:   No results for input(s): "WBC", "HGB", "HCT", "PLT" in the last 48 hours.    Microbiology Results (last 7 days)       Procedure Component Value Units Date/Time    Blood Culture #1 **CANNOT BE ORDERED STAT** [[id number]] Collected: 01/17/24 1403    Order Status: Completed Specimen: Blood from Peripheral, Antecubital, Left Updated: 01/20/24 1612     Blood Culture, Routine No Growth to date      No Growth to date      No Growth to date      No Growth to date    Blood Culture #2 **CANNOT BE ORDERED STAT** [[id number]] Collected: 01/17/24 1403    Order Status: Completed Specimen: Blood from Peripheral, " Antecubital, Right Updated: 01/20/24 1612     Blood Culture, Routine No Growth to date      No Growth to date      No Growth to date      No Growth to date    Aerobic culture [[id number]]  (Abnormal)  (Susceptibility) Collected: 01/17/24 1604    Order Status: Completed Specimen: Abscess from Finger, Right Hand Updated: 01/19/24 1359     Aerobic Bacterial Culture STAPHYLOCOCCUS AUREUS  Many      Narrative:      Right Ring Finger    Culture, Anaerobic [[id number]] Collected: 01/17/24 1604    Order Status: Completed Specimen: Abscess from Finger, Right Hand Updated: 01/19/24 1057     Anaerobic Culture Culture in progress    Narrative:      Right Ring Finger    AFB Culture & Smear [[id number]] Collected: 01/17/24 1604    Order Status: Completed Specimen: Abscess from Finger, Right Hand Updated: 01/18/24 2128     AFB Culture & Smear Culture in progress     AFB CULTURE STAIN No acid fast bacilli seen.    Narrative:      Right Ring Finger    Gram stain [[id number]] Collected: 01/17/24 1604    Order Status: Completed Specimen: Abscess from Finger, Right Hand Updated: 01/17/24 1828     Gram Stain Result Rare WBC's      No organisms seen    Narrative:      Right Ring Finger    Fungus culture [[id number]] Collected: 01/17/24 1604    Order Status: Sent Specimen: Abscess from Finger, Right Hand Updated: 01/17/24 1737            Significant Imaging: I have reviewed all pertinent imaging results/findings within the past 24 hours.

## 2024-01-21 NOTE — PROGRESS NOTES
Pt is preparing for discharge, pain managed with scheduled tylenol, tx completed this morning, pt has been ambulating in the hallway. Plan of care reviewed with pt as well as discharge instructions. Questions answered pt verbalizes his understanding. Currently waiting for meds to be delivered to the bedside.

## 2024-01-22 ENCOUNTER — HOSPITAL ENCOUNTER (OUTPATIENT)
Dept: RADIOLOGY | Facility: HOSPITAL | Age: 54
Discharge: HOME | End: 2024-01-22
Payer: COMMERCIAL

## 2024-01-22 ENCOUNTER — TELEPHONE (OUTPATIENT)
Dept: ORTHOPEDICS | Facility: CLINIC | Age: 54
End: 2024-01-22
Payer: COMMERCIAL

## 2024-01-22 LAB
BACTERIA BLD CULT: NORMAL
BACTERIA BLD CULT: NORMAL
BACTERIA SPEC ANAEROBE CULT: NORMAL

## 2024-01-22 PROCEDURE — 72148 MRI LUMBAR SPINE W/O DYE: CPT | Mod: 26,,, | Performed by: RADIOLOGY

## 2024-01-22 PROCEDURE — 72148 MRI LUMBAR SPINE W/O DYE: CPT | Mod: TC

## 2024-01-22 NOTE — PLAN OF CARE
[facility]  Discharge Final Note    Primary Care Provider: Shira Rodríguez NP    Expected Discharge Date: 1/21/2024    Final Discharge Note (most recent)       Final Note - 01/21/24 1850          Final Note    Assessment Type Final Discharge Note     Anticipated Discharge Disposition Home or Self Care     Hospital Resources/Appts/Education Provided Provided patient/caregiver with written discharge plan information        Post-Acute Status    Discharge Delays None known at this time                   Rossana Weiner RN  Weekend  - [facility] [place]  Spectralink: [phone]

## 2024-01-22 NOTE — PROGRESS NOTES
Pt discharged, IV access discontinued, meds recd at the bedside, pt left [facility] in wheelchair propelled by in house transport. He has no complaints or concerns.

## 2024-01-22 NOTE — TELEPHONE ENCOUNTER
Spoke c pt. Confirmed appt location & time c [facility] 01/31/24. Pt expressed understanding & was thankful.

## 2024-01-24 ENCOUNTER — TELEPHONE (OUTPATIENT)
Dept: ORTHOPEDICS | Facility: CLINIC | Age: 54
End: 2024-01-24
Payer: COMMERCIAL

## 2024-01-24 ENCOUNTER — HOSPITAL ENCOUNTER (OUTPATIENT)
Dept: RADIOLOGY | Facility: HOSPITAL | Age: 54
Discharge: HOME | End: 2024-01-24
Attending: ORTHOPAEDIC SURGERY
Payer: COMMERCIAL

## 2024-01-24 DIAGNOSIS — M89.9 BONE LESION: ICD-10-CM

## 2024-01-24 PROCEDURE — 72197 MRI PELVIS W/O & W/DYE: CPT | Mod: 26,,, | Performed by: RADIOLOGY

## 2024-01-24 PROCEDURE — 25500020 PHARM REV CODE 255: Performed by: ORTHOPAEDIC SURGERY

## 2024-01-24 PROCEDURE — A9585 GADOBUTROL INJECTION: HCPCS | Performed by: ORTHOPAEDIC SURGERY

## 2024-01-24 PROCEDURE — 72197 MRI PELVIS W/O & W/DYE: CPT | Mod: TC

## 2024-01-24 RX ORDER — GADOBUTROL 604.72 MG/ML
10 INJECTION INTRAVENOUS
Status: COMPLETED | OUTPATIENT
Start: 2024-01-24 | End: 2024-01-24

## 2024-01-24 RX ADMIN — GADOBUTROL 10 ML: 604.72 INJECTION INTRAVENOUS at 06:01

## 2024-01-25 ENCOUNTER — PATIENT MESSAGE (OUTPATIENT)
Dept: ORTHOPEDICS | Facility: CLINIC | Age: 54
End: 2024-01-25
Payer: COMMERCIAL

## 2024-01-29 ENCOUNTER — TELEPHONE (OUTPATIENT)
Dept: INFECTIOUS DISEASES | Facility: CLINIC | Age: 54
End: 2024-01-29
Payer: COMMERCIAL

## 2024-01-29 ENCOUNTER — OFFICE VISIT (OUTPATIENT)
Dept: INFECTIOUS DISEASES | Facility: CLINIC | Age: 54
End: 2024-01-29
Payer: COMMERCIAL

## 2024-01-29 DIAGNOSIS — A49.01 MSSA (METHICILLIN SUSCEPTIBLE STAPHYLOCOCCUS AUREUS) INFECTION: ICD-10-CM

## 2024-01-29 DIAGNOSIS — L02.511 ABSCESS OF FINGER OF RIGHT HAND: Primary | Chronic | ICD-10-CM

## 2024-01-29 PROCEDURE — 99215 OFFICE O/P EST HI 40 MIN: CPT | Mod: 95,,, | Performed by: INTERNAL MEDICINE

## 2024-01-29 PROCEDURE — 1160F RVW MEDS BY RX/DR IN RCRD: CPT | Mod: CPTII,95,, | Performed by: INTERNAL MEDICINE

## 2024-01-29 PROCEDURE — 1159F MED LIST DOCD IN RCRD: CPT | Mod: CPTII,95,, | Performed by: INTERNAL MEDICINE

## 2024-01-29 PROCEDURE — 1111F DSCHRG MED/CURRENT MED MERGE: CPT | Mod: CPTII,95,, | Performed by: INTERNAL MEDICINE

## 2024-01-29 NOTE — PROGRESS NOTES
The patient location is: Home  The chief complaint leading to consultation is: hospital followup    Visit type: audiovisual    Face to Face time with patient: 20  40 minutes of total time spent on the encounter, which includes face to face time and non-face to face time preparing to see the patient (eg, review of tests), Obtaining and/or reviewing separately obtained history, Documenting clinical information in the electronic or other health record, Independently interpreting results (not separately reported) and communicating results to the patient/family/caregiver, or Care coordination (not separately reported).     Each patient to whom he or she provides medical services by telemedicine is:  (1) informed of the relationship between the physician and patient and the respective role of any other health care provider with respect to management of the patient; and (2) notified that he or she may decline to receive medical services by telemedicine and may withdraw from such care at any time.    Notes:      S/p I&D abscess R 4th finger, cultures with MSSA.  Cefazolin while at [facility], taking 1 gm cefadroxil bid.  Has been on about a week, has 7 days of planned therapy remaining.  Wound appears to be healing, skin has sloughed off, drainage and pain much less.    Imp:    MSSA infection/abscess finger-on oral therapy    Plan:    Finish cefadroxil  Report poor progress or recurrence    Would suggest bactroban decolonization prior to elective hip replacement surgery, surg date tbd

## 2024-01-29 NOTE — TELEPHONE ENCOUNTER
----- Message from Anisha Jackson sent at 1/29/2024  9:57 AM CST -----  Regarding: Appt  Contact: Pt [phone]  Pt calling to reschedule appt for today at 2pm for an appt tomorrow, if possible. Attempted to reschedule. Please call [phone]

## 2024-01-31 ENCOUNTER — OFFICE VISIT (OUTPATIENT)
Dept: ORTHOPEDICS | Facility: CLINIC | Age: 54
End: 2024-01-31
Payer: COMMERCIAL

## 2024-01-31 ENCOUNTER — OFFICE VISIT (OUTPATIENT)
Dept: PAIN MEDICINE | Facility: CLINIC | Age: 54
End: 2024-01-31
Payer: COMMERCIAL

## 2024-01-31 VITALS
HEIGHT: 69 IN | BODY MASS INDEX: 45.29 KG/M2 | TEMPERATURE: 98 F | RESPIRATION RATE: 18 BRPM | WEIGHT: 305.75 LBS | OXYGEN SATURATION: 100 % | DIASTOLIC BLOOD PRESSURE: 82 MMHG | HEART RATE: 88 BPM | SYSTOLIC BLOOD PRESSURE: 130 MMHG

## 2024-01-31 DIAGNOSIS — M89.9 BONE LESION: Primary | ICD-10-CM

## 2024-01-31 DIAGNOSIS — M25.552 PAIN OF LEFT HIP: Primary | ICD-10-CM

## 2024-01-31 DIAGNOSIS — M16.12 PRIMARY OSTEOARTHRITIS OF LEFT HIP: ICD-10-CM

## 2024-01-31 PROCEDURE — 3008F BODY MASS INDEX DOCD: CPT | Mod: CPTII,S$GLB,, | Performed by: ANESTHESIOLOGY

## 2024-01-31 PROCEDURE — 3075F SYST BP GE 130 - 139MM HG: CPT | Mod: CPTII,S$GLB,, | Performed by: ANESTHESIOLOGY

## 2024-01-31 PROCEDURE — 3079F DIAST BP 80-89 MM HG: CPT | Mod: CPTII,S$GLB,, | Performed by: ANESTHESIOLOGY

## 2024-01-31 PROCEDURE — 99214 OFFICE O/P EST MOD 30 MIN: CPT | Mod: S$GLB,,, | Performed by: ANESTHESIOLOGY

## 2024-01-31 PROCEDURE — 99999 PR PBB SHADOW E&M-EST. PATIENT-LVL III: CPT | Mod: PBBFAC,,, | Performed by: ORTHOPAEDIC SURGERY

## 2024-01-31 PROCEDURE — 1159F MED LIST DOCD IN RCRD: CPT | Mod: CPTII,S$GLB,, | Performed by: ANESTHESIOLOGY

## 2024-01-31 PROCEDURE — 1159F MED LIST DOCD IN RCRD: CPT | Mod: CPTII,S$GLB,, | Performed by: ORTHOPAEDIC SURGERY

## 2024-01-31 PROCEDURE — 1111F DSCHRG MED/CURRENT MED MERGE: CPT | Mod: CPTII,S$GLB,, | Performed by: ANESTHESIOLOGY

## 2024-01-31 PROCEDURE — 1111F DSCHRG MED/CURRENT MED MERGE: CPT | Mod: CPTII,S$GLB,, | Performed by: ORTHOPAEDIC SURGERY

## 2024-01-31 PROCEDURE — 99213 OFFICE O/P EST LOW 20 MIN: CPT | Mod: S$GLB,,, | Performed by: ORTHOPAEDIC SURGERY

## 2024-01-31 PROCEDURE — 99999 PR PBB SHADOW E&M-EST. PATIENT-LVL IV: CPT | Mod: PBBFAC,,, | Performed by: ANESTHESIOLOGY

## 2024-01-31 RX ORDER — HYDROCODONE BITARTRATE AND ACETAMINOPHEN 7.5; 325 MG/1; MG/1
1 TABLET ORAL
Qty: 7 TABLET | Refills: 0 | Status: SHIPPED | OUTPATIENT
Start: 2024-01-31 | End: 2024-02-08

## 2024-01-31 NOTE — PROGRESS NOTES
Orthopaedic Oncology New Patient Visit:      Saumyar Scot Barrios III, MD  [address] [place] [address] and Shira Rodríguez NP,    We had the pleasure of evaluating Chris Nava in the Orthopaedic Clinic today at the [facility] today.      Chief Complaint: incidental lesion in pelvis on imaging    As you may recall, Chris Nava is a 53 y.o. male who was referred to me by a colleague for incidental finding of a bone lesion in the right pelvis. He has no personal history of cancer. No recent unintentional weight loss. His primary complaint his left hip pain. No smoking history. He uses a cane to ambulate because of left hip pain. He denies any right sided upper pelvis pain. No other known lesions. He had a PSA in fall which was normal.     PMH:   Past Medical History:   Diagnosis Date    Arthritis     Kidney stones        PSH:  has a past surgical history that includes Cystoscopy w/ laser lithotripsy and Vasectomy.    Allergies:   Allergies as of 01/31/2024    (No Known Allergies)       Medications:    Current Outpatient Medications:     acetaminophen (TYLENOL) 325 MG tablet, Take 2 tablets (650 mg total) by mouth every 6 (six) hours. for 14 days, Disp: 112 tablet, Rfl: 0    allopurinoL (ZYLOPRIM) 100 MG tablet, Take 200 mg by mouth nightly., Disp: , Rfl:     cefadroxil (DURICEF) 500 MG Cap, Take 2 capsules (1,000 mg total) by mouth 2 (two) times a day. for 14 days, Disp: 56 capsule, Rfl: 0    celecoxib (CELEBREX) 200 MG capsule, Take 1 capsule (200 mg total) by mouth once daily., Disp: 30 capsule, Rfl: 1    DULoxetine (CYMBALTA) 20 MG capsule, Take 1 capsule (20 mg total) by mouth once daily. (Patient taking differently: Take 20 mg by mouth nightly.), Disp: 90 capsule, Rfl: 3    DULoxetine (CYMBALTA) 60 MG capsule, Take 1 capsule (60 mg total) by mouth once daily. (Patient taking differently: Take 60 mg by mouth nightly.), Disp: 90 capsule, Rfl: 3    lisinopriL  (PRINIVIL,ZESTRIL) 20 MG tablet, Take 20 mg by mouth nightly., Disp: , Rfl:     metFORMIN (GLUCOPHAGE-XR) 500 MG ER 24hr tablet, Take 500 mg by mouth every morning., Disp: , Rfl:     multivit-min/ferrous fumarate (MULTI VITAMIN ORAL), Take 1 tablet by mouth once daily., Disp: , Rfl:     oxyCODONE (ROXICODONE) 5 MG immediate release tablet, Take 1 tablet (5 mg total) by mouth every 6 (six) hours as needed (For breakthrough pain)., Disp: 16 tablet, Rfl: 0    potassium citrate (UROCIT-K 15) 15 mEq TbSR, Take 1 tablet by mouth nightly., Disp: , Rfl:     Family History: family history includes Arthritis in his father and paternal grandmother; Asthma in his daughter; Cancer in his father, maternal grandmother, and paternal grandfather; Hearing loss in his father and maternal uncle; Hypertension in his brother, brother, and father; Stroke in his paternal grandmother.    Social History:  reports that he has never smoked. He has never been exposed to tobacco smoke. He has never used smokeless tobacco. He reports current alcohol use. He reports current drug use. Frequency: 7.00 times per week. Drug: Marijuana.    ROS:  A Complete review of systems was performed.  Pertinent positives are listed in the history of present illness above.  Remainder of review of systems were negative.      Vital Signs:      Physical exam:  General:  AAOX3, No acute distress, normal affect and disposition  Respiratory: Respirations unlabored    Focused examination of the right lower extremity was performed.   No pain with palpation about the pelvis or region of the lesion  Tolerates hip ROM  Tolerates knee ROM  Neurovascularly intact distally  No associated skin lesions      Imaging:    Radiographic Data:  xray of the hips from August shows the inferior aspect of the lesion in the right iliac wing, there is degenerative disease in the left hip; radiograph from November better shows the lesion in the right iliac wing which is sclerotic in nature,  continued left hip degenerative disease noted    MRI of the pelvis with and without contrast demonstrates reassuring features on the right iliac wing lesion as described on radiographs, no aggressive features, left hip shows degenerative changes as seen in radiographs        Assessment/Plan: Chris Nava is a 53 y.o. old male with incidentally found right iliac wing lesion which is benign appearing on imaging    We discussed the findings to date. I explained that based on his imaging that I do think this is a benign process. I will review his imaging with our expert radiologists and team in conference on Monday. For now plan to see him back in with AP pelvis xray in 6 months. He will let me know if he has any unintentional weight loss. He will also follow up with his PCP as well. I explained that if something changes we may need to change the plan, but for now there is nothing to intervene on or work up further as best we can tell based on imaging alone. I will let him know if conference consensus is different than our plan. All questions answered. He will follow up with Arthroplasty surgeon about his left hip.     Thank you for the referral and the opportunity to participate in the care of your patient.  If you have any questions regarding our treatment recommendations don't hesitate to call.    Electronically signed by:    Esther Ramirez MD   Orthopedic Oncology and Complex Arthroplasty Reconstruction  [facility]

## 2024-01-31 NOTE — PROGRESS NOTES
PCP: Shira Rodríguez NP    REFERRING PHYSICIAN: No ref. provider found    CHIEF COMPLAINT: Left Hip Pain    Original HISTORY OF PRESENT ILLNESS: Chris Nava presents to the clinic for the evaluation of the above pain. The pain started approximately 6 years ago after no particular event.     Original Pain Description:  The pain is located in the left groin. The pain is described as aching and stabbing. Exacerbating factors: Walking. Mitigating factors sitting. Symptoms interfere with daily activity. The patient feels like symptoms have been worsening. Patient denies night fever/night sweats, urinary incontinence, bowel incontinence, and significant motor weakness.    Original PAIN SCORES:  Best: Pain is 1  Worst: Pain is 10  Current: Pain is 1        1/31/2024     1:36 PM   Last 3 PDI Scores   Pain Disability Index (PDI) 50       INTERVAL HISTORY: (Newest visit at the bottom)     Interval History 11/1/23: Chris Nava is here for follow up. At last visit we planned to trial Celebrex and discuss with orthopaedics about left hip steroid injection. He states that he has not noticed significant difference since starting Celebrex. He states that while at work he experiences bilateral leg numbness within 20 minutes of standing and worries about falling due to loss of feeling. This numbness is relieved with leaning forward. He states he had an MRI ~5 years ago, and was told that he had bulging disc and degenerative disease. Denies fevers, night sweats, unintended weight loss, loss of bowel or bladder incontinence.     Interval History 1/31/2024: Chris Nava is here for follow-up of his left hip pain. He says the last 3 weeks the pain has been the worst it's been and he's now using a cane for ambulation. The pain is affecting his work. He saw Dr. Barrios (Ortho) and said he needs a KAYLA, but his weight would need to be less than 300lbs for him to operate. Per Dr. Barrios, if he got a steroid  "injection in his hip he wouldn't be able to operate for 6 months. He has been focusing on his diet for weight loss as his physical mobility is severely limited. He still intermittently gets numbness in both his legs while walking. Today, he walked from the parking garage to clinic and his legs did not go numb, but sometimes they will go numb when he's just walking to the bathroom at home. The numbness resolves when he sits and leans forward, but doesn't resolve when he leans forward while standing. He thinks the Celebrex is helping his pain -- he ran out a couple months ago and didn't have it for a week and his pain was significantly worse. He's also taking Cymbalta. He said he doesn't want opioids because they just make him "loopy," but they don't take away the pain. Medical marijuana helps him sleep through the night.      6 weeks of Conservative therapy:  PT: Sept-Oct, 2023 with focus on shoulders.  Chiro:  HEP:     Treatments / Medications: (Ice/Heat/NSAIDS/APAP/etc):  Ice  Heat  APAP  Cymbalta - helps  Celebrex  Marijuana QHS- helps    Interventional Pain Procedures:   May 2023 left hip iaCSi in [place], significant relief for 8hrs       Past Medical History:   Diagnosis Date    Arthritis     Kidney stones      Past Surgical History:   Procedure Laterality Date    CYSTOSCOPY W/ LASER LITHOTRIPSY      VASECTOMY       Social History     Socioeconomic History    Marital status:    Tobacco Use    Smoking status: Never     Passive exposure: Never    Smokeless tobacco: Never   Substance and Sexual Activity    Alcohol use: Yes     Comment: Maybe wine at dinner 1x per week, maybe bourbon 1 night p/wk    Drug use: Yes     Frequency: 7.0 times per week     Types: Marijuana     Comment: Sleep aid only for arthritic purposes. Use for 2 months only    Sexual activity: Yes     Partners: Female     Birth control/protection: Post-menopausal     Comment: I have a vasectomy, she is post menopausal     Social Determinants " of Health     Financial Resource Strain: Low Risk  (1/29/2024)    Overall Financial Resource Strain (CARDIA)     Difficulty of Paying Living Expenses: Not very hard   Food Insecurity: No Food Insecurity (1/29/2024)    Hunger Vital Sign     Worried About Running Out of Food in the Last Year: Never true     Ran Out of Food in the Last Year: Never true   Transportation Needs: No Transportation Needs (1/29/2024)    PRAPARE - Transportation     Lack of Transportation (Medical): No     Lack of Transportation (Non-Medical): No   Physical Activity: Inactive (1/29/2024)    Exercise Vital Sign     Days of Exercise per Week: 0 days     Minutes of Exercise per Session: 0 min   Stress: No Stress Concern Present (1/29/2024)    Citizen of [place] of Occupational Health - Occupational Stress Questionnaire     Feeling of Stress : Not at all   Recent Concern: Stress - Stress Concern Present (1/18/2024)    Citizen of [place] [employer] - Occupational Stress Questionnaire     Feeling of Stress : To some extent   Social Connections: Socially Isolated (1/29/2024)    Social Connection and Isolation Panel [NHANES]     Frequency of Communication with Friends and Family: More than three times a week     Frequency of Social Gatherings with Friends and Family: Three times a week     Attends Mu-ism Services: Never     Active Member of Clubs or Organizations: No     Attends Club or Organization Meetings: Never     Marital Status:    Housing Stability: Low Risk  (1/29/2024)    Housing Stability Vital Sign     Unable to Pay for Housing in the Last Year: No     Number of Places Lived in the Last Year: 2     Unstable Housing in the Last Year: No     Family History   Problem Relation Age of Onset    Arthritis Father     Cancer Father     Hearing loss Father     Hypertension Father     Cancer Maternal Grandmother     Cancer Paternal Grandfather     Arthritis Paternal Grandmother     Stroke Paternal Grandmother     Asthma Daughter      Hearing loss Maternal Uncle     Hypertension Brother     Hypertension Brother        Review of patient's allergies indicates:  No Known Allergies    Current Outpatient Medications   Medication Sig    acetaminophen (TYLENOL) 325 MG tablet Take 2 tablets (650 mg total) by mouth every 6 (six) hours. for 14 days    allopurinoL (ZYLOPRIM) 100 MG tablet Take 200 mg by mouth nightly.    cefadroxil (DURICEF) 500 MG Cap Take 2 capsules (1,000 mg total) by mouth 2 (two) times a day. for 14 days    celecoxib (CELEBREX) 200 MG capsule Take 1 capsule (200 mg total) by mouth once daily.    DULoxetine (CYMBALTA) 20 MG capsule Take 1 capsule (20 mg total) by mouth once daily. (Patient taking differently: Take 20 mg by mouth nightly.)    DULoxetine (CYMBALTA) 60 MG capsule Take 1 capsule (60 mg total) by mouth once daily. (Patient taking differently: Take 60 mg by mouth nightly.)    lisinopriL (PRINIVIL,ZESTRIL) 20 MG tablet Take 20 mg by mouth nightly.    metFORMIN (GLUCOPHAGE-XR) 500 MG ER 24hr tablet Take 500 mg by mouth every morning.    multivit-min/ferrous fumarate (MULTI VITAMIN ORAL) Take 1 tablet by mouth once daily.    oxyCODONE (ROXICODONE) 5 MG immediate release tablet Take 1 tablet (5 mg total) by mouth every 6 (six) hours as needed (For breakthrough pain).    potassium citrate (UROCIT-K 15) 15 mEq TbSR Take 1 tablet by mouth nightly.     No current facility-administered medications for this visit.       ROS:  GENERAL: No fever. No chills. No fatigue. Denies weight loss. Denies weight gain.  HEENT: Denies headaches. Denies vision change. Denies eye pain. Denies double vision. Denies ear pain.   CV: Denies chest pain.   PULM: Denies of shortness of breath.  GI: Denies constipation. No diarrhea. No abdominal pain. Denies nausea. Denies vomiting. No blood in stool.  HEME: Denies bleeding problems.  : Denies urgency. No painful urination. No blood in urine.  MS: See HPI.  SKIN: Denies rash.   NEURO: Denies seizures. No  "weakness.  PSYCH:  Denies difficulty sleeping. No anxiety. Denies depression. No suicidal thoughts.       VITALS:   Vitals:    24 1338   BP: 130/82   Pulse: 88   Resp: 18   Temp: 98 °F (36.7 °C)   SpO2: 100%   Weight: (!) 138.7 kg (305 lb 12.5 oz)   Height: 5' 9" (1.753 m)   PainSc: 10-Worst pain ever   PainLoc: Hip       PHYSICAL EXAM:   GENERAL: Well appearing, in no acute distress, alert and oriented x3.  PSYCH:  Mood and affect appropriate.  SKIN: Skin color, texture, turgor normal, no rashes or lesions.  HEENT:  Normocephalic, atraumatic. Cranial nerves grossly intact.  PULM: No evidence of respiratory difficulty, symmetric chest rise.  GI:  Non-distended  BACK: Normal range of motion. No pain to palpation over the spinous processes. No pain to palpation over facet joints. There is no pain with palpation over the sacroiliac joints bilaterally.   EXTREMITIES: No deformities, edema, or skin discoloration.   MUSCULOSKELETAL: Pain reproduced with left hip log roll.   NEURO: Sensation is equal and appropriate bilaterally. Bilateral upper and lower extremity strength is normal and symmetric. Bilateral upper and lower extremity coordination and muscle stretch reflexes are physiologic and symmetric. Plantar response are downgoing.   GAIT: Antalgic. Uses cane to ambulate.     LABS:  Lab Results   Component Value Date    WBC 13.74 (H) 2024    HGB 16.8 2024    HCT 52.2 2024    MCV 86 2024     2024     Lab Results   Component Value Date    CREATININE 0.6 2024    BUN 12 2024     (L) 2024    K 3.6 2024     2024    CO2 21 (L) 2024       IMAGIN/22/24 EXAMINATION: MRI LUMBAR SPINE WITHOUT CONTRAST     CLINICAL HISTORY: Low back pain, progressive neurologic deficit; Dorsalgia, unspecified  TECHNIQUE: Multiplanar, multisequence MR images were acquired from the thoracolumbar junction to the sacrum without contrast.  COMPARISON: " None.     FINDINGS:  Alignment: Minimal retrolisthesis of L1-L2.     Vertebrae: Diffuse low T1 signal throughout the visualized axial skeleton.     Discs: Mild-to-moderate disc height loss throughout the lumbar spine.     Cord: Conus terminates at T12-L1 and appears unremarkable.  Cauda equina appears unremarkable.     Degenerative findings:     T12-L1: No spinal canal stenosis or neuroforaminal narrowing.  L1-L2: Circumferential disc bulge and mild facet arthropathy result in mild spinal canal stenosis and moderate left, mild right neural foraminal narrowing.  L2-L3: Circumferential disc bulge and mild facet arthropathy result in mild effacement of the thecal sac and moderate bilateral neural foraminal narrowing.  L3-L4: Circumferential disc bulge with central protrusion and severe facet arthropathy result in moderate spinal canal stenosis and moderate bilateral neural foraminal narrowing.  L4-L5: Circumferential disc bulge and severe facet arthropathy result in mild bilateral neural foraminal narrowing.  L5-S1: Moderate facet arthropathy results in mild right neural foraminal narrowing.     Paraspinal muscles & soft tissues: Moderate paraspinal muscle atrophy.     Impression:  1. Diffusely abnormal bone marrow signal, which may be seen with red marrow hyperplasia or marrow infiltrative process.  Clinical evaluation advised.  2. Multilevel degenerative changes detailed above.  Moderate spinal canal stenosis noted at L3-L4.  Moderate neural foraminal narrowing noted at L1-L4.      12/12/23 EXAMINATION: XR HIP WITH PELVIS WHEN PERFORMED, 2 OR 3 VIEWS LEFT     CLINICAL HISTORY: Pain in left hip     TECHNIQUE: AP view of the pelvis and frog leg lateral view of the left hip were performed.     COMPARISON: None     FINDINGS:  Right hip is intact with satisfactory joint space.  Left hip shows severe degenerative joint disease with obliteration of joint space in weight-bearing area, bone-on-bone contact, flattening of the  femoral head contour, marginal spurring, and subcortical cystic change.  Underlying AVN of the femoral head is possible.     The SI joints are intact.  Degenerative changes are noted at lower lumbar disc spaces.     Upper portion of the right iliac wing has a sclerotic focus 2.5 cm across.  Imaging possibilities include benign bone island and a blastic lesion from neoplastic disease.  Comparison to any old exam that may be available outside would be helpful to evaluate for stability.  If none are available, further imaging evaluation is recommended such as bone scan or MRI.     Impression:  Severe DJD left hip.  Sclerotic focus in right iliac wing of uncertain etiology.  Further evaluation recommended above.      ASSESSMENT: 53 y.o. year old male with pain, consistent with:    Encounter Diagnoses   Name Primary?    Primary osteoarthritis of left hip     Pain of left hip Yes         DISCUSSION: Chris Nava is a  for [employer] who comes to us with left hip pain which is worst with standing and walking. Imaging shows severe changes in his hip. Exam shows pain reproduced with hip testing. KAYLA pending with Dr. Barrios. He was doing well but his pain is becoming debilitating and surgery is still several months away.     PLAN:  Continue Celebrex, Cymbalta  Continue surgical scheduling with Dr. Barrios.   No hip injection as it would delay surgery  Trial Hydrocodone-acetaminophen 7.5-325mg QHS prn for 7 days to help with sleep until KAYLA. He will reach out with results.   Follow-up in 1 month      Israel Casarez MD (PGY3/CA2)  Resident Physician, Anesthesiology  [facility]

## 2024-02-01 ENCOUNTER — TELEPHONE (OUTPATIENT)
Dept: ORTHOPEDICS | Facility: CLINIC | Age: 54
End: 2024-02-01
Payer: COMMERCIAL

## 2024-02-01 NOTE — TELEPHONE ENCOUNTER
Spoke c pt. Confirmed appt location & time c [facility] 2/6/24. Pt expressed understanding & was thankful.

## 2024-02-05 ENCOUNTER — TELEPHONE (OUTPATIENT)
Dept: ORTHOPEDICS | Facility: CLINIC | Age: 54
End: 2024-02-05
Payer: COMMERCIAL

## 2024-02-05 NOTE — TELEPHONE ENCOUNTER
Spoke c pt. Confirmed appt location & time c [facility], 02/06/24. Pt expressed understanding & was thankful and stated that he may possibly be 5-6 mins late as he is coming from a 2:00 appt in our area. Pt is aware of the 15 min late policy and assured he would be here well before that time.      Pt c/o weakness, chills, and fatigue x4 days

## 2024-02-06 ENCOUNTER — OFFICE VISIT (OUTPATIENT)
Dept: ORTHOPEDICS | Facility: CLINIC | Age: 54
End: 2024-02-06
Payer: COMMERCIAL

## 2024-02-06 VITALS — WEIGHT: 305.75 LBS | BODY MASS INDEX: 45.29 KG/M2 | HEIGHT: 69 IN

## 2024-02-06 DIAGNOSIS — L03.011 CELLULITIS OF FINGER OF RIGHT HAND: Primary | ICD-10-CM

## 2024-02-06 PROCEDURE — 99203 OFFICE O/P NEW LOW 30 MIN: CPT | Mod: S$GLB,,, | Performed by: SPECIALIST/TECHNOLOGIST

## 2024-02-06 PROCEDURE — 3008F BODY MASS INDEX DOCD: CPT | Mod: CPTII,S$GLB,, | Performed by: SPECIALIST/TECHNOLOGIST

## 2024-02-06 PROCEDURE — 1159F MED LIST DOCD IN RCRD: CPT | Mod: CPTII,S$GLB,, | Performed by: SPECIALIST/TECHNOLOGIST

## 2024-02-06 PROCEDURE — 99999 PR PBB SHADOW E&M-EST. PATIENT-LVL III: CPT | Mod: PBBFAC,,, | Performed by: SPECIALIST/TECHNOLOGIST

## 2024-02-06 PROCEDURE — 1111F DSCHRG MED/CURRENT MED MERGE: CPT | Mod: CPTII,S$GLB,, | Performed by: SPECIALIST/TECHNOLOGIST

## 2024-02-06 NOTE — PROGRESS NOTES
Subjective:       Patient ID: Chris Nava is a 53 y.o. male.    Chief Complaint: Swelling of the Right Hand      02/06/2024   Patient reports for follow up after ED for right ring finger cellulitis.  He was treated in the ED with a bedside I and D and cultures that grew MSSA.  He was discharged on cefadroxil 1000 mg twice daily for 2 weeks, which he is completed.  He states he has been performing twice daily Betadine soaks with saline 50% each. He presents today for clearance to begin discussion for a total hip replacement. Today he presents with improved pain. He denies any numbness or tingling.       Past Medical History:   Diagnosis Date    Arthritis     Kidney stones      Past Surgical History:   Procedure Laterality Date    CYSTOSCOPY W/ LASER LITHOTRIPSY      VASECTOMY       Family History   Problem Relation Age of Onset    Arthritis Father     Cancer Father     Hearing loss Father     Hypertension Father     Cancer Maternal Grandmother     Cancer Paternal Grandfather     Arthritis Paternal Grandmother     Stroke Paternal Grandmother     Asthma Daughter     Hearing loss Maternal Uncle     Hypertension Brother     Hypertension Brother      Social History     Socioeconomic History    Marital status:    Tobacco Use    Smoking status: Never     Passive exposure: Never    Smokeless tobacco: Never   Substance and Sexual Activity    Alcohol use: Yes     Comment: Maybe wine at dinner 1x per week, maybe bourbon 1 night p/wk    Drug use: Yes     Frequency: 7.0 times per week     Types: Marijuana     Comment: Sleep aid only for arthritic purposes. Use for 2 months only    Sexual activity: Yes     Partners: Female     Birth control/protection: Post-menopausal     Comment: I have a vasectomy, she is post menopausal     Social Determinants of Health     Financial Resource Strain: Low Risk  (1/29/2024)    Overall Financial Resource Strain (CARDIA)     Difficulty of Paying Living Expenses: Not very hard    Food Insecurity: No Food Insecurity (1/29/2024)    Hunger Vital Sign     Worried About Running Out of Food in the Last Year: Never true     Ran Out of Food in the Last Year: Never true   Transportation Needs: No Transportation Needs (1/29/2024)    PRAPARE - Transportation     Lack of Transportation (Medical): No     Lack of Transportation (Non-Medical): No   Physical Activity: Inactive (1/29/2024)    Exercise Vital Sign     Days of Exercise per Week: 0 days     Minutes of Exercise per Session: 0 min   Stress: No Stress Concern Present (1/29/2024)    [employer] [place] [employer] - Occupational Stress Questionnaire     Feeling of Stress : Not at all   Recent Concern: Stress - Stress Concern Present (1/18/2024)    [employer] [place] [employer] - Occupational Stress Questionnaire     Feeling of Stress : To some extent   Social Connections: Socially Isolated (1/29/2024)    Social Connection and Isolation Panel [NHANES]     Frequency of Communication with Friends and Family: More than three times a week     Frequency of Social Gatherings with Friends and Family: Three times a week     Attends Jain Services: Never     Active Member of Clubs or Organizations: No     Attends Club or Organization Meetings: Never     Marital Status:    Housing Stability: Low Risk  (1/29/2024)    Housing Stability Vital Sign     Unable to Pay for Housing in the Last Year: No     Number of Places Lived in the Last Year: 2     Unstable Housing in the Last Year: No       Current Outpatient Medications   Medication Sig Dispense Refill    allopurinoL (ZYLOPRIM) 100 MG tablet Take 200 mg by mouth nightly.      celecoxib (CELEBREX) 200 MG capsule Take 1 capsule (200 mg total) by mouth once daily. 30 capsule 1    DULoxetine (CYMBALTA) 20 MG capsule Take 1 capsule (20 mg total) by mouth once daily. (Patient taking differently: Take 20 mg by mouth nightly.) 90 capsule 3    DULoxetine (CYMBALTA) 60 MG capsule Take 1  "capsule (60 mg total) by mouth once daily. (Patient taking differently: Take 60 mg by mouth nightly.) 90 capsule 3    HYDROcodone-acetaminophen (NORCO) 7.5-325 mg per tablet Take 1 tablet by mouth every 24 hours as needed for Pain. 7 tablet 0    lisinopriL (PRINIVIL,ZESTRIL) 20 MG tablet Take 20 mg by mouth nightly.      metFORMIN (GLUCOPHAGE-XR) 500 MG ER 24hr tablet Take 500 mg by mouth every morning.      multivit-min/ferrous fumarate (MULTI VITAMIN ORAL) Take 1 tablet by mouth once daily.      potassium citrate (UROCIT-K 15) 15 mEq TbSR Take 1 tablet by mouth nightly.       No current facility-administered medications for this visit.     Review of patient's allergies indicates:  No Known Allergies    Review of Systems        Objective:      Vitals:    02/06/24 1521   Weight: (!) 138.7 kg (305 lb 12.5 oz)   Height: 5' 9" (1.753 m)     Physical Exam  Cardiovascular:      Pulses:           Radial pulses are Normal on the right side and Normal on the left side.       Hand/Wrist Musculoskeletal Exam    Inspection    Right      Erythema: none      Ecchymosis: none      Edema: none      Deformity: none      Hand - prior incision comment: I&D Incision Present on the middle phalanx      Incision: clean    Left      Erythema: none      Ecchymosis: none      Edema: none      Deformity: none    Palpation    Palpation additional comments: Non TTP throughout    Range of Motion        Range of motion additional comments: Able to make a composite fist    Neurovascular    Right       Radial pulse: normal      Capillary refill: brisk and <3 sec      Ulnar nerve sensory distribution: normal      Median nerve sensory distribution: normal      Superficial radial nerve sensory distribution: normal    Left       Radial pulse: normal      Capillary refill: brisk and <3 sec      Ulnar nerve sensory distribution: normal      Median nerve sensory distribution: normal      Superficial radial nerve sensory distribution: " normal      Diagnostics Review: X-Ray: Reviewed  1/17/24  R Hand XR  FINDINGS:  Mild DJD.  No fracture or dislocation.  No bone destruction identified       Assessment:       1. Cellulitis of finger of right hand        Plan:       Patient has completed antibiotics in his able to have full range of motion.  He does have noted erythema about the ring finger.  We will follow up with him in 1 week and we will get repeat x-rays of the right hand.  We will have him continue doing Betadine soaks with saline for 20 minutes twice a day.

## 2024-02-07 ENCOUNTER — PATIENT MESSAGE (OUTPATIENT)
Dept: ORTHOPEDICS | Facility: CLINIC | Age: 54
End: 2024-02-07
Payer: COMMERCIAL

## 2024-02-07 ENCOUNTER — TELEPHONE (OUTPATIENT)
Dept: ORTHOPEDICS | Facility: CLINIC | Age: 54
End: 2024-02-07
Payer: COMMERCIAL

## 2024-02-07 ENCOUNTER — PATIENT MESSAGE (OUTPATIENT)
Dept: PAIN MEDICINE | Facility: CLINIC | Age: 54
End: 2024-02-07
Payer: COMMERCIAL

## 2024-02-07 DIAGNOSIS — R52 PAIN: Primary | ICD-10-CM

## 2024-02-07 DIAGNOSIS — M16.12 PRIMARY OSTEOARTHRITIS OF LEFT HIP: ICD-10-CM

## 2024-02-07 DIAGNOSIS — M25.552 PAIN OF LEFT HIP: Primary | ICD-10-CM

## 2024-02-07 NOTE — TELEPHONE ENCOUNTER
----- Message from Saurabh Ramírez sent at 2/7/2024  6:40 AM CST -----  Good morning Pavel,     Looks like you wanted him to come in a week for a re-check but I don't see an appointment made. Is someone going to get him scheduled?     Thank you!    Sincerely,   Ajith Darrell MS, OTC  OR & Clinical Assistant to Dr. Scot Barrios III  Phone: [phone]  Fax: [phone]    ----- Message -----  From: Scot Barrios III, MD  Sent: 2/6/2024   9:51 PM CST  To: Pavel Mckeon PA-C; Saurabh Ramírez    Great, thank you  Tentative Sx date for hip in march   ----- Message -----  From: Pavel Mckeon PA-C  Sent: 2/6/2024   5:36 PM CST  To: Scot Barrios III, MD    Wound is still healing. Good dermis layer has formed. Will continue to track.

## 2024-02-08 RX ORDER — HYDROCODONE BITARTRATE AND ACETAMINOPHEN 7.5; 325 MG/1; MG/1
1 TABLET ORAL EVERY 12 HOURS PRN
Qty: 45 TABLET | Refills: 0 | Status: SHIPPED | OUTPATIENT
Start: 2024-02-08 | End: 2024-03-09

## 2024-02-16 ENCOUNTER — TELEPHONE (OUTPATIENT)
Dept: ORTHOPEDICS | Facility: CLINIC | Age: 54
End: 2024-02-16
Payer: COMMERCIAL

## 2024-02-19 DIAGNOSIS — M16.12 PRIMARY OSTEOARTHRITIS OF LEFT HIP: ICD-10-CM

## 2024-02-19 LAB — FUNGUS SPEC CULT: NORMAL

## 2024-02-19 RX ORDER — CELECOXIB 200 MG/1
200 CAPSULE ORAL
Qty: 30 CAPSULE | Refills: 1 | Status: ON HOLD | OUTPATIENT
Start: 2024-02-19 | End: 2024-03-20 | Stop reason: HOSPADM

## 2024-02-20 ENCOUNTER — HOSPITAL ENCOUNTER (OUTPATIENT)
Dept: RADIOLOGY | Facility: OTHER | Age: 54
Discharge: HOME | End: 2024-02-20
Attending: SPECIALIST/TECHNOLOGIST
Payer: COMMERCIAL

## 2024-02-20 ENCOUNTER — OFFICE VISIT (OUTPATIENT)
Dept: ORTHOPEDICS | Facility: CLINIC | Age: 54
End: 2024-02-20
Payer: COMMERCIAL

## 2024-02-20 VITALS — HEIGHT: 69 IN | BODY MASS INDEX: 45.29 KG/M2 | WEIGHT: 305.75 LBS

## 2024-02-20 DIAGNOSIS — L03.011 CELLULITIS OF FINGER OF RIGHT HAND: Primary | ICD-10-CM

## 2024-02-20 DIAGNOSIS — R52 PAIN: ICD-10-CM

## 2024-02-20 PROCEDURE — 99213 OFFICE O/P EST LOW 20 MIN: CPT | Mod: S$GLB,,, | Performed by: SPECIALIST/TECHNOLOGIST

## 2024-02-20 PROCEDURE — 3008F BODY MASS INDEX DOCD: CPT | Mod: CPTII,S$GLB,, | Performed by: SPECIALIST/TECHNOLOGIST

## 2024-02-20 PROCEDURE — 1111F DSCHRG MED/CURRENT MED MERGE: CPT | Mod: CPTII,S$GLB,, | Performed by: SPECIALIST/TECHNOLOGIST

## 2024-02-20 PROCEDURE — 73130 X-RAY EXAM OF HAND: CPT | Mod: TC,FY,RT

## 2024-02-20 PROCEDURE — 1159F MED LIST DOCD IN RCRD: CPT | Mod: CPTII,S$GLB,, | Performed by: SPECIALIST/TECHNOLOGIST

## 2024-02-20 PROCEDURE — 99999 PR PBB SHADOW E&M-EST. PATIENT-LVL III: CPT | Mod: PBBFAC,,, | Performed by: SPECIALIST/TECHNOLOGIST

## 2024-02-20 PROCEDURE — 73130 X-RAY EXAM OF HAND: CPT | Mod: 26,RT,, | Performed by: RADIOLOGY

## 2024-02-20 NOTE — PROGRESS NOTES
Subjective:       Patient ID: Chris Nava is a 53 y.o. male.    Chief Complaint: Swelling of the Right Hand      02/6/2024   Patient reports for follow up after ED for right ring finger cellulitis.  He was treated in the ED with a bedside I and D and cultures that grew MSSA.  He was discharged on cefadroxil 1000 mg twice daily for 2 weeks, which he is completed.  He states he has been performing twice daily Betadine soaks with saline 50% each. He presents today for clearance to begin discussion for a total hip replacement. Today he presents with improved pain. He denies any numbness or tingling.     2/20/24  Patient reports today for right ring finger cellulitis.  He states he is completed all his antibiotics.  He denies any pain within the finger.  He reports he is stopped doing the Betadine soaks for about 1 week now.    Past Medical History:   Diagnosis Date    Arthritis     Kidney stones      Past Surgical History:   Procedure Laterality Date    CYSTOSCOPY W/ LASER LITHOTRIPSY      VASECTOMY       Family History   Problem Relation Age of Onset    Arthritis Father     Cancer Father     Hearing loss Father     Hypertension Father     Cancer Maternal Grandmother     Cancer Paternal Grandfather     Arthritis Paternal Grandmother     Stroke Paternal Grandmother     Asthma Daughter     Hearing loss Maternal Uncle     Hypertension Brother     Hypertension Brother      Social History     Socioeconomic History    Marital status:    Tobacco Use    Smoking status: Never     Passive exposure: Never    Smokeless tobacco: Never   Substance and Sexual Activity    Alcohol use: Yes     Comment: Maybe wine at dinner 1x per week, maybe bourbon 1 night p/wk    Drug use: Yes     Frequency: 7.0 times per week     Types: Marijuana     Comment: Sleep aid only for arthritic purposes. Use for 2 months only    Sexual activity: Yes     Partners: Female     Birth control/protection: Post-menopausal     Comment: I have a  vasectomy, she is post menopausal     Social Determinants of Health     Financial Resource Strain: Low Risk  (1/29/2024)    Overall Financial Resource Strain (CARDIA)     Difficulty of Paying Living Expenses: Not very hard   Food Insecurity: No Food Insecurity (1/29/2024)    Hunger Vital Sign     Worried About Running Out of Food in the Last Year: Never true     Ran Out of Food in the Last Year: Never true   Transportation Needs: No Transportation Needs (1/29/2024)    PRAPARE - Transportation     Lack of Transportation (Medical): No     Lack of Transportation (Non-Medical): No   Physical Activity: Inactive (1/29/2024)    Exercise Vital Sign     Days of Exercise per Week: 0 days     Minutes of Exercise per Session: 0 min   Stress: No Stress Concern Present (1/29/2024)    [employer] [place] [employer] - Occupational Stress Questionnaire     Feeling of Stress : Not at all   Recent Concern: Stress - Stress Concern Present (1/18/2024)    [employer] [place] [employer] - Occupational Stress Questionnaire     Feeling of Stress : To some extent   Social Connections: Socially Isolated (1/29/2024)    Social Connection and Isolation Panel [NHANES]     Frequency of Communication with Friends and Family: More than three times a week     Frequency of Social Gatherings with Friends and Family: Three times a week     Attends Judaism Services: Never     Active Member of Clubs or Organizations: No     Attends Club or Organization Meetings: Never     Marital Status:    Housing Stability: Low Risk  (1/29/2024)    Housing Stability Vital Sign     Unable to Pay for Housing in the Last Year: No     Number of Places Lived in the Last Year: 2     Unstable Housing in the Last Year: No       Current Outpatient Medications   Medication Sig Dispense Refill    allopurinoL (ZYLOPRIM) 100 MG tablet Take 200 mg by mouth nightly.      celecoxib (CELEBREX) 200 MG capsule TAKE 1 CAPSULE BY MOUTH ONCE DAILY. 30 capsule 1  "   DULoxetine (CYMBALTA) 20 MG capsule Take 1 capsule (20 mg total) by mouth once daily. (Patient taking differently: Take 20 mg by mouth nightly.) 90 capsule 3    DULoxetine (CYMBALTA) 60 MG capsule Take 1 capsule (60 mg total) by mouth once daily. (Patient taking differently: Take 60 mg by mouth nightly.) 90 capsule 3    HYDROcodone-acetaminophen (NORCO) 7.5-325 mg per tablet Take 1 tablet by mouth every 12 (twelve) hours as needed for Pain. 45 tablet 0    lisinopriL (PRINIVIL,ZESTRIL) 20 MG tablet Take 20 mg by mouth nightly.      metFORMIN (GLUCOPHAGE-XR) 500 MG ER 24hr tablet Take 500 mg by mouth every morning.      multivit-min/ferrous fumarate (MULTI VITAMIN ORAL) Take 1 tablet by mouth once daily.      potassium citrate (UROCIT-K 15) 15 mEq TbSR Take 1 tablet by mouth nightly.       No current facility-administered medications for this visit.     Review of patient's allergies indicates:  No Known Allergies    Review of Systems        Objective:      Vitals:    02/20/24 0814   Weight: (!) 138.7 kg (305 lb 12.5 oz)   Height: 5' 9" (1.753 m)     Physical Exam  Cardiovascular:      Pulses:           Radial pulses are Normal on the right side and Normal on the left side.       Hand/Wrist Musculoskeletal Exam    Inspection    Right      Erythema: none      Ecchymosis: none      Edema: none      Deformity: none      Hand - prior incision comment: I&D Incision Present on the middle phalanx of the ring finger      Incision: clean    Left      Erythema: none      Ecchymosis: none      Edema: none      Deformity: none    Palpation    Palpation additional comments: Non TTP throughout Ring Finger    Range of Motion        Range of motion additional comments: Able to make a composite fist    Neurovascular    Right       Radial pulse: normal      Capillary refill: brisk and <3 sec      Ulnar nerve sensory distribution: normal      Median nerve sensory distribution: normal      Superficial radial nerve sensory distribution: " normal    Left       Radial pulse: normal      Capillary refill: brisk and <3 sec      Ulnar nerve sensory distribution: normal      Median nerve sensory distribution: normal      Superficial radial nerve sensory distribution: normal      Diagnostics Review: X-Ray: Reviewed  1/17/24  R Hand XR  FINDINGS:  Mild DJD.  No fracture or dislocation.  No bone destruction identified       Assessment:       1. Cellulitis of finger of right hand        Plan:       We will continue to monitor patient due to him having a total hip replacement in March.  Reminded patient of cardinal signs of infection and to report to the ED if he has any of those signs.  Patient we will follow up in 2 weeks for a wound check.

## 2024-02-22 DIAGNOSIS — M87.052 AVASCULAR NECROSIS OF LEFT FEMORAL HEAD: Primary | ICD-10-CM

## 2024-02-23 ENCOUNTER — PATIENT MESSAGE (OUTPATIENT)
Dept: ADMINISTRATIVE | Facility: OTHER | Age: 54
End: 2024-02-23
Payer: COMMERCIAL

## 2024-02-23 ENCOUNTER — PATIENT MESSAGE (OUTPATIENT)
Dept: ORTHOPEDICS | Facility: CLINIC | Age: 54
End: 2024-02-23
Payer: COMMERCIAL

## 2024-02-26 ENCOUNTER — PATIENT MESSAGE (OUTPATIENT)
Dept: ADMINISTRATIVE | Facility: OTHER | Age: 54
End: 2024-02-26
Payer: COMMERCIAL

## 2024-02-26 ENCOUNTER — TELEPHONE (OUTPATIENT)
Dept: PREADMISSION TESTING | Facility: HOSPITAL | Age: 54
End: 2024-02-26
Payer: COMMERCIAL

## 2024-02-26 DIAGNOSIS — M79.609 PAIN IN EXTREMITY, UNSPECIFIED EXTREMITY: ICD-10-CM

## 2024-02-26 DIAGNOSIS — Z01.818 PRE-OP TESTING: Primary | ICD-10-CM

## 2024-02-26 NOTE — ANESTHESIA PAT ROS NOTE
02/26/2024  Chris Marc Nava is a 53 y.o., male.      Pre-op Assessment          Review of Systems           Anesthesia Assessment: Preoperative EQUATION    Planned Procedure: Procedure(s) (LRB):  ARTHROPLASTY, HIP, TOTAL, POSTERIOR APPROACH: LEFT: DEPUY - ACTIS + PINNACLE (Left)  Requested Anesthesia Type:Spinal/Epidural  Surgeon: Scot Barrios III, MD  Service: Orthopedics  Known or anticipated Date of Surgery:3/20/2024    Surgeon notes: reviewed    Electronic QUestionnaire Assessment completed via nurse interview with patient.        Triage considerations:     The patient has no apparent active cardiac condition (No unstable coronary Syndrome such as severe unstable angina or recent [<1 month] myocardial infarction, decompensated CHF, severe valvular   disease or significant arrhythmia)    Previous anesthesia records:Not available    Last PCP note: 3-6 months ago , within [facility]   Subspecialty notes: Infectious Disease, Ortho, Pain Management, Hand Surgery    Other important co-morbidities: Morbid Obesity and MSSA R Ring Finger 2/6/24 (antibiotics complete), Mod L3-4 Stenosis       Tests already available:  Available tests,  within [facility] .   1/24/22 Lumbar MRI  1/19/24 CMP. CBC  10/11/23 A1C            Instructions given. (See in Nurse's note)    Optimization:  Anesthesia Preop Clinic Assessment  Indicated    Medical Opinion Indicated: Hand Surgery       Sub-specialist consult indicated:   TBD       Plan:    Testing:  CMP, EKG, Hematology Profile, and PT/INR   Pre-anesthesia  visit       Visit focus: possible regional anesthesia and/or nerve block      Consultation: PCC NP for medical and anesthesia optimization     Patient  has previously scheduled Medical Appointment: 3/1, 3/5    Navigation: Tests Scheduled.              Consults scheduled.             Results will be tracked by Preop  Clinic.                  2/20/24 Hand Surgery:  We will continue to monitor patient due to him having a total hip replacement in March. Reminded patient of cardinal signs of infection and to report to the ED if he has any of those signs. Patient we will follow up in 2 weeks for a wound check.

## 2024-02-29 DIAGNOSIS — M87.052 AVASCULAR NECROSIS OF LEFT FEMORAL HEAD: Primary | ICD-10-CM

## 2024-03-01 ENCOUNTER — TELEPHONE (OUTPATIENT)
Dept: ORTHOPEDICS | Facility: CLINIC | Age: 54
End: 2024-03-01
Payer: COMMERCIAL

## 2024-03-04 ENCOUNTER — PATIENT MESSAGE (OUTPATIENT)
Dept: ADMINISTRATIVE | Facility: OTHER | Age: 54
End: 2024-03-04
Payer: COMMERCIAL

## 2024-03-05 ENCOUNTER — TELEPHONE (OUTPATIENT)
Dept: INTERNAL MEDICINE | Facility: CLINIC | Age: 54
End: 2024-03-05
Payer: COMMERCIAL

## 2024-03-05 NOTE — ASSESSMENT & PLAN NOTE
Patient would benefit from weight loss and has set goals to achieve success.   Lifestyle changes should be made by eating healthy, exercising at least 150 minutes weekly, and avoiding sedentary behavior.     Patient has lost 100 pounds

## 2024-03-06 ENCOUNTER — OFFICE VISIT (OUTPATIENT)
Dept: INTERNAL MEDICINE | Facility: CLINIC | Age: 54
End: 2024-03-06
Payer: COMMERCIAL

## 2024-03-06 ENCOUNTER — LAB VISIT (OUTPATIENT)
Dept: LAB | Facility: HOSPITAL | Age: 54
End: 2024-03-06
Attending: ANESTHESIOLOGY
Payer: COMMERCIAL

## 2024-03-06 ENCOUNTER — HOSPITAL ENCOUNTER (OUTPATIENT)
Dept: CARDIOLOGY | Facility: CLINIC | Age: 54
Discharge: HOME | End: 2024-03-06
Payer: COMMERCIAL

## 2024-03-06 VITALS
SYSTOLIC BLOOD PRESSURE: 132 MMHG | BODY MASS INDEX: 46.04 KG/M2 | HEIGHT: 69 IN | TEMPERATURE: 98 F | DIASTOLIC BLOOD PRESSURE: 78 MMHG | WEIGHT: 310.88 LBS | OXYGEN SATURATION: 95 % | HEART RATE: 77 BPM

## 2024-03-06 DIAGNOSIS — L02.511 ABSCESS OF FINGER OF RIGHT HAND: Chronic | ICD-10-CM

## 2024-03-06 DIAGNOSIS — Z01.818 PRE-OP TESTING: ICD-10-CM

## 2024-03-06 DIAGNOSIS — M79.609 PAIN IN EXTREMITY, UNSPECIFIED EXTREMITY: ICD-10-CM

## 2024-03-06 DIAGNOSIS — I10 HYPERTENSION, UNSPECIFIED TYPE: ICD-10-CM

## 2024-03-06 DIAGNOSIS — E66.01 MORBID OBESITY WITH BMI OF 45.0-49.9, ADULT: ICD-10-CM

## 2024-03-06 DIAGNOSIS — M87.052 AVASCULAR NECROSIS OF BONE OF LEFT HIP: ICD-10-CM

## 2024-03-06 DIAGNOSIS — E66.01 CLASS 3 SEVERE OBESITY WITHOUT SERIOUS COMORBIDITY WITH BODY MASS INDEX (BMI) OF 45.0 TO 49.9 IN ADULT, UNSPECIFIED OBESITY TYPE: Primary | ICD-10-CM

## 2024-03-06 DIAGNOSIS — G47.33 OSA (OBSTRUCTIVE SLEEP APNEA): ICD-10-CM

## 2024-03-06 PROBLEM — L03.011 CELLULITIS OF FINGER OF RIGHT HAND: Status: RESOLVED | Noted: 2024-01-17 | Resolved: 2024-03-06

## 2024-03-06 PROBLEM — Z87.442 PERSONAL HISTORY OF KIDNEY STONES: Status: RESOLVED | Noted: 2023-10-11 | Resolved: 2024-03-06

## 2024-03-06 LAB
ACID FAST MOD KINY STN SPEC: NORMAL
ALBUMIN SERPL BCP-MCNC: 3.9 G/DL (ref 3.5–5.2)
ALP SERPL-CCNC: 73 U/L (ref 55–135)
ALT SERPL W/O P-5'-P-CCNC: 26 U/L (ref 10–44)
ANION GAP SERPL CALC-SCNC: 9 MMOL/L (ref 8–16)
AST SERPL-CCNC: 20 U/L (ref 10–40)
BASOPHILS # BLD AUTO: 0.05 K/UL (ref 0–0.2)
BASOPHILS NFR BLD: 0.6 % (ref 0–1.9)
BILIRUB SERPL-MCNC: 0.7 MG/DL (ref 0.1–1)
BUN SERPL-MCNC: 20 MG/DL (ref 6–20)
CALCIUM SERPL-MCNC: 9.2 MG/DL (ref 8.7–10.5)
CHLORIDE SERPL-SCNC: 103 MMOL/L (ref 95–110)
CO2 SERPL-SCNC: 25 MMOL/L (ref 23–29)
CREAT SERPL-MCNC: 0.8 MG/DL (ref 0.5–1.4)
DIFFERENTIAL METHOD BLD: NORMAL
EOSINOPHIL # BLD AUTO: 0.2 K/UL (ref 0–0.5)
EOSINOPHIL NFR BLD: 2.7 % (ref 0–8)
ERYTHROCYTE [DISTWIDTH] IN BLOOD BY AUTOMATED COUNT: 14 % (ref 11.5–14.5)
EST. GFR  (NO RACE VARIABLE): >60 ML/MIN/1.73 M^2
GLUCOSE SERPL-MCNC: 94 MG/DL (ref 70–110)
HCT VFR BLD AUTO: 50.8 % (ref 40–54)
HGB BLD-MCNC: 16.4 G/DL (ref 14–18)
IMM GRANULOCYTES # BLD AUTO: 0.03 K/UL (ref 0–0.04)
IMM GRANULOCYTES NFR BLD AUTO: 0.4 % (ref 0–0.5)
INR PPP: 1.1 (ref 0.8–1.2)
LYMPHOCYTES # BLD AUTO: 2.2 K/UL (ref 1–4.8)
LYMPHOCYTES NFR BLD: 26.4 % (ref 18–48)
MCH RBC QN AUTO: 27.5 PG (ref 27–31)
MCHC RBC AUTO-ENTMCNC: 32.3 G/DL (ref 32–36)
MCV RBC AUTO: 85 FL (ref 82–98)
MONOCYTES # BLD AUTO: 0.7 K/UL (ref 0.3–1)
MONOCYTES NFR BLD: 8.5 % (ref 4–15)
MYCOBACTERIUM SPEC QL CULT: NORMAL
NEUTROPHILS # BLD AUTO: 5 K/UL (ref 1.8–7.7)
NEUTROPHILS NFR BLD: 61.4 % (ref 38–73)
NRBC BLD-RTO: 0 /100 WBC
OHS QRS DURATION: 106 MS
OHS QTC CALCULATION: 435 MS
PLATELET # BLD AUTO: 260 K/UL (ref 150–450)
PMV BLD AUTO: 9.5 FL (ref 9.2–12.9)
POTASSIUM SERPL-SCNC: 4 MMOL/L (ref 3.5–5.1)
PROT SERPL-MCNC: 7.1 G/DL (ref 6–8.4)
PROTHROMBIN TIME: 11.6 SEC (ref 9–12.5)
RBC # BLD AUTO: 5.96 M/UL (ref 4.6–6.2)
SODIUM SERPL-SCNC: 137 MMOL/L (ref 136–145)
WBC # BLD AUTO: 8.21 K/UL (ref 3.9–12.7)

## 2024-03-06 PROCEDURE — 3008F BODY MASS INDEX DOCD: CPT | Mod: CPTII,S$GLB,, | Performed by: NURSE PRACTITIONER

## 2024-03-06 PROCEDURE — 93005 ELECTROCARDIOGRAM TRACING: CPT | Mod: S$GLB,,, | Performed by: ANESTHESIOLOGY

## 2024-03-06 PROCEDURE — 80053 COMPREHEN METABOLIC PANEL: CPT | Performed by: ANESTHESIOLOGY

## 2024-03-06 PROCEDURE — 1159F MED LIST DOCD IN RCRD: CPT | Mod: CPTII,S$GLB,, | Performed by: NURSE PRACTITIONER

## 2024-03-06 PROCEDURE — 3078F DIAST BP <80 MM HG: CPT | Mod: CPTII,S$GLB,, | Performed by: NURSE PRACTITIONER

## 2024-03-06 PROCEDURE — 4010F ACE/ARB THERAPY RXD/TAKEN: CPT | Mod: CPTII,S$GLB,, | Performed by: NURSE PRACTITIONER

## 2024-03-06 PROCEDURE — 3075F SYST BP GE 130 - 139MM HG: CPT | Mod: CPTII,S$GLB,, | Performed by: NURSE PRACTITIONER

## 2024-03-06 PROCEDURE — 93010 ELECTROCARDIOGRAM REPORT: CPT | Mod: S$GLB,,, | Performed by: INTERNAL MEDICINE

## 2024-03-06 PROCEDURE — 36415 COLL VENOUS BLD VENIPUNCTURE: CPT | Performed by: ANESTHESIOLOGY

## 2024-03-06 PROCEDURE — 99999 PR PBB SHADOW E&M-EST. PATIENT-LVL IV: CPT | Mod: PBBFAC,,, | Performed by: NURSE PRACTITIONER

## 2024-03-06 PROCEDURE — 99215 OFFICE O/P EST HI 40 MIN: CPT | Mod: S$GLB,,, | Performed by: NURSE PRACTITIONER

## 2024-03-06 PROCEDURE — 85025 COMPLETE CBC W/AUTO DIFF WBC: CPT | Performed by: ANESTHESIOLOGY

## 2024-03-06 PROCEDURE — 85610 PROTHROMBIN TIME: CPT | Performed by: ANESTHESIOLOGY

## 2024-03-06 NOTE — PROGRESS NOTES
[facility]  Progress Note    Patient Name: Chris Nava  MRN: [id number]  Date of Evaluation- 03/08/2024  PCP- Shira Rodríguez NP    Future cases for Chris Nava [[id number]]       Case ID Status Date Time Bhaskar Procedure Provider Location    [id number] [facility] 3/20/2024  7:00  ARTHROPLASTY, HIP, TOTAL, POSTERIOR APPROACH: LEFT: DEPUY - ACTIS + Scot Tse III, MD [[id number]] [facility]            HPI:  This is a 53 y.o. 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   Patient is new to me.    The history has been obtained by speaking with the patient and reviewing the electronic medical record and/or outside health information. Significant health conditions for the perioperative period are discussed below in assessment and plan.     Patient reports current health status to be Good.  Denies any new symptoms before surgery.       Subjective/ Objective:     Chief Complaint: Preoperative evaulation, perioperative medical management, and complication reduction plan.     Functional Capacity:  Able to climb a flight of stairs without CP SOB or Syncope.  Able to meet 4 METs        Anesthesia issues: None    Difficulty mouth opening:N    Steroid use in the last 12 months: N     Dental Issues:N    Family anesthesia difficulty: None     Family Hx of Thrombosis N    Past Medical History:   Diagnosis Date    Abscess of finger of right hand 01/18/2024    Arthritis     Cellulitis of finger of right hand 01/17/2024    Hypertension     Kidney stones     Personal history of kidney stones 10/11/2023     Past Surgical History:   Procedure Laterality Date    CYSTOSCOPY W/ LASER LITHOTRIPSY      JUSTIN surgery      removed uvula, tonsils and adenoids for JUSTIN    THYROID CYST EXCISION      2017    VASECTOMY       Review of Systems   Constitutional:  Negative for activity change, appetite  "change, chills, diaphoresis, fatigue, fever and unexpected weight change.   HENT:  Negative for congestion, dental problem, drooling, mouth sores, nosebleeds, postnasal drip, rhinorrhea, sinus pressure, sinus pain, sneezing, sore throat, trouble swallowing and voice change.    Eyes:  Negative for photophobia and visual disturbance.        No acute visual changes   Respiratory:  Negative for apnea, cough, choking, chest tightness, shortness of breath, wheezing and stridor.         STOP bang  Score 4    High risk JUSTIN   Cardiovascular:  Negative for chest pain, palpitations and leg swelling.   Gastrointestinal:  Negative for abdominal distention, abdominal pain, anal bleeding, blood in stool, constipation, diarrhea, nausea and vomiting.        No FLD, Hepatitis, Cirrhosis  No BRB or black tarry stool   Endocrine: Negative for cold intolerance, heat intolerance, polydipsia, polyphagia and polyuria.   Genitourinary:  Negative for difficulty urinating, dysuria, frequency, hematuria and urgency.        Nocturia 1   Musculoskeletal:  Positive for arthralgias and gait problem. Negative for back pain, joint swelling, myalgias, neck pain and neck stiffness.   Skin:  Negative for color change, pallor, rash and wound.   Neurological:  Positive for numbness. Negative for dizziness, seizures, syncope, facial asymmetry, light-headedness and headaches.        KEVON NUMBNESS LEGS WHEN STANDING   Psychiatric/Behavioral:  Negative for agitation, behavioral problems, confusion, decreased concentration, dysphoric mood, hallucinations, self-injury, sleep disturbance and suicidal ideas. The patient is not nervous/anxious and is not hyperactive.       VITALS  Visit Vitals  /78 (BP Location: Left arm, Patient Position: Sitting)   Pulse 77   Temp 97.7 °F (36.5 °C) (Oral)   Ht 5' 9" (1.753 m)   Wt (!) 141 kg (310 lb 13.6 oz)   SpO2 95%   BMI 45.90 kg/m²      Physical Exam     Significant Labs:  Lab Results   Component Value Date    WBC " 8.21 03/06/2024    HGB 16.4 03/06/2024    HCT 50.8 03/06/2024     03/06/2024    CHOL 187 10/11/2023    TRIG 80 10/11/2023    HDL 44 10/11/2023    ALT 26 03/06/2024    AST 20 03/06/2024     03/06/2024    K 4.0 03/06/2024     03/06/2024    CREATININE 0.8 03/06/2024    BUN 20 03/06/2024    CO2 25 03/06/2024    TSH 0.898 10/11/2023    PSA 2.3 10/11/2023    INR 1.1 03/06/2024    HGBA1C 5.1 10/11/2023       Diagnostic Studies: No relevant studies.    EKG:   No results found for this or any previous visit.    2D ECHO:  TTE:  No results found for this or any previous visit.    ALBINO:  No results found for this or any previous visit.     Imaging     Active Cardiac Conditions: None      Revised Cardiac Risk Index   High -Risk Surgery  Intraperitoneal; Intrathoracic; suprainguinal vascular Yes- + 1 No- 0   History of Ischemic Heart Disease   (Hx of MI/positive exercise test/current chest pain due to ischemia/use of nitrate therapy/EKG with pathological Q waves) Yes- + 1 No- 0   History of CHF  (Pulmonary edema/bilateral rales or S3 gallop/PND/CXR showing pulmonary vascular redistribution) Yes- + 1 No- 0   History of CVA   (Prior stroke or TIA) Yes- + 1 No- 0   Pre-operative treatment with insulin Yes- + 1 No- 0   Pre-operative creatinine > 2mg/dl Yes- + 1 No- 0   Total:      Risk Status:  Estimated risk of cardiac complications after non-cardiac surgery using the Revised Cardiac Risk Index for Preoperative risk is 3.9 %      ARISCAT (Canet) risk index: Low: 1.6% risk of post-op pulmonary complications.    American Society of Anesthesiologists Physical Status classification (ASA): 3           No further cardiac workup needed prior to surgery.        Preoperative cardiac risk assessment-  The patient does not have any active cardiac conditions . Revised cardiac risk index predictors- ---.Functional capacity is more than 4 Mets. He will be undergoing a Orthopedic procedure that carries a Moderate Risk risk      The estimated risk of the rate of adverse cardiac outcomes      No further cardiac work up is indicated prior to proceeding with the surgery          American Society of Anesthesiologists Physical status classification ( ASA ) class: 3     Postoperative pulmonary complication risk assessment: 1.6     ARISCAT ( Canet) risk index- risk class -  Low, if duration of surgery is under 3 hours, intermediate, if duration of surgery is over 3 hours        Assessment/Plan:     Class 3 severe obesity without serious comorbidity with body mass index (BMI) of 45.0 to 49.9 in adult  Patient would benefit from weight loss and has set goals to achieve success.   Lifestyle changes should be made by eating healthy, exercising at least 150 minutes weekly, and avoiding sedentary behavior.     Patient has lost 100 pounds    Avascular necrosis of bone of left hip  SURGERY 3/20/24    JUSTIN (obstructive sleep apnea)    This client has a possible diagnosis of obstructive sleep apnea (JUSTIN)    STOPBANG score: 4    Instructions were given to avoid the following: sleeping supine, weight gain ,alcoholic beverages , sedative medications, and CNS depressant use as these can all worsen JUSTIN.    Untreated sleep apnea has been shown to increase daytime sleepiness, hypertension, heart disease and stroke which were discussed with the patient at this time    Please use caution with medications that induce respiratory depression in the perioperative period    HTN (hypertension)  Current BP  137/78 today.    Taking: lISINOPRIL    Lifestyle changes to reduce systolic BP:  exercise 30 minutes per day,  5 days per week or 150 minutes weekly; sodium reduction and avoidance of high salt foods such as processed meats, frozen meals and  fast foods.   Keeping a healthy weight/BMI can help with better BP control    BP acceptable for surgery. I recommend monitoring BP during perioperative period as uncontrolled pain can elevate blood pressure.         Abscess of  finger of right hand  WOUND HAS HEALED AND IS CLOSED    Morbid obesity with BMI of 45.0-49.9, adult  Bmi 45.9      Preventive perioperative care    Thromboembolic prophylaxis:  His risk factors for thrombosis include morbid obesity, surgical procedure, and reduced mobility.I suggest  thromboembolic prophylaxis ( mechanical/pharmacological, weighing the risk benefits of pharmacological agent use considering xiang procedural bleeding )  during the perioperative period.I suggested being active in the post operative period.      Postoperative pulmonary complication prophylaxis-Risk factors for post operative pulmonary complications include ASA class >2- I suggest incentive spirometry use, early ambulation, and pain control so as to avoid diaphragmatic splinting  Brush teeth twice per day, oral rinses, sleep with the head of the bed up 30 degrees     Renal complication prophylaxis-Risk factors for renal complications include age and hypertension . I suggest keeping him well hydrated and avoidance/ minimizing the use of  NSAID's,LOPEZ 2 Inhibitors ,IV contrast if possible in the perioperative period.I suggested drinking 2 litre's of water a day      Surgical site Infection Prophylaxis-I  suggest appropriate antibiotic for Prophylaxis against Surgical site infections Shower with Hibiclens in the night before surgery and the morning of surgery        In view of BPH and Spine procedure the patient  is at risk of postoperative urinary retention.  I suggest avoidance / minimizing the of  Benzodiazepines,Anticholinergic medication,antihistamines ( Benadryl) , if possible in the perioperative period. I suggest using the minimum possible use of opioids for the minimum period of time in the perioperative period. Benadryl avoidance suggested      This visit was focused on Preoperative evaluation, Perioperative Medical management, complication reduction plans. I suggest that the patient follows up with primary care or relevant sub  specialists for ongoing health care.    I appreciate the opportunity to be involved in this patients care. Please feel free to contact me if there were any questions about this consultation.    Patient is optimized    This includes face to face time and non-face to face time preparing to see the patient (eg, review of tests), obtaining and/or reviewing separately obtained history, documenting clinical information in the electronic or other health record, independently interpreting results and communicating results to the patient/family/caregiver, or care coordinator.     Robbie Voss NP  Perioperative Medicine  [facility]   Pager [phone]

## 2024-03-06 NOTE — DISCHARGE INSTRUCTIONS
Your surgery has been scheduled for:______3/20/24____________________________________    You should report to:  ____[facility], located on the College side of the first floor of the           [facility] ([phone])  ____[facility], located on the [facility] side of the            Second floor of the [facility] ([phone])  ____3rd Floor [facility] located on the [facility] side of the [facility] [phone]  __X__[facility]: located at [address] [place] [address]. [address]     Please Note   Tell your doctor if you take Aspirin, products containing Aspirin, herbal medications  or blood thinners, such as Coumadin, Ticlid, or Plavix.  (Consult your provider regarding holding or stopping before surgery).  Arrange for someone to drive you home following surgery.  You will not be allowed to leave the surgical facility alone or drive yourself home following sedation and anesthesia.    Before Surgery  Stop taking all herbal medications, vitamins, and supplements 7 days prior to surgery  No Motrin/Advil (Ibuprofen) 7 days before surgery  No Aleve (Naproxen) 7 days before surgery  Stop Taking Asprin, products containing Aspirin __7___days before surgery   No Goody's/BC Powder 7 days before surgery  Refrain from drinking alcoholic beverages for 24 hours before and after surgery  Stop or limit smoking at least 24 hours prior to surgery  You may take Tylenol for pain    Night before Surgery  Do not eat or drink after midnight  Take a shower or bath (shower is recommended).  Bathe with Hibiclens soap or an antibacterial soap from the neck down.  If not supplied by your surgeon, hibiclens soap will need to be purchased over the counter in pharmacy.  Rinse soap off thoroughly.  Shampoo your hair with your regular shampoo    The Day of Surgery  Take another bath or shower with hibiclens  or any antibacterial soap, to reduce the chance of infection.  Take heart and blood pressure medications with a small sip of water, as advised by the perioperative team.  Do not take fluid pills  You may brush your teeth and rinse your mouth, but do not swallow any additional water.   Do not apply perfumes, powder, body lotions or deodorant on the day of surgery.  Nail polish should be removed.  Do not wear makeup or moisturizer  Wear comfortable clothes, such as a button front shirt and loose fitting pants.  Leave all jewelry, including body piercings, and valuables at home.    Bring any devices you will neeed after surgery such as crutches or canes.  If you have sleep apnea, please bring your CPAP machine  In the event that your physical condition changes including the onset of a cold or respiratory illness, or if you have to delay or cancel your surgery, please notify your surgeon.

## 2024-03-06 NOTE — OUTPATIENT SUBJECTIVE & OBJECTIVE
Outpatient Subjective & Objective      Chief Complaint: Preoperative evaulation, perioperative medical management, and complication reduction plan.     Functional Capacity:  Able to climb a flight of stairs without CP SOB or Syncope.  Able to meet 4 METs        Anesthesia issues: None    Difficulty mouth opening:N    Steroid use in the last 12 months: N     Dental Issues:N    Family anesthesia difficulty: None     Family Hx of Thrombosis N    Past Medical History:   Diagnosis Date    Abscess of finger of right hand 01/18/2024    Arthritis     Cellulitis of finger of right hand 01/17/2024    Hypertension     Kidney stones     Personal history of kidney stones 10/11/2023     Past Surgical History:   Procedure Laterality Date    CYSTOSCOPY W/ LASER LITHOTRIPSY      JUSTIN surgery      removed uvula, tonsils and adenoids for JUSTIN    THYROID CYST EXCISION      2017    VASECTOMY       Review of Systems   Constitutional:  Negative for activity change, appetite change, chills, diaphoresis, fatigue, fever and unexpected weight change.   HENT:  Negative for congestion, dental problem, drooling, mouth sores, nosebleeds, postnasal drip, rhinorrhea, sinus pressure, sinus pain, sneezing, sore throat, trouble swallowing and voice change.    Eyes:  Negative for photophobia and visual disturbance.        No acute visual changes   Respiratory:  Negative for apnea, cough, choking, chest tightness, shortness of breath, wheezing and stridor.         STOP bang  Score 4    High risk JUSTIN   Cardiovascular:  Negative for chest pain, palpitations and leg swelling.   Gastrointestinal:  Negative for abdominal distention, abdominal pain, anal bleeding, blood in stool, constipation, diarrhea, nausea and vomiting.        No FLD, Hepatitis, Cirrhosis  No BRB or black tarry stool   Endocrine: Negative for cold intolerance, heat intolerance, polydipsia, polyphagia and polyuria.   Genitourinary:  Negative for difficulty urinating, dysuria, frequency,  "hematuria and urgency.        Nocturia 1   Musculoskeletal:  Positive for arthralgias and gait problem. Negative for back pain, joint swelling, myalgias, neck pain and neck stiffness.   Skin:  Negative for color change, pallor, rash and wound.   Neurological:  Positive for numbness. Negative for dizziness, seizures, syncope, facial asymmetry, light-headedness and headaches.        KEVON NUMBNESS LEGS WHEN STANDING   Psychiatric/Behavioral:  Negative for agitation, behavioral problems, confusion, decreased concentration, dysphoric mood, hallucinations, self-injury, sleep disturbance and suicidal ideas. The patient is not nervous/anxious and is not hyperactive.       VITALS  Visit Vitals  /78 (BP Location: Left arm, Patient Position: Sitting)   Pulse 77   Temp 97.7 °F (36.5 °C) (Oral)   Ht 5' 9" (1.753 m)   Wt (!) 141 kg (310 lb 13.6 oz)   SpO2 95%   BMI 45.90 kg/m²      Physical Exam  Constitutional:       General: He is not in acute distress.     Appearance: He is well-developed. He is not diaphoretic.   HENT:      Head: Normocephalic.      Right Ear: Hearing normal.      Left Ear: Hearing normal.      Nose: Nose normal.      Mouth/Throat:      Lips: Pink.      Mouth: Mucous membranes are moist.   Eyes:      General: Lids are normal.      Conjunctiva/sclera: Conjunctivae normal.      Pupils: Pupils are equal, round, and reactive to light.   Neck:      Vascular: No carotid bruit.      Trachea: Trachea and phonation normal.   Cardiovascular:      Rate and Rhythm: Normal rate and regular rhythm.      Pulses:           Carotid pulses are 2+ on the right side and 2+ on the left side.       Radial pulses are 2+ on the right side and 2+ on the left side.        Posterior tibial pulses are 2+ on the right side and 2+ on the left side.      Heart sounds: Normal heart sounds. No murmur heard.     No friction rub. No gallop.   Pulmonary:      Effort: Pulmonary effort is normal.      Breath sounds: Normal breath sounds.     "  Comments: Clear and equal  Anterior and Posterior BBS  Abdominal:      General: Abdomen is protuberant. Bowel sounds are normal. There is no distension.      Palpations: Abdomen is soft.      Tenderness: There is no abdominal tenderness.   Musculoskeletal:         General: No tenderness or deformity. Normal range of motion.      Cervical back: Normal range of motion.      Right lower leg: No edema.      Left lower leg: No edema.   Lymphadenopathy:      Head:      Right side of head: No submental, submandibular, tonsillar, preauricular, posterior auricular or occipital adenopathy.      Left side of head: No submental, submandibular, tonsillar, preauricular, posterior auricular or occipital adenopathy.      Cervical:      Right cervical: No superficial cervical adenopathy.     Left cervical: No superficial cervical adenopathy.   Skin:     General: Skin is warm and dry.      Capillary Refill: Capillary refill takes 2 to 3 seconds.      Coloration: Skin is not pale.      Findings: No erythema or rash.   Neurological:      Mental Status: He is alert and oriented to person, place, and time.      GCS: GCS eye subscore is 4. GCS verbal subscore is 5. GCS motor subscore is 6.      Motor: No abnormal muscle tone.      Coordination: Coordination normal.   Psychiatric:         Attention and Perception: Attention and perception normal.         Mood and Affect: Mood and affect normal.         Speech: Speech normal.         Behavior: Behavior normal. Behavior is cooperative.         Thought Content: Thought content normal.         Cognition and Memory: Cognition and memory normal.         Judgment: Judgment normal.          Significant Labs:  Lab Results   Component Value Date    WBC 8.21 03/06/2024    HGB 16.4 03/06/2024    HCT 50.8 03/06/2024     03/06/2024    CHOL 187 10/11/2023    TRIG 80 10/11/2023    HDL 44 10/11/2023    ALT 26 03/06/2024    AST 20 03/06/2024     03/06/2024    K 4.0 03/06/2024      03/06/2024    CREATININE 0.8 03/06/2024    BUN 20 03/06/2024    CO2 25 03/06/2024    TSH 0.898 10/11/2023    PSA 2.3 10/11/2023    INR 1.1 03/06/2024    HGBA1C 5.1 10/11/2023       Diagnostic Studies: No relevant studies.    EKG:   No results found for this or any previous visit.    2D ECHO:  TTE:  No results found for this or any previous visit.    ALBINO:  No results found for this or any previous visit.     Imaging     Active Cardiac Conditions: None      Revised Cardiac Risk Index   High -Risk Surgery  Intraperitoneal; Intrathoracic; suprainguinal vascular Yes- + 1 No- 0   History of Ischemic Heart Disease   (Hx of MI/positive exercise test/current chest pain due to ischemia/use of nitrate therapy/EKG with pathological Q waves) Yes- + 1 No- 0   History of CHF  (Pulmonary edema/bilateral rales or S3 gallop/PND/CXR showing pulmonary vascular redistribution) Yes- + 1 No- 0   History of CVA   (Prior stroke or TIA) Yes- + 1 No- 0   Pre-operative treatment with insulin Yes- + 1 No- 0   Pre-operative creatinine > 2mg/dl Yes- + 1 No- 0   Total:      Risk Status:  Estimated risk of cardiac complications after non-cardiac surgery using the Revised Cardiac Risk Index for Preoperative risk is 3.9 %      ARISCAT (Canet) risk index: Low: 1.6% risk of post-op pulmonary complications.    American Society of Anesthesiologists Physical Status classification (ASA): 3           No further cardiac workup needed prior to surgery.    Outpatient Subjective & Objective

## 2024-03-06 NOTE — HPI
This is a 53 y.o. 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   Patient is new to me.    The history has been obtained by speaking with the patient and reviewing the electronic medical record and/or outside health information. Significant health conditions for the perioperative period are discussed below in assessment and plan.     Patient reports current health status to be Good.  Denies any new symptoms before surgery.

## 2024-03-06 NOTE — ASSESSMENT & PLAN NOTE
Current BP  137/78 today.    Taking: lISINOPRIL    Lifestyle changes to reduce systolic BP:  exercise 30 minutes per day,  5 days per week or 150 minutes weekly; sodium reduction and avoidance of high salt foods such as processed meats, frozen meals and  fast foods.   Keeping a healthy weight/BMI can help with better BP control    BP acceptable for surgery. I recommend monitoring BP during perioperative period as uncontrolled pain can elevate blood pressure.

## 2024-03-06 NOTE — ASSESSMENT & PLAN NOTE
This client has a possible diagnosis of obstructive sleep apnea (JUSTIN)    STOPBANG score: 4    Instructions were given to avoid the following: sleeping supine, weight gain ,alcoholic beverages , sedative medications, and CNS depressant use as these can all worsen JUSTIN.    Untreated sleep apnea has been shown to increase daytime sleepiness, hypertension, heart disease and stroke which were discussed with the patient at this time    Please use caution with medications that induce respiratory depression in the perioperative period

## 2024-03-11 ENCOUNTER — PATIENT MESSAGE (OUTPATIENT)
Dept: ADMINISTRATIVE | Facility: OTHER | Age: 54
End: 2024-03-11
Payer: COMMERCIAL

## 2024-03-11 ENCOUNTER — CLINICAL SUPPORT (OUTPATIENT)
Dept: ORTHOPEDICS | Facility: CLINIC | Age: 54
End: 2024-03-11
Payer: COMMERCIAL

## 2024-03-11 DIAGNOSIS — M16.12 PRIMARY OSTEOARTHRITIS OF LEFT HIP: Primary | ICD-10-CM

## 2024-03-11 PROCEDURE — 99499 UNLISTED E&M SERVICE: CPT | Mod: S$GLB,,, | Performed by: ORTHOPAEDIC SURGERY

## 2024-03-12 ENCOUNTER — OFFICE VISIT (OUTPATIENT)
Dept: ORTHOPEDICS | Facility: CLINIC | Age: 54
End: 2024-03-12
Payer: COMMERCIAL

## 2024-03-12 ENCOUNTER — HOSPITAL ENCOUNTER (OUTPATIENT)
Dept: RADIOLOGY | Facility: HOSPITAL | Age: 54
Discharge: HOME | End: 2024-03-12
Attending: ORTHOPAEDIC SURGERY
Payer: COMMERCIAL

## 2024-03-12 VITALS — BODY MASS INDEX: 45.16 KG/M2 | HEIGHT: 69 IN | WEIGHT: 304.88 LBS

## 2024-03-12 VITALS — WEIGHT: 305 LBS | BODY MASS INDEX: 45.18 KG/M2 | HEIGHT: 69 IN

## 2024-03-12 DIAGNOSIS — M87.052 AVASCULAR NECROSIS OF LEFT FEMORAL HEAD: Primary | ICD-10-CM

## 2024-03-12 DIAGNOSIS — Z96.642 S/P TOTAL LEFT HIP ARTHROPLASTY: ICD-10-CM

## 2024-03-12 DIAGNOSIS — M87.052 AVASCULAR NECROSIS OF LEFT FEMORAL HEAD: ICD-10-CM

## 2024-03-12 PROCEDURE — 99499 UNLISTED E&M SERVICE: CPT | Mod: S$GLB,,, | Performed by: ORTHOPAEDIC SURGERY

## 2024-03-12 PROCEDURE — 99214 OFFICE O/P EST MOD 30 MIN: CPT | Mod: S$GLB,,,

## 2024-03-12 PROCEDURE — 99999 PR PBB SHADOW E&M-EST. PATIENT-LVL III: CPT | Mod: PBBFAC,,,

## 2024-03-12 PROCEDURE — 1160F RVW MEDS BY RX/DR IN RCRD: CPT | Mod: CPTII,S$GLB,,

## 2024-03-12 PROCEDURE — 3008F BODY MASS INDEX DOCD: CPT | Mod: CPTII,S$GLB,,

## 2024-03-12 PROCEDURE — 1159F MED LIST DOCD IN RCRD: CPT | Mod: CPTII,S$GLB,,

## 2024-03-12 PROCEDURE — 72170 X-RAY EXAM OF PELVIS: CPT | Mod: TC

## 2024-03-12 PROCEDURE — 4010F ACE/ARB THERAPY RXD/TAKEN: CPT | Mod: CPTII,S$GLB,,

## 2024-03-12 PROCEDURE — 99999 PR PBB SHADOW E&M-EST. PATIENT-LVL III: CPT | Mod: PBBFAC,,, | Performed by: ORTHOPAEDIC SURGERY

## 2024-03-12 PROCEDURE — 72170 X-RAY EXAM OF PELVIS: CPT | Mod: 26,,, | Performed by: RADIOLOGY

## 2024-03-12 RX ORDER — PREGABALIN 75 MG/1
75 CAPSULE ORAL NIGHTLY
Status: CANCELLED | OUTPATIENT
Start: 2024-03-12

## 2024-03-12 RX ORDER — CELECOXIB 200 MG/1
400 CAPSULE ORAL ONCE
Status: CANCELLED | OUTPATIENT
Start: 2024-03-12 | End: 2024-03-12

## 2024-03-12 RX ORDER — SODIUM CHLORIDE 9 MG/ML
INJECTION, SOLUTION INTRAVENOUS CONTINUOUS
Status: CANCELLED | OUTPATIENT
Start: 2024-03-12 | End: 2024-03-13

## 2024-03-12 RX ORDER — METHOCARBAMOL 750 MG/1
750 TABLET, FILM COATED ORAL 3 TIMES DAILY
Status: CANCELLED | OUTPATIENT
Start: 2024-03-12

## 2024-03-12 RX ORDER — MUPIROCIN 20 MG/G
1 OINTMENT TOPICAL
Status: CANCELLED | OUTPATIENT
Start: 2024-03-12

## 2024-03-12 RX ORDER — POLYETHYLENE GLYCOL 3350 17 G/17G
17 POWDER, FOR SOLUTION ORAL DAILY
Status: CANCELLED | OUTPATIENT
Start: 2024-03-13

## 2024-03-12 RX ORDER — POLYETHYLENE GLYCOL 3350 17 G/17G
17 POWDER, FOR SOLUTION ORAL DAILY PRN
Status: CANCELLED | OUTPATIENT
Start: 2024-03-12

## 2024-03-12 RX ORDER — MIDAZOLAM HYDROCHLORIDE 1 MG/ML
4 INJECTION INTRAMUSCULAR; INTRAVENOUS
Status: CANCELLED | OUTPATIENT
Start: 2024-03-12 | End: 2024-03-13

## 2024-03-12 RX ORDER — LIDOCAINE HYDROCHLORIDE 10 MG/ML
1 INJECTION, SOLUTION EPIDURAL; INFILTRATION; INTRACAUDAL; PERINEURAL
Status: CANCELLED | OUTPATIENT
Start: 2024-03-12

## 2024-03-12 RX ORDER — CEFAZOLIN SODIUM 2 G/50ML
2 SOLUTION INTRAVENOUS
Status: CANCELLED | OUTPATIENT
Start: 2024-03-12 | End: 2024-03-13

## 2024-03-12 RX ORDER — FENTANYL CITRATE 50 UG/ML
100 INJECTION, SOLUTION INTRAMUSCULAR; INTRAVENOUS
Status: CANCELLED | OUTPATIENT
Start: 2024-03-12 | End: 2024-03-13

## 2024-03-12 RX ORDER — AMOXICILLIN 250 MG
1 CAPSULE ORAL 2 TIMES DAILY
Status: CANCELLED | OUTPATIENT
Start: 2024-03-12

## 2024-03-12 RX ORDER — SODIUM CHLORIDE 9 MG/ML
INJECTION, SOLUTION INTRAVENOUS
Status: CANCELLED | OUTPATIENT
Start: 2024-03-12

## 2024-03-12 RX ORDER — MORPHINE SULFATE 2 MG/ML
2 INJECTION, SOLUTION INTRAMUSCULAR; INTRAVENOUS
Status: CANCELLED | OUTPATIENT
Start: 2024-03-12

## 2024-03-12 RX ORDER — ACETAMINOPHEN 500 MG
1000 TABLET ORAL
Status: CANCELLED | OUTPATIENT
Start: 2024-03-12

## 2024-03-12 RX ORDER — ASPIRIN 81 MG/1
81 TABLET ORAL 2 TIMES DAILY
Status: CANCELLED | OUTPATIENT
Start: 2024-03-12

## 2024-03-12 RX ORDER — NALOXONE HCL 0.4 MG/ML
0.02 VIAL (ML) INJECTION
Status: CANCELLED | OUTPATIENT
Start: 2024-03-12

## 2024-03-12 RX ORDER — CELECOXIB 200 MG/1
200 CAPSULE ORAL DAILY
Status: CANCELLED | OUTPATIENT
Start: 2024-03-13

## 2024-03-12 RX ORDER — PREGABALIN 75 MG/1
75 CAPSULE ORAL
Status: CANCELLED | OUTPATIENT
Start: 2024-03-12

## 2024-03-12 RX ORDER — OXYCODONE HYDROCHLORIDE 5 MG/1
10 TABLET ORAL
Status: CANCELLED | OUTPATIENT
Start: 2024-03-12

## 2024-03-12 RX ORDER — ONDANSETRON HYDROCHLORIDE 2 MG/ML
4 INJECTION, SOLUTION INTRAVENOUS EVERY 8 HOURS PRN
Status: CANCELLED | OUTPATIENT
Start: 2024-03-12

## 2024-03-12 RX ORDER — MUPIROCIN 20 MG/G
1 OINTMENT TOPICAL 2 TIMES DAILY
Status: CANCELLED | OUTPATIENT
Start: 2024-03-12 | End: 2024-03-17

## 2024-03-12 RX ORDER — OXYCODONE HYDROCHLORIDE 5 MG/1
5 TABLET ORAL
Status: CANCELLED | OUTPATIENT
Start: 2024-03-12

## 2024-03-12 RX ORDER — ACETAMINOPHEN 500 MG
1000 TABLET ORAL EVERY 6 HOURS
Status: CANCELLED | OUTPATIENT
Start: 2024-03-12

## 2024-03-12 RX ORDER — FENTANYL CITRATE 50 UG/ML
25 INJECTION, SOLUTION INTRAMUSCULAR; INTRAVENOUS EVERY 5 MIN PRN
Status: CANCELLED | OUTPATIENT
Start: 2024-03-12

## 2024-03-12 RX ORDER — PROCHLORPERAZINE EDISYLATE 5 MG/ML
5 INJECTION INTRAMUSCULAR; INTRAVENOUS EVERY 6 HOURS PRN
Status: CANCELLED | OUTPATIENT
Start: 2024-03-12

## 2024-03-12 RX ORDER — FAMOTIDINE 20 MG/1
20 TABLET, FILM COATED ORAL 2 TIMES DAILY
Status: CANCELLED | OUTPATIENT
Start: 2024-03-12

## 2024-03-12 NOTE — PROGRESS NOTES
Chris Nava is a 53 y.o. year old here today for pre surgery optimization visit  in preparation for a Left total hip arthroplasty to be performed by Dr. Barrios  on 3/20/24.  he was last seen and treated in the clinic on 3/12/2024. he will be medically optimized by the pre op center. There has been no significant change in medical status since last visit. No fever, chills, malaise, or unexplained weight change.      Allergies, Medications, past medical and surgical history reviewed.    Focused examination performed.    Patient saw surgeon in clinic today. All questions answered. Patient encouraged to call with questions. Contact information given.     Pre, xiang, and post operative procedures and expectations discussed. Goals of successful surgery reviewed and include manageable pain levels, surgical site free of infection, medication management, and ambulation with PT/OT assistance. Healthy weight management discussed with patient and caregiver who were receptive to eduction of healthy diet and activity. No other necessary lifestyle changes identified. Educated patient about signs and symptoms of infection, medication management, anticoagulation therapy, risk of tobacco and alcohol use, and self-care to promote healing. Surgical guide given for future reference. Hibiclens given to patient with instructions. All questions were answered.     Chris Nava verbalized an understanding to the education and goals. Patient has displayed readiness to engage in care and is ready to proceed with surgery.  Patient reports significant other is able and ready to provide assistance at home after discharge.    Surgical and blood consents signed.    DME will be arranged. The mobility limitation cannot be sufficiently resolved by the use of a cane. Patient's functional mobility deficit can be sufficiently resolved with the use of a (Rolling Walker or Walker). Patient's mobility limitation significantly impairs  "their ability to participate in one of more activities of daily living. The use of a (Rolling Walker or Walker) will significantly improve the patient's ability to participate in MRADLS and the patient will use it on regular basis in the home."     Chrisjeyson Nava will contact us if there are any questions, concerns, or changes in medical status prior to surgery.       Joint class:  03/11/2024    Patient has discussed discharge planning with surgeon. Patient will be discharged to home following surgery.   patient will be scheduled with Home Health during hospitalization.     30 minutes of time was spent on patient education, review of records, templating, H&P, , appointment scheduling and optimizing patient for surgery.      "

## 2024-03-12 NOTE — H&P
CC:  Left hip pain    Chris Nava is a 53 y.o. male with Left hip pain.  Pain is worse with activity and weight bearing.  Patient has experienced interference of activities of daily living due to increased pain and decreased range of motion. Patient has failed non-operative treatment including NSAIDs, as well as greater than 3 months of activity modification. Chris Nava ambulates using assistive device .     Relevant medical conditions of significance in perioperative period:  HTN- on medication managed by PCP  JUSTIN- monitored by PCP       Given documented medical comorbidities including those listed above and based off of CMS criteria patient would meet inpatient admission status guidelines. This case has been approved as inpatient.     Past Medical History:   Diagnosis Date    Abscess of finger of right hand 01/18/2024    Arthritis     Cellulitis of finger of right hand 01/17/2024    Hypertension     Kidney stones     Personal history of kidney stones 10/11/2023       Past Surgical History:   Procedure Laterality Date    CYSTOSCOPY W/ LASER LITHOTRIPSY      JUSTIN surgery      removed uvula, tonsils and adenoids for JUSTIN    THYROID CYST EXCISION      2017    VASECTOMY         Family History   Problem Relation Age of Onset    Arthritis Father     Cancer Father     Hearing loss Father     Hypertension Father     Hypertension Brother     Hypertension Brother     Asthma Daughter     Hearing loss Maternal Uncle     Cancer Maternal Grandmother     Arthritis Paternal Grandmother     Stroke Paternal Grandmother     Cancer Paternal Grandfather        Review of patient's allergies indicates:  No Known Allergies      Current Outpatient Medications:     allopurinoL (ZYLOPRIM) 100 MG tablet, Take 200 mg by mouth nightly., Disp: , Rfl:     celecoxib (CELEBREX) 200 MG capsule, TAKE 1 CAPSULE BY MOUTH ONCE DAILY., Disp: 30 capsule, Rfl: 1    DULoxetine (CYMBALTA) 20 MG capsule, Take 1 capsule (20 mg total) by  "mouth once daily. (Patient taking differently: Take 20 mg by mouth nightly.), Disp: 90 capsule, Rfl: 3    DULoxetine (CYMBALTA) 60 MG capsule, Take 1 capsule (60 mg total) by mouth once daily. (Patient taking differently: Take 60 mg by mouth nightly.), Disp: 90 capsule, Rfl: 3    lisinopriL (PRINIVIL,ZESTRIL) 20 MG tablet, Take 20 mg by mouth nightly., Disp: , Rfl:     metFORMIN (GLUCOPHAGE-XR) 500 MG ER 24hr tablet, Take 500 mg by mouth every morning., Disp: , Rfl:     multivit-min/ferrous fumarate (MULTI VITAMIN ORAL), Take 1 tablet by mouth once daily., Disp: , Rfl:     potassium citrate (UROCIT-K 15) 15 mEq TbSR, Take 1 tablet by mouth nightly., Disp: , Rfl:     Review of Systems:   Constitutional: no fever or chills  Eyes: no visual changes  ENT: no nasal congestion or sore throat  Respiratory: no cough or shortness of breath  Cardiovascular: no chest pain or palpitations  Gastrointestinal: no nausea or vomiting, tolerating diet  Genitourinary: no hematuria or dysuria  Integument/Breast: no rash or pruritis  Hematologic/Lymphatic: no easy bruising or lymphadenopathy  Musculoskeletal: positive for hip pain  Neurological: no seizures or tremors  Behavioral/Psych: no auditory or visual hallucinations  Endocrine: no heat or cold intolerance    PE:  Ht 5' 9" (1.753 m)   Wt (!) 138.3 kg (304 lb 14.3 oz)   BMI 45.03 kg/m²   General: Pleasant, cooperative, NAD   Gait: antalgic  HEENT: NCAT, sclera nonicteric   Lungs: Respirations are clear, equal and unlabored.   CV: S1S2; 2+ bilateral upper and lower extremity pulses.   Skin: Intact throughout LE with no rashes, erythema, or lesions  Extremities: No LE edema, NVI lower extremities    Left Hip Exam:  70 degrees flexion  0 degrees extension   5 degrees internal rotation  20 degrees external rotation  15 degrees abduction  15 degrees adduction  There is pain with passive range of motion.     Radiographs: Radiographs reveal advanced degenerative changes including " subchondral cyst formation, subchondral sclerosis, osteophyte formation, joint space narrowing.     Diagnosis: Primary osteoarthritis Left hip    Plan: Left total hip arthroplasty     Due to the serious nature of total joint infection and the high prevalence of community acquired MRSA, vancomycin will be used perioperatively.

## 2024-03-12 NOTE — PROGRESS NOTES
[facility]    HOME HEALTH ORDERS  FACE TO FACE ENCOUNTER    Patient Name: Chris Nava  YOB: 1970    PCP: Shira Rodríguez NP   PCP Address: [address] [facility] [place] / [place]  PCP Phone Number: [phone]  PCP Fax: [phone]    Encounter Date: 03/12/2024    Admit to Home Health    Diagnoses:  There are no hospital problems to display for this patient.      Future Appointments   Date Time Provider Department Center   3/22/2024  2:30 PM TELEMED NURSE, [facility] ORTHO [facility]   4/4/2024  2:00 PM Eric Tavares PA-C [facility]   4/24/2024  2:40 PM PRE-ADMIT NURSE 1, [facility]O -[facility] ENDO4 [facility]   5/2/2024 11:40 AM Scot Barrios III, MD [facility]THO [facility]   6/13/2024 11:40 AM Scot Barrios III, MD [facility]           I have seen and examined this patient face to face today. My clinical findings that support the need for the home health skilled services and home bound status are the following:  Weakness/numbness causing balance and gait disturbance due to Joint Replacement making it taxing to leave home.    Allergies:Review of patient's allergies indicates:  No Known Allergies    Diet: regular diet    Activities: activity as tolerated    Home Health Admitting Clinician:   SN/PT to complete comprehensive assessment including routine vital signs. Instruct on disease process and s/s of complications to report to MD. Follow specific home health arthoplasty protocol. Review/verify medication list sent home with the patient at time of discharge  and instruct patient/caregiver as needed. If coumadin ordered, coumadin clinic to manage INR with INR draws 2x per week with a goal to maintain INR between 1.8 and 2.2. Frequency may be adjusted depending on start of care date.    Notify MD if SBP > 160 or < 90; DBP > 90 or < 50; HR > 120 or < 50; Temp > 101    Home Medical  Equipment:  Walker, 3-1 bedside commode, transfer tub bench    CONSULTS:    Physical Therapy may admit if patient not on coumadin, PT to perform comprehensive assessment if performing admit visit and generate therapy plan of care. Evaluate for home safety and equipment needs; Establish/upgrade home exercise program. Perform/instruct on therapeutic exercises, gait training, transfer training, and Range of Motion. Posterior hip precautions, no extremes of motion, flexion 0-90.      OTHER: (only select if patient needs other therapy disciplines)  Occupational Therapy to evaluate and treat. Evaluate home environment for safety and equipment needs. Perform/Instruct on transfers, ADL training, ROM, and therapeutic exercises.        WOUND CARE ORDERS:  Assess Surgical Incision/DSRG each TX  Aquacel AG drsg applied post-op leave on 14 days post op. Call MD if any drainage reaches border to border of drsg horizontally, s/s of infection, temp >101, induration, swelling or redness.  If dressing is removed per MD order, then apply island dressing, change/teach caregiver to perform daily dressing change if island dressing present.    Medications: Review discharge medications with patient and family and provide education.      Cannot display discharge medications since this is not an admission.      I certify that this patient is confined to his home and needs physical therapy and occupational therapy.

## 2024-03-12 NOTE — PROGRESS NOTES
Pre-op L post KAYLA   3/20/24    BMI 45.04    MSSA hand - cleared, off abx, no further issues    Hip exam unchanged  Lateral skin intact  Some persistent spinal stenosis/sciatica symptoms but not as bad/often  Distally 5/5 strength and good sensation    MRI pelvis - R iliac bone island, saw Dr Ramirez    This patient has significant symptoms in their hip that are affecting their quality of life and daily activities.  They have tried non-operative treatment including analgesics, an exercise program, and activity modification, but the symptoms have persisted. I believe they make a good candidate for hip arthroplasty.     The risks and benefits of hip arthroplasty have been discussed with the patient which include, but are not limited to infections (including severe sequelae), potential component failure, fracture, DVT, pulmonary embolus, nerve palsy, dislocation, leg length inequality, persistent pain, wound healing complications, transfusions, medical complications and death.     We discussed surgical options including implant type and why I believe the implant selected is a good match for the patient's needs. The patient expressed understanding and agrees to proceed with the planned surgery.     Pre-operative planning will include the following:  A pre-surgical evaluation will be arranged.  Pre-op orders will be placed.  We will make arrangements with the operating room for proper time and staffing.  We will make Social Service arrangements for the patient.    Approach: Posterior    Implants:   Company: [email or web address]  Cup: [place] DM  Stem: [place]    DVT prophylaxis: ASA 81mg BID x1 month  Dispo: home health PT    Admission status: Outpatient/23hr OBS                Location: [place]                               L post KAYLA  1 night  Fem head perm path - abn marrow change L spine MRI but not pelvis MRI

## 2024-03-15 DIAGNOSIS — Z96.642 S/P HIP REPLACEMENT, LEFT: Primary | ICD-10-CM

## 2024-03-15 RX ORDER — OXYCODONE HYDROCHLORIDE 5 MG/1
TABLET ORAL
Qty: 30 TABLET | Refills: 0 | Status: SHIPPED | OUTPATIENT
Start: 2024-03-15

## 2024-03-15 RX ORDER — AMOXICILLIN 250 MG
1 CAPSULE ORAL DAILY
Qty: 30 TABLET | Refills: 0 | Status: SHIPPED | OUTPATIENT
Start: 2024-03-15

## 2024-03-15 RX ORDER — METHOCARBAMOL 750 MG/1
750 TABLET, FILM COATED ORAL 4 TIMES DAILY PRN
Qty: 40 TABLET | Refills: 0 | Status: SHIPPED | OUTPATIENT
Start: 2024-03-15 | End: 2024-03-31

## 2024-03-15 RX ORDER — CELECOXIB 200 MG/1
200 CAPSULE ORAL DAILY
Qty: 30 CAPSULE | Refills: 0 | Status: SHIPPED | OUTPATIENT
Start: 2024-03-15 | End: 2024-03-27

## 2024-03-15 RX ORDER — CEFADROXIL 500 MG/1
500 CAPSULE ORAL EVERY 12 HOURS
Qty: 14 CAPSULE | Refills: 0 | Status: SHIPPED | OUTPATIENT
Start: 2024-03-15

## 2024-03-15 RX ORDER — ASPIRIN 81 MG/1
81 TABLET ORAL 2 TIMES DAILY
Qty: 60 TABLET | Refills: 0 | Status: SHIPPED | OUTPATIENT
Start: 2024-03-15 | End: 2024-04-20

## 2024-03-15 RX ORDER — DEXTROMETHORPHAN HYDROBROMIDE, GUAIFENESIN 5; 100 MG/5ML; MG/5ML
650 LIQUID ORAL EVERY 8 HOURS
Qty: 120 TABLET | Refills: 0 | Status: SHIPPED | OUTPATIENT
Start: 2024-03-15

## 2024-03-15 RX ORDER — PANTOPRAZOLE SODIUM 40 MG/1
40 TABLET, DELAYED RELEASE ORAL DAILY
Qty: 30 TABLET | Refills: 0 | Status: SHIPPED | OUTPATIENT
Start: 2024-03-15 | End: 2024-04-20

## 2024-03-18 ENCOUNTER — PATIENT MESSAGE (OUTPATIENT)
Dept: ADMINISTRATIVE | Facility: OTHER | Age: 54
End: 2024-03-18
Payer: COMMERCIAL

## 2024-03-18 ENCOUNTER — TELEPHONE (OUTPATIENT)
Dept: ORTHOPEDICS | Facility: CLINIC | Age: 54
End: 2024-03-18
Payer: COMMERCIAL

## 2024-03-18 NOTE — TELEPHONE ENCOUNTER
I called the patient today regarding surgery on 3/20/2024 with Dr. Scot Barrios. I informed the patient that his surgery will be at  [facility] ([facility] [place] [address]). I informed the patient they must arrive at 10:30am and their surgery will start at 12:30pm.     Per the [facility] COVID-19 Pre-Procedural Testing Policy (administered 10/26/2022), patients do not need tested for COVID-19 regardless of vaccination status.    I reminded the patient that they cannot eat or drink after midnight, the night before surgery.      I reminded the patient to be careful of their skin over the next few days to make sure they do not get any cuts, scratches or scrapes.    The patient has not yet received their walker, bedside commode or shower chair from [facility]. I told the patient that she needs to call [facility] today at [phone].    The patient verbalized understanding and has no further questions.

## 2024-03-19 ENCOUNTER — ANESTHESIA EVENT (OUTPATIENT)
Dept: SURGERY | Facility: HOSPITAL | Age: 54
End: 2024-03-19
Payer: COMMERCIAL

## 2024-03-19 DIAGNOSIS — Z96.642 S/P HIP REPLACEMENT, LEFT: Primary | ICD-10-CM

## 2024-03-19 RX ORDER — ROPIVACAINE HYDROCHLORIDE 2 MG/ML
INJECTION, SOLUTION EPIDURAL; INFILTRATION; PERINEURAL CONTINUOUS
Status: CANCELLED | OUTPATIENT
Start: 2024-03-19

## 2024-03-20 ENCOUNTER — ANESTHESIA (OUTPATIENT)
Dept: SURGERY | Facility: HOSPITAL | Age: 54
End: 2024-03-20
Payer: COMMERCIAL

## 2024-03-20 ENCOUNTER — HOSPITAL ENCOUNTER (OUTPATIENT)
Facility: HOSPITAL | Age: 54
Discharge: HOME | End: 2024-03-21
Attending: ORTHOPAEDIC SURGERY | Admitting: ORTHOPAEDIC SURGERY
Payer: COMMERCIAL

## 2024-03-20 DIAGNOSIS — Z96.642 S/P TOTAL LEFT HIP ARTHROPLASTY: ICD-10-CM

## 2024-03-20 DIAGNOSIS — M87.052 AVASCULAR NECROSIS OF LEFT FEMORAL HEAD: ICD-10-CM

## 2024-03-20 LAB
POCT GLUCOSE: 114 MG/DL (ref 70–110)
POCT GLUCOSE: 151 MG/DL (ref 70–110)

## 2024-03-20 PROCEDURE — 99900035 HC TECH TIME PER 15 MIN (STAT)

## 2024-03-20 PROCEDURE — 27201423 OPTIME MED/SURG SUP & DEVICES STERILE SUPPLY: Performed by: ORTHOPAEDIC SURGERY

## 2024-03-20 PROCEDURE — 71000033 HC RECOVERY, INTIAL HOUR: Performed by: ORTHOPAEDIC SURGERY

## 2024-03-20 PROCEDURE — 25000003 PHARM REV CODE 250

## 2024-03-20 PROCEDURE — C1713 ANCHOR/SCREW BN/BN,TIS/BN: HCPCS | Performed by: ORTHOPAEDIC SURGERY

## 2024-03-20 PROCEDURE — 63600175 PHARM REV CODE 636 W HCPCS: Performed by: ANESTHESIOLOGY

## 2024-03-20 PROCEDURE — 63600175 PHARM REV CODE 636 W HCPCS: Performed by: ORTHOPAEDIC SURGERY

## 2024-03-20 PROCEDURE — 88304 TISSUE EXAM BY PATHOLOGIST: CPT | Performed by: STUDENT IN AN ORGANIZED HEALTH CARE EDUCATION/TRAINING PROGRAM

## 2024-03-20 PROCEDURE — 36000710: Performed by: ORTHOPAEDIC SURGERY

## 2024-03-20 PROCEDURE — 63600175 PHARM REV CODE 636 W HCPCS: Performed by: NURSE ANESTHETIST, CERTIFIED REGISTERED

## 2024-03-20 PROCEDURE — 25000003 PHARM REV CODE 250: Performed by: NURSE ANESTHETIST, CERTIFIED REGISTERED

## 2024-03-20 PROCEDURE — 25000003 PHARM REV CODE 250: Performed by: ORTHOPAEDIC SURGERY

## 2024-03-20 PROCEDURE — 37000009 HC ANESTHESIA EA ADD 15 MINS: Performed by: ORTHOPAEDIC SURGERY

## 2024-03-20 PROCEDURE — 27130 TOTAL HIP ARTHROPLASTY: CPT | Mod: AS,LT,,

## 2024-03-20 PROCEDURE — D9220A PRA ANESTHESIA: Mod: CRNA,,, | Performed by: NURSE ANESTHETIST, CERTIFIED REGISTERED

## 2024-03-20 PROCEDURE — 94799 UNLISTED PULMONARY SVC/PX: CPT

## 2024-03-20 PROCEDURE — 63600175 PHARM REV CODE 636 W HCPCS

## 2024-03-20 PROCEDURE — 27130 TOTAL HIP ARTHROPLASTY: CPT | Mod: 22,LT,, | Performed by: ORTHOPAEDIC SURGERY

## 2024-03-20 PROCEDURE — 88307 TISSUE EXAM BY PATHOLOGIST: CPT | Mod: 26,,, | Performed by: STUDENT IN AN ORGANIZED HEALTH CARE EDUCATION/TRAINING PROGRAM

## 2024-03-20 PROCEDURE — 36000711: Performed by: ORTHOPAEDIC SURGERY

## 2024-03-20 PROCEDURE — 88311 DECALCIFY TISSUE: CPT | Mod: 26,,, | Performed by: STUDENT IN AN ORGANIZED HEALTH CARE EDUCATION/TRAINING PROGRAM

## 2024-03-20 PROCEDURE — 37000008 HC ANESTHESIA 1ST 15 MINUTES: Performed by: ORTHOPAEDIC SURGERY

## 2024-03-20 PROCEDURE — 25000003 PHARM REV CODE 250: Performed by: STUDENT IN AN ORGANIZED HEALTH CARE EDUCATION/TRAINING PROGRAM

## 2024-03-20 PROCEDURE — 27000221 HC OXYGEN, UP TO 24 HOURS

## 2024-03-20 PROCEDURE — D9220A PRA ANESTHESIA: Mod: ANES,,, | Performed by: ANESTHESIOLOGY

## 2024-03-20 PROCEDURE — 88311 DECALCIFY TISSUE: CPT | Performed by: STUDENT IN AN ORGANIZED HEALTH CARE EDUCATION/TRAINING PROGRAM

## 2024-03-20 PROCEDURE — 94761 N-INVAS EAR/PLS OXIMETRY MLT: CPT

## 2024-03-20 PROCEDURE — C1776 JOINT DEVICE (IMPLANTABLE): HCPCS | Performed by: ORTHOPAEDIC SURGERY

## 2024-03-20 DEVICE — PINNACLE GRIPTION ACETABULAR SHELL SECTOR 58MM OD
Type: IMPLANTABLE DEVICE | Site: HIP | Status: FUNCTIONAL
Brand: PINNACLE GRIPTION

## 2024-03-20 DEVICE — IMPLANTABLE DEVICE: Type: IMPLANTABLE DEVICE | Site: HIP | Status: FUNCTIONAL

## 2024-03-20 RX ORDER — MIDAZOLAM HYDROCHLORIDE 1 MG/ML
4 INJECTION, SOLUTION INTRAMUSCULAR; INTRAVENOUS
Status: DISCONTINUED | OUTPATIENT
Start: 2024-03-20 | End: 2024-03-20 | Stop reason: HOSPADM

## 2024-03-20 RX ORDER — INSULIN ASPART 100 [IU]/ML
0-5 INJECTION, SOLUTION INTRAVENOUS; SUBCUTANEOUS
Status: DISCONTINUED | OUTPATIENT
Start: 2024-03-20 | End: 2024-03-21 | Stop reason: HOSPADM

## 2024-03-20 RX ORDER — MORPHINE SULFATE 2 MG/ML
2 INJECTION, SOLUTION INTRAMUSCULAR; INTRAVENOUS
Status: DISCONTINUED | OUTPATIENT
Start: 2024-03-20 | End: 2024-03-21 | Stop reason: HOSPADM

## 2024-03-20 RX ORDER — AMOXICILLIN 250 MG
1 CAPSULE ORAL 2 TIMES DAILY
Status: DISCONTINUED | OUTPATIENT
Start: 2024-03-20 | End: 2024-03-21 | Stop reason: HOSPADM

## 2024-03-20 RX ORDER — NICARDIPINE HYDROCHLORIDE 2.5 MG/ML
INJECTION INTRAVENOUS
Status: DISCONTINUED | OUTPATIENT
Start: 2024-03-20 | End: 2024-03-20

## 2024-03-20 RX ORDER — PREGABALIN 75 MG/1
75 CAPSULE ORAL NIGHTLY
Status: DISCONTINUED | OUTPATIENT
Start: 2024-03-20 | End: 2024-03-21 | Stop reason: HOSPADM

## 2024-03-20 RX ORDER — ONDANSETRON HYDROCHLORIDE 2 MG/ML
INJECTION, SOLUTION INTRAVENOUS
Status: DISCONTINUED | OUTPATIENT
Start: 2024-03-20 | End: 2024-03-20

## 2024-03-20 RX ORDER — LIDOCAINE HYDROCHLORIDE 20 MG/ML
INJECTION INTRAVENOUS
Status: DISCONTINUED | OUTPATIENT
Start: 2024-03-20 | End: 2024-03-20

## 2024-03-20 RX ORDER — LISINOPRIL 20 MG/1
20 TABLET ORAL NIGHTLY
Status: DISCONTINUED | OUTPATIENT
Start: 2024-03-21 | End: 2024-03-21 | Stop reason: HOSPADM

## 2024-03-20 RX ORDER — MIDAZOLAM HYDROCHLORIDE 1 MG/ML
INJECTION INTRAMUSCULAR; INTRAVENOUS
Status: DISCONTINUED | OUTPATIENT
Start: 2024-03-20 | End: 2024-03-20

## 2024-03-20 RX ORDER — TALC
6 POWDER (GRAM) TOPICAL NIGHTLY PRN
Status: DISCONTINUED | OUTPATIENT
Start: 2024-03-20 | End: 2024-03-21 | Stop reason: HOSPADM

## 2024-03-20 RX ORDER — KETAMINE HCL IN 0.9 % NACL 50 MG/5 ML
SYRINGE (ML) INTRAVENOUS
Status: DISCONTINUED | OUTPATIENT
Start: 2024-03-20 | End: 2024-03-20

## 2024-03-20 RX ORDER — FENTANYL CITRATE 50 UG/ML
25 INJECTION, SOLUTION INTRAMUSCULAR; INTRAVENOUS EVERY 5 MIN PRN
Status: DISCONTINUED | OUTPATIENT
Start: 2024-03-20 | End: 2024-03-20 | Stop reason: HOSPADM

## 2024-03-20 RX ORDER — SODIUM CHLORIDE 9 MG/ML
INJECTION, SOLUTION INTRAVENOUS CONTINUOUS
Status: DISCONTINUED | OUTPATIENT
Start: 2024-03-20 | End: 2024-03-21 | Stop reason: HOSPADM

## 2024-03-20 RX ORDER — DEXMEDETOMIDINE HYDROCHLORIDE 100 UG/ML
INJECTION, SOLUTION INTRAVENOUS
Status: DISCONTINUED | OUTPATIENT
Start: 2024-03-20 | End: 2024-03-20

## 2024-03-20 RX ORDER — FENTANYL CITRATE 50 UG/ML
100 INJECTION, SOLUTION INTRAMUSCULAR; INTRAVENOUS
Status: DISCONTINUED | OUTPATIENT
Start: 2024-03-20 | End: 2024-03-20 | Stop reason: HOSPADM

## 2024-03-20 RX ORDER — PROCHLORPERAZINE EDISYLATE 5 MG/ML
5 INJECTION INTRAMUSCULAR; INTRAVENOUS EVERY 6 HOURS PRN
Status: DISCONTINUED | OUTPATIENT
Start: 2024-03-20 | End: 2024-03-21 | Stop reason: HOSPADM

## 2024-03-20 RX ORDER — SODIUM CHLORIDE 9 MG/ML
INJECTION, SOLUTION INTRAVENOUS
Status: DISCONTINUED | OUTPATIENT
Start: 2024-03-20 | End: 2024-03-20 | Stop reason: HOSPADM

## 2024-03-20 RX ORDER — NICARDIPINE HYDROCHLORIDE 0.2 MG/ML
INJECTION INTRAVENOUS CONTINUOUS PRN
Status: DISCONTINUED | OUTPATIENT
Start: 2024-03-20 | End: 2024-03-20

## 2024-03-20 RX ORDER — DEXAMETHASONE SODIUM PHOSPHATE 4 MG/ML
INJECTION, SOLUTION INTRA-ARTICULAR; INTRALESIONAL; INTRAMUSCULAR; INTRAVENOUS; SOFT TISSUE
Status: DISCONTINUED | OUTPATIENT
Start: 2024-03-20 | End: 2024-03-20

## 2024-03-20 RX ORDER — POLYETHYLENE GLYCOL 3350 17 G/17G
17 POWDER, FOR SOLUTION ORAL DAILY
Status: DISCONTINUED | OUTPATIENT
Start: 2024-03-21 | End: 2024-03-21 | Stop reason: HOSPADM

## 2024-03-20 RX ORDER — ONDANSETRON HYDROCHLORIDE 2 MG/ML
4 INJECTION, SOLUTION INTRAVENOUS EVERY 8 HOURS PRN
Status: DISCONTINUED | OUTPATIENT
Start: 2024-03-20 | End: 2024-03-21 | Stop reason: HOSPADM

## 2024-03-20 RX ORDER — LIDOCAINE HYDROCHLORIDE 10 MG/ML
1 INJECTION, SOLUTION EPIDURAL; INFILTRATION; INTRACAUDAL; PERINEURAL
Status: DISCONTINUED | OUTPATIENT
Start: 2024-03-20 | End: 2024-03-20 | Stop reason: HOSPADM

## 2024-03-20 RX ORDER — METFORMIN HYDROCHLORIDE 500 MG/1
500 TABLET, EXTENDED RELEASE ORAL EVERY MORNING
Status: DISCONTINUED | OUTPATIENT
Start: 2024-03-21 | End: 2024-03-21 | Stop reason: HOSPADM

## 2024-03-20 RX ORDER — DULOXETIN HYDROCHLORIDE 30 MG/1
60 CAPSULE, DELAYED RELEASE ORAL NIGHTLY
Status: DISCONTINUED | OUTPATIENT
Start: 2024-03-20 | End: 2024-03-21 | Stop reason: HOSPADM

## 2024-03-20 RX ORDER — ROPIVACAINE/EPI/CLONIDINE/KET 2.46-0.005
SYRINGE (ML) INJECTION
Status: DISCONTINUED | OUTPATIENT
Start: 2024-03-20 | End: 2024-03-20 | Stop reason: HOSPADM

## 2024-03-20 RX ORDER — ALLOPURINOL 100 MG/1
200 TABLET ORAL NIGHTLY
Status: DISCONTINUED | OUTPATIENT
Start: 2024-03-20 | End: 2024-03-21 | Stop reason: HOSPADM

## 2024-03-20 RX ORDER — ACETAMINOPHEN 500 MG
1000 TABLET ORAL EVERY 6 HOURS
Status: DISCONTINUED | OUTPATIENT
Start: 2024-03-20 | End: 2024-03-21 | Stop reason: HOSPADM

## 2024-03-20 RX ORDER — CELECOXIB 200 MG/1
400 CAPSULE ORAL ONCE
Status: COMPLETED | OUTPATIENT
Start: 2024-03-20 | End: 2024-03-20

## 2024-03-20 RX ORDER — METHOCARBAMOL 750 MG/1
750 TABLET, FILM COATED ORAL 3 TIMES DAILY
Status: DISCONTINUED | OUTPATIENT
Start: 2024-03-20 | End: 2024-03-21 | Stop reason: HOSPADM

## 2024-03-20 RX ORDER — GLUCAGON 1 MG
1 KIT INJECTION
Status: DISCONTINUED | OUTPATIENT
Start: 2024-03-20 | End: 2024-03-21 | Stop reason: HOSPADM

## 2024-03-20 RX ORDER — OXYCODONE HYDROCHLORIDE 5 MG/1
5 TABLET ORAL
Status: DISCONTINUED | OUTPATIENT
Start: 2024-03-20 | End: 2024-03-21 | Stop reason: HOSPADM

## 2024-03-20 RX ORDER — PREGABALIN 75 MG/1
75 CAPSULE ORAL
Status: COMPLETED | OUTPATIENT
Start: 2024-03-20 | End: 2024-03-20

## 2024-03-20 RX ORDER — MUPIROCIN 20 MG/G
1 OINTMENT TOPICAL
Status: COMPLETED | OUTPATIENT
Start: 2024-03-20 | End: 2024-03-20

## 2024-03-20 RX ORDER — OXYCODONE HYDROCHLORIDE 10 MG/1
10 TABLET ORAL
Status: DISCONTINUED | OUTPATIENT
Start: 2024-03-20 | End: 2024-03-21 | Stop reason: HOSPADM

## 2024-03-20 RX ORDER — PROPOFOL 10 MG/ML
INJECTION, EMULSION INTRAVENOUS CONTINUOUS PRN
Status: DISCONTINUED | OUTPATIENT
Start: 2024-03-20 | End: 2024-03-20

## 2024-03-20 RX ORDER — NALOXONE HCL 0.4 MG/ML
0.02 VIAL (ML) INJECTION
Status: DISCONTINUED | OUTPATIENT
Start: 2024-03-20 | End: 2024-03-21 | Stop reason: HOSPADM

## 2024-03-20 RX ORDER — FAMOTIDINE 10 MG/ML
INJECTION INTRAVENOUS
Status: DISCONTINUED | OUTPATIENT
Start: 2024-03-20 | End: 2024-03-20

## 2024-03-20 RX ORDER — POLYETHYLENE GLYCOL 3350 17 G/17G
17 POWDER, FOR SOLUTION ORAL DAILY PRN
Status: DISCONTINUED | OUTPATIENT
Start: 2024-03-20 | End: 2024-03-21 | Stop reason: HOSPADM

## 2024-03-20 RX ORDER — IBUPROFEN 200 MG
16 TABLET ORAL
Status: DISCONTINUED | OUTPATIENT
Start: 2024-03-20 | End: 2024-03-21 | Stop reason: HOSPADM

## 2024-03-20 RX ORDER — VANCOMYCIN HYDROCHLORIDE 1 G/20ML
INJECTION, POWDER, LYOPHILIZED, FOR SOLUTION INTRAVENOUS
Status: DISCONTINUED | OUTPATIENT
Start: 2024-03-20 | End: 2024-03-20 | Stop reason: HOSPADM

## 2024-03-20 RX ORDER — DULOXETIN HYDROCHLORIDE 20 MG/1
20 CAPSULE, DELAYED RELEASE ORAL NIGHTLY
Status: DISCONTINUED | OUTPATIENT
Start: 2024-03-20 | End: 2024-03-21 | Stop reason: HOSPADM

## 2024-03-20 RX ORDER — LABETALOL HYDROCHLORIDE 5 MG/ML
INJECTION, SOLUTION INTRAVENOUS
Status: DISCONTINUED | OUTPATIENT
Start: 2024-03-20 | End: 2024-03-20

## 2024-03-20 RX ORDER — ACETAMINOPHEN 500 MG
1000 TABLET ORAL
Status: COMPLETED | OUTPATIENT
Start: 2024-03-20 | End: 2024-03-20

## 2024-03-20 RX ORDER — IBUPROFEN 200 MG
24 TABLET ORAL
Status: DISCONTINUED | OUTPATIENT
Start: 2024-03-20 | End: 2024-03-21 | Stop reason: HOSPADM

## 2024-03-20 RX ORDER — MUPIROCIN 20 MG/G
1 OINTMENT TOPICAL 2 TIMES DAILY
Status: DISCONTINUED | OUTPATIENT
Start: 2024-03-20 | End: 2024-03-21 | Stop reason: HOSPADM

## 2024-03-20 RX ORDER — ASPIRIN 81 MG/1
81 TABLET ORAL 2 TIMES DAILY
Status: DISCONTINUED | OUTPATIENT
Start: 2024-03-20 | End: 2024-03-21 | Stop reason: HOSPADM

## 2024-03-20 RX ORDER — FAMOTIDINE 20 MG/1
20 TABLET, FILM COATED ORAL 2 TIMES DAILY
Status: DISCONTINUED | OUTPATIENT
Start: 2024-03-20 | End: 2024-03-21 | Stop reason: HOSPADM

## 2024-03-20 RX ADMIN — TRANEXAMIC ACID 1000 MG: 100 INJECTION INTRAVENOUS at 03:03

## 2024-03-20 RX ADMIN — ACETAMINOPHEN 1000 MG: 500 TABLET ORAL at 11:03

## 2024-03-20 RX ADMIN — PROPOFOL 100 MCG/KG/MIN: 10 INJECTION, EMULSION INTRAVENOUS at 01:03

## 2024-03-20 RX ADMIN — DOCUSATE SODIUM AND SENNOSIDES 1 TABLET: 8.6; 5 TABLET, FILM COATED ORAL at 09:03

## 2024-03-20 RX ADMIN — FAMOTIDINE 20 MG: 20 TABLET, FILM COATED ORAL at 09:03

## 2024-03-20 RX ADMIN — LABETALOL HYDROCHLORIDE 5 MG: 5 INJECTION, SOLUTION INTRAVENOUS at 02:03

## 2024-03-20 RX ADMIN — FAMOTIDINE 20 MG: 10 INJECTION, SOLUTION INTRAVENOUS at 01:03

## 2024-03-20 RX ADMIN — PREGABALIN 75 MG: 75 CAPSULE ORAL at 09:03

## 2024-03-20 RX ADMIN — MUPIROCIN 1 G: 20 OINTMENT TOPICAL at 11:03

## 2024-03-20 RX ADMIN — CEFAZOLIN 2 G: 2 INJECTION, POWDER, FOR SOLUTION INTRAMUSCULAR; INTRAVENOUS at 09:03

## 2024-03-20 RX ADMIN — Medication 30 MG: at 01:03

## 2024-03-20 RX ADMIN — SODIUM CHLORIDE, SODIUM GLUCONATE, SODIUM ACETATE, POTASSIUM CHLORIDE, MAGNESIUM CHLORIDE, SODIUM PHOSPHATE, DIBASIC, AND POTASSIUM PHOSPHATE: .53; .5; .37; .037; .03; .012; .00082 INJECTION, SOLUTION INTRAVENOUS at 01:03

## 2024-03-20 RX ADMIN — DULOXETINE 20 MG: 20 CAPSULE, DELAYED RELEASE ORAL at 09:03

## 2024-03-20 RX ADMIN — ONDANSETRON 4 MG: 2 INJECTION INTRAMUSCULAR; INTRAVENOUS at 01:03

## 2024-03-20 RX ADMIN — Medication 20 MG: at 01:03

## 2024-03-20 RX ADMIN — MUPIROCIN 1 G: 20 OINTMENT TOPICAL at 09:03

## 2024-03-20 RX ADMIN — SODIUM CHLORIDE, SODIUM GLUCONATE, SODIUM ACETATE, POTASSIUM CHLORIDE, MAGNESIUM CHLORIDE, SODIUM PHOSPHATE, DIBASIC, AND POTASSIUM PHOSPHATE: .53; .5; .37; .037; .03; .012; .00082 INJECTION, SOLUTION INTRAVENOUS at 02:03

## 2024-03-20 RX ADMIN — PREGABALIN 75 MG: 75 CAPSULE ORAL at 11:03

## 2024-03-20 RX ADMIN — TRANEXAMIC ACID 2000 MG: 100 INJECTION INTRAVENOUS at 01:03

## 2024-03-20 RX ADMIN — DEXAMETHASONE SODIUM PHOSPHATE 8 MG: 4 INJECTION, SOLUTION INTRAMUSCULAR; INTRAVENOUS at 01:03

## 2024-03-20 RX ADMIN — VANCOMYCIN HYDROCHLORIDE 1500 MG: 1.5 INJECTION, POWDER, LYOPHILIZED, FOR SOLUTION INTRAVENOUS at 11:03

## 2024-03-20 RX ADMIN — NICARDIPINE HYDROCHLORIDE 0.4 MG: 25 INJECTION, SOLUTION INTRAVENOUS at 02:03

## 2024-03-20 RX ADMIN — SODIUM CHLORIDE: 9 INJECTION, SOLUTION INTRAVENOUS at 11:03

## 2024-03-20 RX ADMIN — CEFAZOLIN SODIUM 3 ML: 2 SOLUTION INTRAVENOUS at 01:03

## 2024-03-20 RX ADMIN — ACETAMINOPHEN 1000 MG: 500 TABLET ORAL at 06:03

## 2024-03-20 RX ADMIN — MIDAZOLAM HYDROCHLORIDE 2 MG: 1 INJECTION, SOLUTION INTRAMUSCULAR; INTRAVENOUS at 01:03

## 2024-03-20 RX ADMIN — ASPIRIN 81 MG: 81 TABLET, COATED ORAL at 09:03

## 2024-03-20 RX ADMIN — ALLOPURINOL 200 MG: 100 TABLET ORAL at 09:03

## 2024-03-20 RX ADMIN — METHOCARBAMOL 750 MG: 750 TABLET ORAL at 09:03

## 2024-03-20 RX ADMIN — CELECOXIB 400 MG: 200 CAPSULE ORAL at 11:03

## 2024-03-20 RX ADMIN — LIDOCAINE HYDROCHLORIDE 100 MG: 20 INJECTION INTRAVENOUS at 01:03

## 2024-03-20 RX ADMIN — SODIUM CHLORIDE, SODIUM GLUCONATE, SODIUM ACETATE, POTASSIUM CHLORIDE, MAGNESIUM CHLORIDE, SODIUM PHOSPHATE, DIBASIC, AND POTASSIUM PHOSPHATE: .53; .5; .37; .037; .03; .012; .00082 INJECTION, SOLUTION INTRAVENOUS at 03:03

## 2024-03-20 RX ADMIN — DEXMEDETOMIDINE 20 MCG: 100 INJECTION, SOLUTION, CONCENTRATE INTRAVENOUS at 01:03

## 2024-03-20 RX ADMIN — NICARDIPINE HYDROCHLORIDE 5 MG/HR: 0.2 INJECTION, SOLUTION INTRAVENOUS at 02:03

## 2024-03-20 RX ADMIN — DULOXETINE HYDROCHLORIDE 60 MG: 30 CAPSULE, DELAYED RELEASE ORAL at 09:03

## 2024-03-20 RX ADMIN — MEPIVACAINE HYDROCHLORIDE 3 ML: 20 INJECTION, SOLUTION EPIDURAL; INFILTRATION at 01:03

## 2024-03-20 RX ADMIN — SODIUM CHLORIDE: 9 INJECTION, SOLUTION INTRAVENOUS at 04:03

## 2024-03-20 RX ADMIN — OXYCODONE 5 MG: 5 TABLET ORAL at 04:03

## 2024-03-20 NOTE — ANESTHESIA PREPROCEDURE EVALUATION
03/20/2024  Pre-operative evaluation for Procedure(s) (LRB):  ARTHROPLASTY, HIP, TOTAL, POSTERIOR APPROACH: LEFT: DEPUY - ACTIS + PINNACLE (Left)    Chris Nava is a 53 y.o. male w/ PMHx of HTN, JUSTIN (no longer uses CPAP due to weight loss), [place]    Patient Active Problem List   Diagnosis    Class 3 severe obesity without serious comorbidity with body mass index (BMI) of 45.0 to 49.9 in adult    Arthritis    Bone lesion    HTN (hypertension)    JUSTIN (obstructive sleep apnea)    Morbid obesity with BMI of 45.0-49.9, adult    Avascular necrosis of bone of left hip       Review of patient's allergies indicates:  No Known Allergies    No current facility-administered medications on file prior to encounter.     Current Outpatient Medications on File Prior to Encounter   Medication Sig Dispense Refill    allopurinoL (ZYLOPRIM) 100 MG tablet Take 200 mg by mouth nightly.      celecoxib (CELEBREX) 200 MG capsule TAKE 1 CAPSULE BY MOUTH ONCE DAILY. 30 capsule 1    DULoxetine (CYMBALTA) 20 MG capsule Take 1 capsule (20 mg total) by mouth once daily. (Patient taking differently: Take 20 mg by mouth nightly.) 90 capsule 3    DULoxetine (CYMBALTA) 60 MG capsule Take 1 capsule (60 mg total) by mouth once daily. (Patient taking differently: Take 60 mg by mouth nightly.) 90 capsule 3    lisinopriL (PRINIVIL,ZESTRIL) 20 MG tablet Take 20 mg by mouth nightly.      metFORMIN (GLUCOPHAGE-XR) 500 MG ER 24hr tablet Take 500 mg by mouth every morning.      multivit-min/ferrous fumarate (MULTI VITAMIN ORAL) Take 1 tablet by mouth once daily.      potassium citrate (UROCIT-K 15) 15 mEq TbSR Take 1 tablet by mouth nightly.         Past Surgical History:   Procedure Laterality Date    CYSTOSCOPY W/ LASER LITHOTRIPSY      JUSTIN surgery      removed uvula, tonsils and adenoids for JUSTIN    THYROID CYST EXCISION      2017    VASECTOMY          Social History     Socioeconomic History    Marital status:     Number of children: 2   Occupational History    Occupation: [employer]   Tobacco Use    Smoking status: Never     Passive exposure: Never    Smokeless tobacco: Never   Substance and Sexual Activity    Alcohol use: Yes     Comment: Maybe wine at dinner 1x per week, maybe bourbon 1 night p/wk    Drug use: Yes     Frequency: 7.0 times per week     Types: Marijuana     Comment: Sleep aid only for arthritic purposes. Use for 2 months only    Sexual activity: Yes     Partners: Female     Birth control/protection: Post-menopausal     Comment: I have a vasectomy, she is post menopausal   Social History Narrative    14     Social Determinants of Health     Financial Resource Strain: Low Risk  (1/29/2024)    Overall Financial Resource Strain (CARDIA)     Difficulty of Paying Living Expenses: Not very hard   Food Insecurity: No Food Insecurity (1/29/2024)    Hunger Vital Sign     Worried About Running Out of Food in the Last Year: Never true     Ran Out of Food in the Last Year: Never true   Transportation Needs: No Transportation Needs (1/29/2024)    PRAPARE - Transportation     Lack of Transportation (Medical): No     Lack of Transportation (Non-Medical): No   Physical Activity: Inactive (1/29/2024)    Exercise Vital Sign     Days of Exercise per Week: 0 days     Minutes of Exercise per Session: 0 min   Stress: No Stress Concern Present (1/29/2024)    [employer] - Occupational Stress Questionnaire     Feeling of Stress : Not at all   Recent Concern: Stress - Stress Concern Present (1/18/2024)    [employer] - Occupational Stress Questionnaire     Feeling of Stress : To some extent   Social Connections: Socially Isolated (1/29/2024)    Social Connection and Isolation Panel [NHANES]     Frequency of Communication with Friends and Family: More than three times a week     Frequency of Social  "Gatherings with Friends and Family: Three times a week     Attends Protestant Services: Never     Active Member of Clubs or Organizations: No     Attends Club or Organization Meetings: Never     Marital Status:    Housing Stability: Low Risk  (2024)    Housing Stability Vital Sign     Unable to Pay for Housing in the Last Year: No     Number of Places Lived in the Last Year: 2     Unstable Housing in the Last Year: No         CBC: No results for input(s): "WBC", "RBC", "HGB", "HCT", "PLT", "MCV", "MCH", "MCHC" in the last 72 hours.    CMP: No results for input(s): "NA", "K", "CL", "CO2", "BUN", "CREATININE", "GLU", "MG", "PHOS", "CALCIUM", "ALBUMIN", "PROT", "ALKPHOS", "ALT", "AST", "BILITOT" in the last 72 hours.    INR  No results for input(s): "PT", "INR", "PROTIME", "APTT" in the last 72 hours.        Diagnostic Studies:      EKD Echo:  No results found for this or any previous visit.       Pre-op Assessment    I have reviewed the Patient Summary Reports.     I have reviewed the Nursing Notes. I have reviewed the NPO Status.   I have reviewed the Medications.     Review of Systems  Cardiovascular:     Hypertension                                        Pulmonary:        Sleep Apnea                    Physical Exam  General: Well nourished and Cooperative    Airway:  Mallampati: II   Mouth Opening: Normal  TM Distance: Normal  Tongue: Normal  Neck ROM: Normal ROM    Chest/Lungs:  Clear to auscultation, Normal Respiratory Rate    Heart:  Rate: Normal  Rhythm: Regular Rhythm  Sounds: Normal        Anesthesia Plan  Type of Anesthesia, risks & benefits discussed:    Anesthesia Type: Gen ETT, CSE, Regional, Gen Natural Airway  Intra-op Monitoring Plan: Standard ASA Monitors  Post Op Pain Control Plan: multimodal analgesia and IV/PO Opioids PRN  Induction:  IV  Airway Plan: Direct and Video, Post-Induction  Informed Consent: Informed consent signed with the Patient and all parties understand the " risks and agree with anesthesia plan.  All questions answered.   ASA Score: 3    Ready For Surgery From Anesthesia Perspective.     .

## 2024-03-20 NOTE — PROGRESS NOTES
Pre op completed.  Patient belongings given to friend. Bed in lowest position. Call light within reach. Family at beside. DME needed. Bear hugger refused.

## 2024-03-20 NOTE — ANESTHESIA PROCEDURE NOTES
CSE    Patient location during procedure: OR  Start time: 3/20/2024 1:34 PM  Timeout: 3/20/2024 1:34 PM  End time: 3/20/2024 1:38 PM      Staffing  Authorizing Provider: Akira Ware MD  Performing Provider: Akira Ware MD    Staffing  Performed by: Akira Ware MD  Authorized by: Akira Ware MD    Preanesthetic Checklist  Completed: patient identified, IV checked, site marked, risks and benefits discussed, surgical consent, monitors and equipment checked, pre-op evaluation and timeout performed  CSE  Patient position: sitting  Prep: ChloraPrep  Patient monitoring: heart rate, continuous pulse ox and frequent blood pressure checks  Approach: midline  Spinal Needle  Needle type: pencil-tip   Needle gauge: 25 G  Needle length: 5 in  Epidural Needle  Injection technique: ZARA air  Needle type: Tuohy   Needle gauge: 17 G  Needle length: 3.5 in  Needle insertion depth: 6.5 cm  Location: L4-5  Needle localization: anatomical landmarks   Catheter  Catheter type: springwound  Catheter size: 19 G  Catheter at skin depth: 11 cm  Additional Documentation: negative aspiration for heme, incremental injection and no paresthesia on injection  Assessment  Intrathecal Medications:   administered: primary anesthetic mcg of    Medications:    Medications: Intrathecal - mepivacaine 20 mg/mL (2 %) injection - Intrathecal   3 mL - 3/20/2024 1:38:00 PM

## 2024-03-20 NOTE — NURSING TRANSFER
Nursing Transfer Note      3/20/2024   5:08 PM    Sx: total hip (L) with Dr. Barrios  NKDA  Hx: arthritis, HTN, s/p sx for JUSTIN, kidney stones  Drsg:dermadond, tefla, tegaderm; ice pack  Resolved at 1615 upon arrival  18g L hand NS @ 150ml/hr  5mg Oxycodone at 1627    Nurse giving handoff:GLEN Anderson  Nurse receiving handoff:GLEN Ruano    Reason patient is being transferred: continues observation    Transfer To: 302    Transfer via bed    Transported by RN    Transfer Vital Signs:  Blood Pressure:114/74  Heart Rate:77  O2:97  Temperature:98  Respirations:18    Order for Tele Monitor? No    4eyes on Skin: yes    Medicines sent: n/a    Any special needs or follow-up needed: n/a    Patient belongings transferred with patient: Yes    Chart send with patient: Yes    Notified: friend    Patient reassessed at: 4 out of 10- oxycodone 5 mg given

## 2024-03-20 NOTE — TRANSFER OF CARE
"Anesthesia Transfer of Care Note    Patient: Chris Nava    Procedure(s) Performed: Procedure(s) (LRB):  ARTHROPLASTY, HIP, TOTAL, POSTERIOR APPROACH: LEFT: DEPUY - ACTIS + PINNACLE (Left)    Patient location: PACU    Anesthesia Type: epidural and MAC    Transport from OR: Transported from OR on 6-10 L/min O2 by face mask with adequate spontaneous ventilation    Post pain: adequate analgesia    Post assessment: no apparent anesthetic complications and tolerated procedure well    Post vital signs: stable    Level of consciousness: awake    Nausea/Vomiting: no nausea/vomiting    Complications: none    Transfer of care protocol was followed      Last vitals: Visit Vitals  /75 (BP Location: Right forearm, Patient Position: Sitting)   Pulse 91   Temp 36.7 °C (98 °F) (Temporal)   Resp (!) 41   Ht 5' 9" (1.753 m)   Wt 136.1 kg (300 lb)   SpO2 100%   BMI 44.30 kg/m²     "

## 2024-03-20 NOTE — OP NOTE
Denis Left KAYLA posterior  OPERATIVE NOTE    Date of Operation:   3/20/2024    Providers Performing:   Surgeon(s):  Scot Barrios III, MD  Assistant: Eric Tavares PA-C, I certify that the services for which payment is claimed were medically necessary, and that no qualified resident was available to perform the services.      Operative Procedure:   Left total hip arthroplasty, CPT 66852    -22 modifier for CDC class 2 or higher obesity (BMI 44.3) requiring prolonged operative time and increased case complexity and difficulty      Preoperative Diagnosis:   Left hip avascular necrosis with femoral head collapse    Postoperative Diagnosis:   SAME    Components Used:   Depuy  Cup: [place] Sector Gription; Size 58  Liner: pinnacle dual mobility liner 58/49  Stem: Maxwell Duofix HA size 8 STD  Ball: Biolox Delta Ceramic 12/14 taper 28mm +1.5, Bi-mentum ALTRX liner 49/28  two acetabular screws 6.5mm x 25mm and 30mm    aquamantys    Implant Name Type Inv. Item Serial No.  Lot No. LRB No. Used Action   SHELL ACET SECTOR 58MM - [id number]  SHELL ACET SECTOR 58MM  [employer] [id number] Left 1 Implanted   [place] Dual Mobility Acetabular SHell 58mm    [employer] [id number] Left 1 Implanted   [place] Bone Screw 6.5x30mm    [employer] [id number] Left 1 Implanted   [place] Bone Screw 6.5x25mm    DEPU[employer] [id number] Left 1 Implanted   Maxwell Femoral Stem Taper 12/14 Size 8    [employer] [id number] Left 1 Implanted   Biolox Femroal Head 28mm    [employer] [id number] Left 1 Implanted   Bi-Mentum Liner 49/28    [employer] [id number] Left 1 Implanted       Anesthesia:   Spinal+catheter    AARTI cocktail: MERYL    Estimated Blood Loss:   500 cc    Specimens:   Tissue sent for permanent pathology    Complications:   None    Indications:   The patient has longstanding hip pain secondary to primary osteoarthritis. They have failed conservative management which includes anti-inflammatory medications and activity modification. We reviewed the  risks and benefits of the  hip replacement with the patient prior to surgery including risk of infection, blood clot, leg length inequality, dislocation, components loosening, components wearing out, need for revision surgery, continued pain, limping, and injury to nerves and vessels.  After discussing the risks and benefits of the procedure they would like to proceed with surgery.     Operative Notes:   The patient was identified in the pre-operative holding area and the operative site was marked.  Prophylactic antibiotics were administered intravenously prior to skin incision.  The patient was positioned operative side up on the operating table.  The pelvis was secured to the operating table between hip positioners.  A time out was performed and the operative lower extremity was prepped and draped in the usual sterile fashion.     A posterior approach was utilized to expose the hip joint.  The skin was sharply incised down to and through the iliotibial band and gluteus sena fascia.  Deep [place]arnley retractors were placed and the trochanteric bursa was incised.  A cobra retractor was placed over the femoral neck between the gluteus medius and minimus.  A capsulotomy was performed along the proximal edge and parallel to the piriformis tendon extending from the posterior rim of the acetabulum to its insertion on the greater trochanter.  The capsule, piriformis tendon, short external rotators and quadratus femoris were taken down as a single layer off the posterior aspect of the proximal femur exposing the lesser trochanter.  The femoral head was then dislocated and the center of the femoral head identified.  Any soft tissue overlying the saddle of the femoral neck adjacent to the greater trochanter was removed. A femoral neck osteotomy was performed at the level templated referencing the lesser and greater trochanter and parallel to the neck of the femoral stem.      The acetabulum was then exposed.  The inferior  capsule was incised.  A partial capsulotomy was performed along the superior and anterior aspect of the acetabulum to facilitate retraction of the femur anterior to the socket.  The pubis and the ischium were palpated and deep retractors were placed.  The residual labrum was sharply excised.  Anatomic landmarks including the sciatic notch, pubis, superolateral edge of the ilium, ischium, medial wall and transverse acetabular ligament were identified.  Reaming of the acetabulum was guided by the native anteversion of the acetabulum which was defined by the plane between the pubis and the ischium as well as the transverse acetabular ligament.  The reamers were kept reltively parallel to this plane and the edge of the reamer was advanced just medial to the transverse acetabular ligament.  Inclination of the reamers was held between 40-45 degrees.  The acetabulum was progressively reamed until the anterior and posterior walls were engaged with a 55 mm reamer.  A 56mm trial cup was impacted with adequate stability and a trial liner inserted.    The femur was then exposed. A rongeur, box osteotome, and lateralizing reamer was used to remove any cortical bone overlying the entry point of the femoral intramedullary canal and from the overhanging greater trochanter to clear a path for the straight rigid reamers to pass into the canal distally without pushing them into varus. A canal finder was used to find the center point of the canal and reamed up to size 8 at which point the endosteal cortex was engaged.  The femur was then broached in approximately 10-15 degrees of anteversion to a size 8.  The distance from the proximal aspect of the greater trochanter to the shoulder of the templated stem was measured and used to compare to the preoperative templates.  This line provided an additional reference point to assess how far to advance the femoral component distally to achieve the desired leg length.  Any bony prominences  along the anterior aspect of the greater trochanter relative to the anterior aspect of the femoral neck  were also taken down to minimize the risk of bony impingement on the pelvis.     A trial neck and head were placed on the broach. The hip was reduced and taken through a range of motion to impingment.  Combined anteversion, offset and leg lengths were also assessed.  Attention was focused on restoring leg lengths and offset as closely as possible according to the pre-operative plan and to maximize the range of motion possible before encountering prosthetic or bony impingment.     The trial socket was removed and then reamed to 57 mm, and a sector 58 mm porous coated cup was impacted with satisfactory anteversion and inclination using anatomic landmarks and external reference points.  Primary stability of the cup was achieved.  Two screws were placed in the cup. A neutral 58/49 dual mobility liner was inserted and locked into the socket.  The femoral trial was removed, the canal irrigated, and a size 8 STD [place] stem inserted.  Axial and rotational stability of the stem was achieved.  A 28 mm +1.5 femoral head with 28/49 dual mobility bearing was impacted on a clean, dry trunion and the hip reduced.    The wound was thoroughly irrigated with 3 liters of an antibiotic solution and the posterior capsulotomy closed with a #5 ethibond.  The sciatic nerve was palpated and it did not feel to be under undue tension.  The iliotibial band and the gluteus sena fascia were closed with a #1 vicryl.  The skin was then closed with 2-0 vicryl and a running 4-0 monocryl suture.  The final instrument count was correct and a sterile bandage applied.  The patient was taken to recovery in a satisfactory condition.    There were no complications or evidence of intraoperative fracture. All counts were correct.    The first-assistant was critical to all steps of the operation, including retraction and leg stabilization during  exposure and bone preparation, as well as the deep and superficial wound closure.  Dr. Barrios performed and/or supervised the key portions of this surgical procedure, including evaluation of the bone cuts, reshaping of the bony elements, and insertion of the provisional and final components.    Postoperative Plan:  -patient will be weightbearing as tolerated with a walker with posterior hip precautions  -ASA 81mg BID for one month for DVT prophylaxis along with SCDs and early ambulation  -24 hours of IV antibiotics    Due patient and or surgical factors the patient will receive an Rx for cefadroxil 500mg BID x7 days after discharge per [place] data:   Elisa MM, Diana JE, Carito M, Polly RM. The [facility] Clinical Research Award: Extended Oral Antibiotics Prevent Periprosthetic Joint Infection in High-Risk Cases: 3855 Patients With 1-Year Follow-Up. J Arthroplasty. 2021 Jul;36(7S):S18-S25. doi: 10.1016/j.arth.2021.01.051. Epub 2021 Jan 23. PMID: [id number].      Signed by: Scot Barrios III, MD

## 2024-03-20 NOTE — PT/OT/SLP PROGRESS
Physical Therapy      Patient Name:  Chris Marc Nava   MRN:  [id number]    Patient not seen today secondary to late surgery. Will follow-up again in AM.

## 2024-03-20 NOTE — ASSESSMENT & PLAN NOTE
53 y.o. M sp L KAYLA with Dr. Barrios    Doing well, no issues  Plan for PT/OT today and discharge home with home health  Continue multimodal pain regimen  WBAT LLE, hip precautions  DVT ppx with SCDs at all times while inpatient, ASA 81 bid  Follow up in clinic as scheduled 4/4/24

## 2024-03-20 NOTE — OPERATIVE NOTE ADDENDUM
Certification of Assistant at Surgery       Surgery Date: 3/20/2024     Participating Surgeons:  Surgeon(s) and Role:     * Scot Barrios III, MD - Primary    Procedures:  Procedure(s) (LRB):  ARTHROPLASTY, HIP, TOTAL, POSTERIOR APPROACH: LEFT: DEPUY - ACTIS + PINNACLE (Left)    Assistant Surgeon's Certification of Necessity:  I understand that section 1842 (b) (6) (d) of the Social Security Act generally prohibits Medicare Part B reasonable charge payment for the services of assistants at surgery in teaching hospitals when qualified residents are available to furnish such services. I certify that the services for which payment is claimed were medically necessary, and that no qualified resident was available to perform the services. I further understand that these services are subject to post-payment review by the Medicare carrier.      Eric Tavares PA-C    03/20/2024  4:15 PM

## 2024-03-20 NOTE — NURSING
Report received. Care assumed. Patient arrived to unit AAOx4 in hospital bed from PACU. VSS, IVF infusing. Dressing to left hip, CDI.  Pt lying supine in bed. Pt denies pain or any other concerns at this time. See assessment. Patient oriented to room. Bed in lowest position, side rails up x2, bed wheels locked and call light within reach.  Pt instructed to call for assistance, verbalized understanding. NADN. Will continue to monitor.    Nurses Note -- 4 Eyes      3/20/2024   6:24 PM      Skin assessed during: Admit      [x] No Altered Skin Integrity Present    []Prevention Measures Documented      [] Yes- Altered Skin Integrity Present or Discovered   [] LDA Added if Not in Epic (Describe Wound)   [] New Altered Skin Integrity was Present on Admit and Documented in LDA   [] Wound Image Taken    Wound Care Consulted? No    Attending Nurse:  Rani Maradiaga RN/Staff Member:   Bindu

## 2024-03-20 NOTE — PT/OT/SLP PROGRESS
Occupational Therapy      Patient Name:  Chris Marc Nava   MRN:  [id number]    Patient not seen today secondary to late sx. Will follow-up tomorrow.    3/20/2024

## 2024-03-21 ENCOUNTER — PATIENT MESSAGE (OUTPATIENT)
Dept: ADMINISTRATIVE | Facility: OTHER | Age: 54
End: 2024-03-21
Payer: COMMERCIAL

## 2024-03-21 VITALS
WEIGHT: 300 LBS | HEIGHT: 69 IN | OXYGEN SATURATION: 99 % | BODY MASS INDEX: 44.43 KG/M2 | DIASTOLIC BLOOD PRESSURE: 68 MMHG | HEART RATE: 84 BPM | SYSTOLIC BLOOD PRESSURE: 125 MMHG | TEMPERATURE: 99 F | RESPIRATION RATE: 18 BRPM

## 2024-03-21 LAB
BUN SERPL-MCNC: 15 MG/DL (ref 6–30)
CHLORIDE SERPL-SCNC: 104 MMOL/L (ref 95–110)
CREAT SERPL-MCNC: 0.6 MG/DL (ref 0.5–1.4)
GLUCOSE SERPL-MCNC: 126 MG/DL (ref 70–110)
HCT VFR BLD CALC: 48 %PCV (ref 36–54)
POC IONIZED CALCIUM: 1.13 MMOL/L (ref 1.06–1.42)
POC TCO2 (MEASURED): 23 MMOL/L (ref 23–29)
POCT GLUCOSE: 104 MG/DL (ref 70–110)
POTASSIUM BLD-SCNC: 4.1 MMOL/L (ref 3.5–5.1)
SAMPLE: ABNORMAL
SODIUM BLD-SCNC: 141 MMOL/L (ref 136–145)

## 2024-03-21 PROCEDURE — 84132 ASSAY OF SERUM POTASSIUM: CPT

## 2024-03-21 PROCEDURE — 97116 GAIT TRAINING THERAPY: CPT

## 2024-03-21 PROCEDURE — 85014 HEMATOCRIT: CPT

## 2024-03-21 PROCEDURE — 63600175 PHARM REV CODE 636 W HCPCS

## 2024-03-21 PROCEDURE — 97535 SELF CARE MNGMENT TRAINING: CPT

## 2024-03-21 PROCEDURE — 97162 PT EVAL MOD COMPLEX 30 MIN: CPT

## 2024-03-21 PROCEDURE — 99900035 HC TECH TIME PER 15 MIN (STAT)

## 2024-03-21 PROCEDURE — 97530 THERAPEUTIC ACTIVITIES: CPT

## 2024-03-21 PROCEDURE — 84295 ASSAY OF SERUM SODIUM: CPT

## 2024-03-21 PROCEDURE — 25000003 PHARM REV CODE 250

## 2024-03-21 PROCEDURE — 97110 THERAPEUTIC EXERCISES: CPT

## 2024-03-21 PROCEDURE — 25000003 PHARM REV CODE 250: Performed by: STUDENT IN AN ORGANIZED HEALTH CARE EDUCATION/TRAINING PROGRAM

## 2024-03-21 PROCEDURE — 97165 OT EVAL LOW COMPLEX 30 MIN: CPT

## 2024-03-21 PROCEDURE — 82565 ASSAY OF CREATININE: CPT

## 2024-03-21 PROCEDURE — 99213 OFFICE O/P EST LOW 20 MIN: CPT | Mod: FS,,, | Performed by: ANESTHESIOLOGY

## 2024-03-21 PROCEDURE — 82330 ASSAY OF CALCIUM: CPT

## 2024-03-21 RX ORDER — CELECOXIB 200 MG/1
200 CAPSULE ORAL DAILY
Status: DISCONTINUED | OUTPATIENT
Start: 2024-03-21 | End: 2024-03-21 | Stop reason: HOSPADM

## 2024-03-21 RX ADMIN — ACETAMINOPHEN 1000 MG: 500 TABLET ORAL at 07:03

## 2024-03-21 RX ADMIN — CELECOXIB 200 MG: 200 CAPSULE ORAL at 09:03

## 2024-03-21 RX ADMIN — MUPIROCIN 1 G: 20 OINTMENT TOPICAL at 09:03

## 2024-03-21 RX ADMIN — CEFAZOLIN 2 G: 2 INJECTION, POWDER, FOR SOLUTION INTRAMUSCULAR; INTRAVENOUS at 08:03

## 2024-03-21 RX ADMIN — CEFAZOLIN 2 G: 2 INJECTION, POWDER, FOR SOLUTION INTRAMUSCULAR; INTRAVENOUS at 02:03

## 2024-03-21 RX ADMIN — METFORMIN HYDROCHLORIDE 500 MG: 500 TABLET, EXTENDED RELEASE ORAL at 07:03

## 2024-03-21 RX ADMIN — DOCUSATE SODIUM AND SENNOSIDES 1 TABLET: 8.6; 5 TABLET, FILM COATED ORAL at 09:03

## 2024-03-21 RX ADMIN — POLYETHYLENE GLYCOL 3350 17 G: 17 POWDER, FOR SOLUTION ORAL at 09:03

## 2024-03-21 RX ADMIN — FAMOTIDINE 20 MG: 20 TABLET, FILM COATED ORAL at 09:03

## 2024-03-21 RX ADMIN — METHOCARBAMOL 750 MG: 750 TABLET ORAL at 09:03

## 2024-03-21 RX ADMIN — ASPIRIN 81 MG: 81 TABLET, COATED ORAL at 09:03

## 2024-03-21 NOTE — PLAN OF CARE
Pt discharged from unit.  Pt aaox4, vss, no s/s of distress noted.  Dressing to left hip remains dry and intact with small spot of dried blood.  IV removed w/ catheter intact, no redness or swelling noted to area.  Discharge instructions given to pt and placed in d/c folder, pt verbalized understanding.  Home meds and f/u appts reviewed as well. Eqmt and meds delivered to bs prior to discharge. Blue Bracelet applied to pt's wrist and instructions given to call # on bracelet w/ any surgery related issues.   Pt left unit via w/c and was accompanied to front of hospital to meet ride. All personal belongings sent with pt.

## 2024-03-21 NOTE — PT/OT/SLP EVAL
"Occupational Therapy Evaluation  and Discharge Note    Name: Chris Nava  MRN: [id number]  Admitting Diagnosis: Avascular necrosis of bone of left hip 1 Day Post-Op  Recent Surgery: Procedure(s) (LRB):  ARTHROPLASTY, HIP, TOTAL, POSTERIOR APPROACH: LEFT: DEPUY - ACTIS + PINNACLE (Left) 1 Day Post-Op    Recommendations:     Discharge Recommendations: Low Intensity Therapy  Level of Assistance Recommended: 24 hours supervision  Discharge Equipment Recommendations: walker, rolling  Barriers to discharge: None    Assessment:     Chris Nava is a 53 y.o. male with a medical diagnosis of Avascular necrosis of bone of left hip. He presents with performance deficits affecting function including impaired self care skills, impaired functional mobility, orthopedic precautions. Pt was able to perform supine/sit T/F c S and Sit <> Stand Transfer with modified independence with rolling walker Bed <> Chair Transfer using Stand Pivot technique with modified independence with rolling walker Toilet Transfer Stand Pivot technique with modified independence with rolling walker and bedside commode.  Able to perform UB/LB dressing c modified independence  Educated pt on bathing, car transfers, and cryotherapy.       Rehab Prognosis: Good; patient would benefit from acute OT services to address these deficits and reach maximum level of function.    Plan:     Patient to be seen daily to address the above listed problems via self-care/home management, therapeutic activities, therapeutic exercises  Plan of Care Expires: 03/21/24  Plan of Care Reviewed with: patient, significant other    Subjective     Chief Complaint: L KAYLA  Patient Comments/Goals: "I don't have any pain."  Pain/Comfort:  Pain Rating 1: 5/10    Patients cultural, spiritual, Amish conflicts given the current situation: no    Social History:  Living Environment: Patient lives alone in a two story home with number of outside stair(s): 0, number of inside " stair(s): 14, bed/bath on first and second floor, can live on 1st floor, tub-shower combo, and walk-in shower  Prior Level of Function: Prior to admission, patient was independent.  Roles and Routines: Patient was driving and working prior to admission.  Equipment Used at Home: cane, straight, raised toilet, shower chair, hip kit  DME owned (not currently used): single point cane and shower chair  Assistance Upon Discharge: significant other    Objective:     Communicated with RN prior to session. Patient found supine with FCD, cryotherapy, telemetry upon OT entry to room.    General Precautions: Standard, fall   Orthopedic Precautions: LLE weight bearing as tolerated, LLE posterior precautions   Braces: N/A    Respiratory Status: Room air    Occupational Performance    Gait belt applied - Yes    Bed Mobility:   Supine to sit from left side of bed with contact guard assistance    Functional Mobility/Transfers:  Sit <> Stand Transfer with contact guard assistance with rolling walker  Bed <> Chair Transfer using Stand Pivot technique with contact guard assistance with rolling walker  Toilet Transfer Stand Pivot technique with contact guard assistance with rolling walker and bedside commode  Functional Mobility: Pt was able to walk to bathroom c CGA and RW.    Activities of Daily Living:  Grooming: modified independence to wash hands and brush teeth while standing at sink c RW.  Upper Body Dressing: modified independence to don shirt.  Lower Body Dressing: modified independence to don underwear, shorts, socks, and shoes c reacher, sock-aid, and long handled shoe horn.  Pt did not have clothes on first visit.  Returned to see pt once clothes arrived.  Toileting: modified independence for toilet hygiene.    Physical Exam:  Upper Extremity Range of Motion:     -       Right Upper Extremity: WFL  -       Left Upper Extremity: WFL  Upper Extremity Strength:    -       Right Upper Extremity: WFL  -       Left Upper  Extremity: WFL    [facility] 6 Click ADL:  AMPA Total Score: 23    Treatment & Education:  Educated on the importance of mobility to maximize recovery  Educated on the importance of OOB mobility within safe range in order to decrease adverse effects of prolonged bedrest  Educated on Posterior hip precautions - patient recalled 3 hip precautions  Educated on use of hip kit during LB dressing  Educated on safety with functional mobility; hand placement to ensure safe transfers to various surfaces in prep for ADLs  Educated on performing functional mobility and ADLs in adherence to orthopedic precautions  Educated on weight bearing status  Will continue to educate as needed      Patient clear to ambulate to/from bathroom with RN/PCT, assist x1 .    Patient left up in chair with all lines intact, call button in reach, and RN notified.    GOALS:   Multidisciplinary Problems       Occupational Therapy Goals          Problem: Occupational Therapy    Goal Priority Disciplines Outcome Interventions   Occupational Therapy Goal     OT, PT/OT Ongoing, Progressing    Description: Goals to be met by: 3/21/24     Patient will increase functional independence with ADLs by performing:    UE Dressing with Modified [place].  LE Dressing with Modified [place] and Assistive Devices as needed.  Grooming while standing at sink with Modified [place].  Toileting from bedside commode with Modified [place] for hygiene and clothing management.   Bathing from  shower chair/bench with Modified [place].  Toilet transfer to bedside commode with Modified [place].                         History:     Past Medical History:   Diagnosis Date    Abscess of finger of right hand 01/18/2024    Arthritis     Cellulitis of finger of right hand 01/17/2024    Hypertension     Kidney stones     Personal history of kidney stones 10/11/2023         Past Surgical History:   Procedure Laterality Date    CYSTOSCOPY W/ LASER LITHOTRIPSY       JUSTIN surgery      removed uvula, tonsils and adenoids for JUSTIN    THYROID CYST EXCISION      2017    VASECTOMY         Time Tracking:     OT Date of Treatment: 03/21/24  OT Start Time: 0856 (1012)  OT Stop Time: 0938 (1023)  OT Total Time (min): 42 min  OT Second Start Time: 1012  OT Second Stop Time: 1023  OT Total Time: 53 min      Billable Minutes: Evaluation 13, Self Care/Home Management 30, and Therapeutic Activity 10    3/21/2024

## 2024-03-21 NOTE — PLAN OF CARE
Problem: Physical Therapy  Goal: Physical Therapy Goal  Description: Pt met goals at [facility] to demonstrate safe level of mobility for d/c home with significant other and her dtr to assist PRN.     Outcome: Adequate for Care Transition

## 2024-03-21 NOTE — PLAN OF CARE
Problem: Occupational Therapy  Goal: Occupational Therapy Goal  Description: Goals to be met by: 3/21/24     Patient will increase functional independence with ADLs by performing:    UE Dressing with Modified [place].  LE Dressing with Modified [place] and Assistive Devices as needed.  Grooming while standing at sink with Modified [place].  Toileting from bedside commode with Modified [place] for hygiene and clothing management.   Bathing from  shower chair/bench with Modified [place].  Toilet transfer to bedside commode with Modified [place].    Outcome: Ongoing, Progressing

## 2024-03-21 NOTE — PROGRESS NOTES
Acute Pain Service and Perioperative Surgical Home Progress Note    Interval history  Chris Marc Nava is a 53 y.o., male, status post arthroplasty of the left hip with no acute events overnight.  Pain is well controlled.  Patient is able to elevate the leg off of the bed.  Good sensation and good range of motion.  Good plantar and dorsiflexion.    Surgery:  Procedure(s) (LRB):  ARTHROPLASTY, HIP, TOTAL, POSTERIOR APPROACH: LEFT: DEPUY - ACTIS + PINNACLE (Left)    Post Op Day #: 1    Problem List:    Active Hospital Problems    Diagnosis  POA    *Avascular necrosis of bone of left hip [M87.052]  Yes    HTN (hypertension) [I10]  Yes      Resolved Hospital Problems   No resolved problems to display.       Subjective:       General appearance of alert, oriented, no complaints   Pain with rest: 2    Numbers   Pain with movement: 4    Numbers   Side Effects    1. Pruritis No    2. Nausea Yes    3. Motor Blockade No, 0=Ability to raise lower extremities off bed    4. Sedation No, 1=awake and alert    Schedule Medications:    acetaminophen  1,000 mg Oral Q6H    allopurinoL  200 mg Oral Nightly    aspirin  81 mg Oral BID    ceFAZolin (Ancef) IV (PEDS and ADULTS)  2 g Intravenous Q6H    celecoxib  200 mg Oral Daily    DULoxetine  20 mg Oral Nightly    DULoxetine  60 mg Oral Nightly    famotidine  20 mg Oral BID    lisinopriL  20 mg Oral Nightly    metFORMIN  500 mg Oral QAM    methocarbamoL  750 mg Oral TID    mupirocin  1 g Nasal BID    polyethylene glycol  17 g Oral Daily    pregabalin  75 mg Oral QHS    senna-docusate 8.6-50 mg  1 tablet Oral BID        Continuous Infusions:   sodium chloride 0.9% 150 mL/hr at 03/20/24 2352        PRN Medications:  dextrose 10%, dextrose 10%, glucagon (human recombinant), glucose, glucose, insulin aspart U-100, melatonin, morphine, naloxone, ondansetron, oxyCODONE, oxyCODONE, polyethylene glycol, prochlorperazine       Antibiotics:  Antibiotics (From admission, onward)      Start     " Stop Route Frequency Ordered    03/20/24 2100  mupirocin 2 % ointment 1 g         03/25/24 2059 Nasl 2 times daily 03/20/24 1754 03/20/24 2000  ceFAZolin 2 g in sodium chloride 0.9 % 50 mL IVPB (MB+)  (MEDICATIONS - ANTIBIOTICS NO PENICILLIN ALLERGY TOTAL HIP PATHWAY POST-OP PANEL)         03/21/24 1359 IV Every 6 hours (non-standard times) 03/20/24 1604               Objective:         Vital Signs (Most Recent):  Temp: 97.8 °F (36.6 °C) (03/21/24 0402)  Pulse: 77 (03/21/24 0402)  Resp: 18 (03/21/24 0402)  BP: 113/62 (03/21/24 0402)  SpO2: 97 % (03/21/24 0402) Vital Signs Range (Last 24H):  Temp:  [97.3 °F (36.3 °C)-98.3 °F (36.8 °C)]   Pulse:  [70-96]   Resp:  [14-41]   BP: (100-136)/(53-83)   SpO2:  [95 %-100 %]          I & O (Last 24H):  Intake/Output Summary (Last 24 hours) at 3/21/2024 0516  Last data filed at 3/21/2024 0238  Gross per 24 hour   Intake 2850 ml   Output 2400 ml   Net 450 ml       Physical Exam:    GA: Alert, comfortable, no acute distress.   Pulmonary: Clear to auscultation . Normal respiratory ratei.  Cardiac: regular rate and rhythm.          Laboratory: reviewed in chart  CBC:   Recent Labs     03/21/24  0331   HCT 48       BMP: No results for input(s): "NA", "K", "CO2", "CL", "BUN", "CREATININE", "GLU", "MG", "PHOS", "CALCIUM" in the last 72 hours.    No results for input(s): "PT", "INR", "PROTIME", "APTT" in the last 72 hours.          Assessment:         Pain control adequate    Plan:  1) Pain: Multimodal pain regimen ordered which includes acetaminophen, celecoxib, pregabalin, and prn oxycodone.  Well controlled on current regimen.  Will continue to monitor.    2) hypertension, continue home regimen   3) FEN/GI: Tolerating regular diet.     4) Dispo: Pt working well with PT/OT. Case management and SW following along for setting up home health and physical therapy. Plan to discharge home this am.              Evaluator Jasiel Cervantes PAJolynnC                "

## 2024-03-21 NOTE — PT/OT/SLP EVAL
Physical Therapy Evaluation and Treatment and Discharge Summary    Patient Name: Chris Nava   MRN: [id number]  Recent Surgery: Procedure(s) (LRB):  ARTHROPLASTY, HIP, TOTAL, POSTERIOR APPROACH: LEFT: DEPUY - ACTIS + PINNACLE (Left) 1 Day Post-Op    Recommendations:     Discharge Recommendations: Low Intensity Therapy   Discharge Equipment Recommendations: walker, rolling   Barriers to discharge:  lives alone in 2 story home    Patient demonstrates a mobility limitation that significantly impairs their ability to participate in one or more mobility related activities of daily living. Patient's mobility limitation cannot be sufficiently resolved with the use of a cane, but can be sufficiently resolved with the use of a rolling walker.The use of a rolling walker will considerably improve their ability to participate in MRADLs. Patient will use the walker on a regular basis at home.     Assessment:     Chris Nava is a 53 y.o. male admitted with a medical diagnosis of Avascular necrosis of bone of left hip. He presents with the following impairments/functional limitations: weakness, impaired endurance, impaired functional mobility, gait instability, pain, decreased ROM, edema, orthopedic precautions. Pt presents s/p L KAYLA with expected post-op deficits.  Pt did really well with PT today and was able to amb 250 feet x 2 separate gait trials with RW and SBA .    He ascended/descended 6' curb step with RW and CGA   Pt is ready for d/c home today from PT standpoint with his significant other  to assist PRN and  will benefit from HHPT for cont PT to maximize  functional recovery, strength and ROM.        Rehab Prognosis: Good; patient would benefit from cont PT services post d/c to address these deficits and reach maximum level of function.    Plan:     During this hospitalization, patient to be d/c home today with his significant other and her dtr to assist PRN and pt to receive HHPT to address the above  "listed problems via gait training, therapeutic activities, therapeutic exercises    Plan of Care Expires: 04/20/24    Subjective     Chief Complaint: "this feels so great compared to before"  Patient Comments/Goals: to go home  Pain/Comfort:  Pain Rating 1:  (Pt did not give pain scale rating)  Location - Side 1: Left  Location 1: hip  Pain Addressed 1: Pre-medicate for activity, Reposition, Distraction    Social History:  Living Environment: Patient lives alone in a 2 story home with number of outside stair(s): entry only, number of inside stair(s): full flight with R rail only, bed/bath on 2nd floor, can live on 1st floor, tub-shower combo, and walk-in shower  Prior Level of Function: Prior to admission, patient ambulated community distances using straight cane x 2  Equipment Used at Home: cane, straight, raised toilet, shower chair  DME owned (not currently used): rolling walker- issued today  Assistance Upon Discharge: significant other and and her dtr    Objective:     Communicated with RNKeke, prior to session. Patient found up in chair with cryotherapy, peripheral IV, telemetry upon PT entry to room. Pt's significant other and her dtr present in the room and for entire PT session.    General Precautions: Standard, fall   Orthopedic Precautions: LLE weight bearing as tolerated, LLE posterior precautions (no extremes of motion, hip flexion 0-90)   Braces: N/A    Respiratory Status: Room air    Exams:  RLE ROM: WFL  RLE Strength: WFL  LLE ROM: WFL except at hip due to hip precautions  LLE Strength: WFL  Cognitive: Patient is oriented to Person, Place, Time, Situation  Gross Motor Coordination: WFL  Postural Exam: Patient presented with the following abnormalities:    -       Rounded shoulders  Sensation:    -       Intact    Functional Mobility:  Gait belt applied - Yes  Bed Mobility  Supine to Sit: stand by assistance for LE management  Sit to Supine: stand by assistance for LE management and " "technique  Transfers  Sit to Stand: stand by assistance with rolling walker and with cues for hand placement and foot placement  Gait  Patient ambulated 250 feet x 2 separate gait training trials with rolling walker and stand by assistance. Patient required cues for heel strike, sequencing, step through gait pattern, and to decrease rosa  to increase independence and safety. Patient required cues ~ 25% of the time.  Balance  Sitting: GOOD: Maintains balance through MODERATE excursions of active trunk movement  Standing: FAIR+: Needs CLOSE SUPERVISION during gait and is able to right self with minor LOB  Stairs: Pt ascended/descended 6" curb step with Rolling Walker with no handrails with Contact Guard Assistance.   Stairs: Pt ascended/descended 6" curb step backwards with Rolling Walker with no handrails with Contact Guard Assistance. To simulate pt using step at home to get into his elevated height bed.    Therapeutic Activities and Exercises:  Patient educated on role of acute care PT and PT POC, safety while in hospital including calling nurse for mobility, and call light usage.  Educated about importance of OOB mobility and remaining up in chair most of the day.  PT reviewed post hip precautions with pt  and his caregivers and applications to mobility and pt demonstrated good understanding back to PT. Pt was able to verbalize back to PT good understanding of his precautions.  Pt instructed on and performed therapeutic ex's for post KAYLA HEP (QS, AP, GS) x 10 reps x 3 sets each for muscle strengthening, endurance and increase knee ROM. He demonstrated good understanding back to PT. Patient required skilled PT for instruction of exercises and appropriate cues to perform exercises safely and appropriately.  Pt issued written handout of HEP.   PT reviewed and demonstrated car transfers with pt and caregiver and answered all questions.  Pt and caregiver verbalized good understanding back to PT.   Pt ed on use of " cryotherapy for edema and pain control, and on safety awareness with use of RW in the home, including pet management and pt verbalized good understanding back to PT.   PT answered all of pt's questions and reviewed expectations for mobility at home and importance of safety and using the RW for all gait and transfers. He verbalized good understanding back to PT.    AM-PAC 6 CLICK MOBILITY  Total Score:18    Patient left up in chair with all lines intact, call button in reach, RN notified, and significant other and her dtr present.    GOALS:   Multidisciplinary Problems       Physical Therapy Goals          Problem: Physical Therapy    Goal Priority Disciplines Outcome Goal Variances Interventions   Physical Therapy Goal     PT, PT/OT Adequate for Care Transition     Description: Pt met goals at [facility] to demonstrate safe level of mobility for d/c home with significant other and her dtr to assist PRN.                          History:     Past Medical History:   Diagnosis Date    Abscess of finger of right hand 01/18/2024    Arthritis     Cellulitis of finger of right hand 01/17/2024    Hypertension     Kidney stones     Personal history of kidney stones 10/11/2023       Past Surgical History:   Procedure Laterality Date    CYSTOSCOPY W/ LASER LITHOTRIPSY      JUSTIN surgery      removed uvula, tonsils and adenoids for JUSTIN    THYROID CYST EXCISION      2017    VASECTOMY         Time Tracking:     PT Received On: 03/21/24  PT Start Time: 1138  PT Stop Time: 1219  PT Total Time (min): 41 min     Billable Minutes: Evaluation 10, Gait Training 20, and Therapeutic Exercise 11    3/21/2024

## 2024-03-21 NOTE — PLAN OF CARE
Problem: Adult Inpatient Plan of Care  Goal: Plan of Care Review  Outcome: Ongoing, Progressing  Flowsheets (Taken 3/21/2024 0354)  Plan of Care Reviewed With: patient  Goal: Patient-Specific Goal (Individualized)  Outcome: Ongoing, Progressing  Flowsheets (Taken 3/21/2024 0354)  Anxieties, Fears or Concerns: Generalized  Individualized Care Needs: Keep patient updated on Plan of Care     Problem: Pain Acute  Goal: Acceptable Pain Control and Functional Ability  Outcome: Ongoing, Progressing  Intervention: Develop Pain Management Plan  Flowsheets (Taken 3/21/2024 0354)  Pain Management Interventions:   breathing exercises utilized   care clustered   cold applied   diversional activity provided   medication offered but refused   pain management plan reviewed with patient/caregiver   position adjusted   quiet environment facilitated   relaxation techniques promoted  Intervention: Prevent or Manage Pain  Flowsheets (Taken 3/21/2024 0354)  Sleep/Rest Enhancement:   awakenings minimized   noise level reduced   regular sleep/rest pattern promoted   relaxation techniques promoted   room darkened  Sensory Stimulation Regulation:   auditory stimulation minimized   care clustered   lighting decreased   quiet environment promoted   tactile stimulation minimized   visual stimulation minimized  Bowel Elimination Promotion: adequate fluid intake promoted  Medication Review/Management:   medications reviewed   high-risk medications identified     Problem: Adjustment to Surgery (Hip Arthroplasty)  Goal: Optimal Coping  Outcome: Ongoing, Progressing  Intervention: Support Psychosocial Response to Surgery and Mobility Changes  Flowsheets (Taken 3/21/2024 0354)  Supportive Measures:   active listening utilized   positive reinforcement provided   verbalization of feelings encouraged     Problem: Bleeding (Hip Arthroplasty)  Goal: Absence of Bleeding  Outcome: Ongoing, Progressing  Intervention: Monitor and Manage Bleeding  Flowsheets  (Taken 3/21/2024 0354)  Bleeding Management: dressing monitored     Problem: Respiratory Compromise (Hip Arthroplasty)  Goal: Effective Oxygenation and Ventilation  Outcome: Ongoing, Progressing  Intervention: Optimize Oxygenation and Ventilation  Flowsheets (Taken 3/21/2024 0354)  Airway/Ventilation Management: airway patency maintained  Head of Bed (HOB) Positioning: HOB at 15 degrees

## 2024-03-21 NOTE — PROGRESS NOTES
[facility])  Orthopedics  Progress Note    Patient Name: Chris Nava  MRN: [id number]  Admission Date: 3/20/2024  Hospital Length of Stay: 0 days  Attending Provider: Scot Barrios III, MD  Primary Care Provider: Shira Rodríguez NP  Follow-up For: Procedure(s) (LRB):  ARTHROPLASTY, HIP, TOTAL, POSTERIOR APPROACH: LEFT: DEPUY - ACTIS + PINNACLE (Left)    Post-Operative Day: 1 Day Post-Op  Subjective:     Principal Problem:Avascular necrosis of bone of left hip    Principal Orthopedic Problem: L KAYLA 3/20    Interval History: Patient was seen and examined at bedside s/p L KAYLA. AIMEE. He reports that pain is well controlled. Tolerating PO. Patient has not worked with PT yet.. Bowel and bladder function have returned. Patient states that He is ready for discharge today after working with PT.       Review of patient's allergies indicates:  No Known Allergies    Current Facility-Administered Medications   Medication    0.9%  NaCl infusion    acetaminophen tablet 1,000 mg    allopurinoL tablet 200 mg    aspirin EC tablet 81 mg    ceFAZolin 2 g in sodium chloride 0.9 % 50 mL IVPB (MB+)    celecoxib capsule 200 mg    dextrose 10% bolus 125 mL 125 mL    dextrose 10% bolus 250 mL 250 mL    DULoxetine DR capsule 20 mg    DULoxetine DR capsule 60 mg    famotidine tablet 20 mg    glucagon (human recombinant) injection 1 mg    glucose chewable tablet 16 g    glucose chewable tablet 24 g    insulin aspart U-100 pen 0-5 Units    lisinopriL tablet 20 mg    melatonin tablet 6 mg    metFORMIN ER 24hr tablet 500 mg    methocarbamoL tablet 750 mg    morphine injection 2 mg    mupirocin 2 % ointment 1 g    naloxone 0.4 mg/mL injection 0.02 mg    ondansetron injection 4 mg    oxyCODONE immediate release tablet 5 mg    oxyCODONE immediate release tablet Tab 10 mg    polyethylene glycol packet 17 g    polyethylene glycol packet 17 g    pregabalin capsule 75 mg    prochlorperazine injection Soln 5 mg     "senna-docusate 8.6-50 mg per tablet 1 tablet     Objective:     Vital Signs (Most Recent):  Temp: 97.8 °F (36.6 °C) (03/21/24 0402)  Pulse: 77 (03/21/24 0402)  Resp: 18 (03/21/24 0402)  BP: 113/62 (03/21/24 0402)  SpO2: 97 % (03/21/24 0402) Vital Signs (24h Range):  Temp:  [97.3 °F (36.3 °C)-98.3 °F (36.8 °C)] 97.8 °F (36.6 °C)  Pulse:  [70-96] 77  Resp:  [14-41] 18  SpO2:  [95 %-100 %] 97 %  BP: (100-136)/(53-83) 113/62     Weight: 136.1 kg (300 lb)  Height: 5' 9" (175.3 cm)  Body mass index is 44.3 kg/m².      Intake/Output Summary (Last 24 hours) at 3/21/2024 0611  Last data filed at 3/21/2024 0238  Gross per 24 hour   Intake 2850 ml   Output 2400 ml   Net 450 ml        Ortho/SPM Exam  NAD  A&O x3   Breathing comfortably w/o distress     LLE Exam:    - Incision with dressing in place. Clean, dry, and intact   - Skin intact throughout  - Minimal Swelling  - NonTTP throughout  - AROM and PROM of the hip, knee, ankle, and foot intact without pain  - TA/EHL/Gastroc/FHL assessed in isolation without deficit  - SILT throughout  - Compartments soft and compressible   - DP and PT palpated  - Capillary Refill <3s       Significant Labs: All pertinent labs within the past 24 hours have been reviewed.    Significant Imaging: I have reviewed and interpreted all pertinent imaging results/findings.  Assessment/Plan:     * Avascular necrosis of bone of left hip  53 y.o. M sp L KAYLA with Dr. Barrios    Doing well, no issues  Plan for PT/OT today and discharge home with home health  Continue multimodal pain regimen  WBAT LLE, hip precautions  DVT ppx with SCDs at all times while inpatient, ASA 81 bid  Follow up in clinic as scheduled 4/4/24        HTN (hypertension)  Home meds          KYLAH Alfred MD  Orthopedics  [place] [facility] ([facility])    "

## 2024-03-21 NOTE — PLAN OF CARE
Problem: Pain Acute  Goal: Acceptable Pain Control and Functional Ability  Intervention: Prevent or Manage Pain  Flowsheets (Taken 3/21/2024 0928)  Sensory Stimulation Regulation:   care clustered   lighting decreased   quiet environment promoted  Medication Review/Management: medications reviewed     Problem: Pain Acute  Goal: Acceptable Pain Control and Functional Ability  Intervention: Optimize Psychosocial Wellbeing  Flowsheets (Taken 3/21/2024 0928)  Supportive Measures:   active listening utilized   positive reinforcement provided     Problem: Adjustment to Surgery (Hip Arthroplasty)  Goal: Optimal Coping  Intervention: Support Psychosocial Response to Surgery and Mobility Changes  Flowsheets (Taken 3/21/2024 0928)  Supportive Measures:   active listening utilized   positive reinforcement provided     Problem: Bleeding (Hip Arthroplasty)  Goal: Absence of Bleeding  Intervention: Monitor and Manage Bleeding  Flowsheets (Taken 3/21/2024 0928)  Bleeding Management: dressing monitored     Problem: Infection (Hip Arthroplasty)  Goal: Absence of Infection Signs and Symptoms  Intervention: Prevent or Manage Infection  Flowsheets (Taken 3/21/2024 0928)  Infection Management: aseptic technique maintained     Problem: Pain (Hip Arthroplasty)  Goal: Acceptable Pain Control  Intervention: Prevent or Manage Pain  Flowsheets (Taken 3/21/2024 0928)  Pain Management Interventions:   care clustered   cold applied   pain management plan reviewed with patient/caregiver     Problem: Fall Injury Risk  Goal: Absence of Fall and Fall-Related Injury  Intervention: Promote Injury-Free Environment  Flowsheets (Taken 3/21/2024 0928)  Safety Promotion/Fall Prevention:   assistive device/personal item within reach   Fall Risk reviewed with patient/family   lighting adjusted   medications reviewed   nonskid shoes/socks when out of bed   side rails raised x 2   instructed to call staff for mobility   Plan of care reviewed with pt,  verbalized understanding. Medications and possible side effects reviewed and administered as ordered.  Purposeful rounding completed.  Safety precautions maintained.  Call light placed within reach.  Preparing for discharge.

## 2024-03-21 NOTE — PLAN OF CARE
[facility]  Discharge Final Note    Primary Care Provider: Shira Rodríguez NP    Expected Discharge Date: 3/21/2024    Final Discharge Note (most recent)       Final Note - 03/21/24 1534          Final Note    Assessment Type Final Discharge Note     Antic[place] [facility] [place]     What phone number can be called within the next 1-3 days to see how you are doing after discharge? --   [phone]       Post-Acute Status    Post-Acute Authorization Home Health     Home Health Status Set-up Complete/Auth obtained                     Important Message from Medicare           Future Appointments   Date Time Provider Department Center   3/22/2024  2:30 PM TELEMED NURSE, [facility] ORTHO [facility] ORTHO [address]   4/4/2024  2:00 PM Eric Tavares PA-C [facility] ORTHO [facility] [facility]   4/24/2024  2:40 PM [facility]   5/2/2024 11:00 AM [facility] OIC-XRAY [facility]   5/2/2024 11:40 AM Scot Barrios III, MD [facility] ORTHO [address]   6/13/2024 11:40 AM Scot Barrios III, MD [facility]     Patient discharged home to care of family and [facility] on 3/21/24.  Hillary Painter RNCM  Case Management  [facility]  [phone]

## 2024-03-21 NOTE — ANESTHESIA POSTPROCEDURE EVALUATION
Anesthesia Post Evaluation    Patient: Chris Nava    Procedure(s) Performed: Procedure(s) (LRB):  ARTHROPLASTY, HIP, TOTAL, POSTERIOR APPROACH: LEFT: DEPUY - ACTIS + PINNACLE (Left)    Final Anesthesia Type: CSE      Patient location during evaluation: PACU  Patient participation: Yes- Able to Participate  Level of consciousness: awake and alert and oriented  Post-procedure vital signs: reviewed and stable  Pain management: adequate  Airway patency: patent    PONV status at discharge: No PONV  Anesthetic complications: no      Cardiovascular status: blood pressure returned to baseline and hemodynamically stable  Respiratory status: unassisted, room air and spontaneous ventilation  Hydration status: euvolemic  Follow-up not needed.              Vitals Value Taken Time   /62 03/21/24 0402   Temp 36.6 °C (97.8 °F) 03/21/24 0402   Pulse 77 03/21/24 0402   Resp 18 03/21/24 0402   SpO2 97 % 03/21/24 0402         Event Time   Out of Recovery 16:46:00         Pain/Chapis Score: Pain Rating Prior to Med Admin: 8 (3/20/2024 11:52 PM)  Pain Rating Post Med Admin: 7 (3/21/2024 12:52 AM)  Chapis Score: 9 (3/20/2024  4:30 PM)

## 2024-03-21 NOTE — SUBJECTIVE & OBJECTIVE
Principal Problem:Avascular necrosis of bone of left hip    Principal Orthopedic Problem: L KAYLA 3/20    Interval History: Patient was seen and examined at bedside s/p L KAYLA. AIMEE. He reports that pain is well controlled. Tolerating PO. Patient has not worked with PT yet.. Bowel and bladder function have returned. Patient states that He is ready for discharge today after working with PT.        Review of patient's allergies indicates:  No Known Allergies    Current Facility-Administered Medications   Medication    0.9%  NaCl infusion    acetaminophen tablet 1,000 mg    allopurinoL tablet 200 mg    aspirin EC tablet 81 mg    ceFAZolin 2 g in sodium chloride 0.9 % 50 mL IVPB (MB+)    celecoxib capsule 200 mg    dextrose 10% bolus 125 mL 125 mL    dextrose 10% bolus 250 mL 250 mL    DULoxetine DR capsule 20 mg    DULoxetine DR capsule 60 mg    famotidine tablet 20 mg    glucagon (human recombinant) injection 1 mg    glucose chewable tablet 16 g    glucose chewable tablet 24 g    insulin aspart U-100 pen 0-5 Units    lisinopriL tablet 20 mg    melatonin tablet 6 mg    metFORMIN ER 24hr tablet 500 mg    methocarbamoL tablet 750 mg    morphine injection 2 mg    mupirocin 2 % ointment 1 g    naloxone 0.4 mg/mL injection 0.02 mg    ondansetron injection 4 mg    oxyCODONE immediate release tablet 5 mg    oxyCODONE immediate release tablet Tab 10 mg    polyethylene glycol packet 17 g    polyethylene glycol packet 17 g    pregabalin capsule 75 mg    prochlorperazine injection Soln 5 mg    senna-docusate 8.6-50 mg per tablet 1 tablet     Objective:     Vital Signs (Most Recent):  Temp: 97.8 °F (36.6 °C) (03/21/24 0402)  Pulse: 77 (03/21/24 0402)  Resp: 18 (03/21/24 0402)  BP: 113/62 (03/21/24 0402)  SpO2: 97 % (03/21/24 0402) Vital Signs (24h Range):  Temp:  [97.3 °F (36.3 °C)-98.3 °F (36.8 °C)] 97.8 °F (36.6 °C)  Pulse:  [70-96] 77  Resp:  [14-41] 18  SpO2:  [95 %-100 %] 97 %  BP: (100-136)/(53-83) 113/62     Weight: 136.1 kg  "(300 lb)  Height: 5' 9" (175.3 cm)  Body mass index is 44.3 kg/m².      Intake/Output Summary (Last 24 hours) at 3/21/2024 0611  Last data filed at 3/21/2024 0238  Gross per 24 hour   Intake 2850 ml   Output 2400 ml   Net 450 ml        Ortho/SPM Exam  NAD  A&O x3   Breathing comfortably w/o distress     LLE Exam:    - Incision with dressing in place. Clean, dry, and intact   - Skin intact throughout  - Minimal Swelling  - NonTTP throughout  - AROM and PROM of the hip, knee, ankle, and foot intact without pain  - TA/EHL/Gastroc/FHL assessed in isolation without deficit  - SILT throughout  - Compartments soft and compressible   - DP and PT palpated  - Capillary Refill <3s       Significant Labs: All pertinent labs within the past 24 hours have been reviewed.    Significant Imaging: I have reviewed and interpreted all pertinent imaging results/findings.  " No

## 2024-03-22 ENCOUNTER — TELEPHONE (OUTPATIENT)
Dept: ORTHOPEDICS | Facility: CLINIC | Age: 54
End: 2024-03-22
Payer: COMMERCIAL

## 2024-03-22 PROCEDURE — G0180 MD CERTIFICATION HHA PATIENT: HCPCS | Mod: ,,,

## 2024-03-22 NOTE — DISCHARGE SUMMARY
[facility]  Orthopedics  Discharge Summary      Patient Name: Chris Nava  MRN: [id number]  Admission Date: 3/20/2024  Hospital Length of Stay: 0 days  Discharge Date and Time: 3/21/2024 12:43 PM  Attending Physician: No att. providers found   Discharging Provider: KYLAH Alfred MD  Primary Care Provider: Shira Rodríguez NP    HPI:   Chris Nava is a 53 y.o. male with Left hip pain.  Pain is worse with activity and weight bearing.  Patient has experienced interference of activities of daily living due to increased pain and decreased range of motion. Patient has failed non-operative treatment including NSAIDs, as well as greater than 3 months of activity modification. Chris Nava ambulates using assistive device .      Relevant medical conditions of significance in perioperative period:  HTN- on medication managed by PCP  JUSTIN- monitored by PCP    Procedure(s) (LRB):  ARTHROPLASTY, HIP, TOTAL, POSTERIOR APPROACH: LEFT: DEPUY - ACTIS + PINNACLE (Left)      Hospital Course:  On 3/20, the patient arrived to the [facility] for proper pre-operative management.  Upon completion of pre-operative preparation, the patient was taken back to the operative theatre. L KAYLA was performed without complication and the patient was transported to the post anesthesia care unit in stable condition.  After appropriate recovery from the anaesthetic agents used during the surgery, the patient was then transported to the hospital inpatient floor.  The interim of the hospital stay from arrival on the floor up to discharge has been uncomplicated. The patient has tolerated regular diet.  The patient's pain has been controlled using a multimodal approach. Currently, the patient's pain is well controlled on an oral regimen.  The patient has been voiding without difficulty.  The patient began participation in physical therapy after surgery and has progressed throughout the  entire hospital stay.  Currently, the patient's progress is sufficient to allow the them to be discharged to home safely.  The patient agrees with this assessment and desires a discharge today.      Goals of Care Treatment Preferences:             Significant Diagnostic Studies: No pertinent studies.    Pending Diagnostic Studies:       Procedure Component Value Units Date/Time    Specimen to Pathology, Surgery Orthopedics [[id number]] Collected: 03/20/24 1500    Order Status: Sent Lab Status: In process Updated: 03/21/24 0803    Specimen: Tissue           Final Active Diagnoses:    Diagnosis Date Noted POA    PRINCIPAL PROBLEM:  Avascular necrosis of bone of left hip [M87.052] 03/06/2024 Yes    HTN (hypertension) [I10] 03/06/2024 Yes      Problems Resolved During this Admission:      Discharged Condition: good    Disposition: Home or Self Care    Follow Up:    Patient Instructions:      Activity as tolerated     Lifting restrictions   Order Comments: No strenuous exercise or lifting of > 10 lbs     Weight bearing as tolerated     No driving, operating heavy equipment or signing legal documents while taking pain medication     Leave dressing on - Keep it clean, dry, and intact until clinic visit   Order Comments: Do not remove surgical dressing for 2 weeks post-op. This will be done only by MD at initial post-op visit. If dressing is completely saturated, replace with identical dressing - silver-impregnated hydrocolloid dressing.    Do not get dressings wet. Do not shower.    If dressing continues to be saturated or there are signs of infection, please call [facility] [phone] for further instructions and to make appt to be seen.     Call MD for: fluid/liquid/drainage on dressing     Call MD for:  temperature >100.4     Call MD for:  persistent nausea and vomiting     Call MD for:  severe uncontrolled pain     Call MD for:  difficulty breathing, headache or visual disturbances     Call MD for:  redness,  tenderness, or signs of infection (pain, swelling, redness, odor or green/yellow discharge around incision site)     Call MD for:  hives     Call MD for:  persistent dizziness or light-headedness     Call MD for:  extreme fatigue     Ok to shower at home, running water only, towel dry, use shower chair for safety, no soaking in a tub or pool for one month.     Medications:  Reconciled Home Medications:      Medication List        CHANGE how you take these medications      celecoxib 200 MG capsule  Commonly known as: CeleBREX  Take 1 capsule (200 mg total) by mouth once daily.  What changed: Another medication with the same name was removed. Continue taking this medication, and follow the directions you see here.     * DULoxetine 60 MG capsule  Commonly known as: CYMBALTA  Take 1 capsule (60 mg total) by mouth once daily.  What changed: when to take this     * DULoxetine 20 MG capsule  Commonly known as: CYMBALTA  Take 1 capsule (20 mg total) by mouth once daily.  What changed: when to take this           * This list has 2 medication(s) that are the same as other medications prescribed for you. Read the directions carefully, and ask your doctor or other care provider to review them with you.                CONTINUE taking these medications      acetaminophen 650 MG Tbsr  Commonly known as: TYLENOL  Take 1 tablet (650 mg total) by mouth every 8 (eight) hours.     allopurinoL 100 MG tablet  Commonly known as: ZYLOPRIM  Take 200 mg by mouth nightly.     aspirin 81 MG EC tablet  Commonly known as: ECOTRIN  Take 1 tablet (81 mg total) by mouth 2 (two) times a day.     cefadroxil 500 MG Cap  Commonly known as: DURICEF  Take 1 capsule (500 mg total) by mouth every 12 (twelve) hours.     lisinopriL 20 MG tablet  Commonly known as: PRINIVIL,ZESTRIL  Take 20 mg by mouth nightly.     metFORMIN 500 MG ER 24hr tablet  Commonly known as: GLUCOPHAGE-XR  Take 500 mg by mouth every morning.     methocarbamoL 750 MG Tab  Commonly  known as: ROBAXIN  Take 1 tablet (750 mg total) by mouth 4 (four) times daily as needed (muscle spasms).     MULTI VITAMIN ORAL  Take 1 tablet by mouth once daily.     oxyCODONE 5 MG immediate release tablet  Commonly known as: ROXICODONE  Take 1 tablet every 4-6 hours as needed for pain     pantoprazole 40 MG tablet  Commonly known as: PROTONIX  Take 1 tablet (40 mg total) by mouth once daily.     potassium citrate 15 mEq Tbsr  Commonly known as: UROCIT-K 15  Take 1 tablet by mouth nightly.     SENEXON-S 8.6-50 mg per tablet  Generic drug: senna-docusate 8.6-50 mg  Take 1 tablet by mouth once daily.              KYLAH Alfred MD  Orthopedics  [facility])

## 2024-03-23 ENCOUNTER — PATIENT MESSAGE (OUTPATIENT)
Dept: ADMINISTRATIVE | Facility: OTHER | Age: 54
End: 2024-03-23
Payer: COMMERCIAL

## 2024-03-24 ENCOUNTER — PATIENT MESSAGE (OUTPATIENT)
Dept: ADMINISTRATIVE | Facility: OTHER | Age: 54
End: 2024-03-24
Payer: COMMERCIAL

## 2024-03-25 ENCOUNTER — PATIENT MESSAGE (OUTPATIENT)
Dept: ADMINISTRATIVE | Facility: OTHER | Age: 54
End: 2024-03-25
Payer: COMMERCIAL

## 2024-03-25 ENCOUNTER — OFFICE VISIT (OUTPATIENT)
Dept: ORTHOPEDICS | Facility: CLINIC | Age: 54
End: 2024-03-25
Payer: COMMERCIAL

## 2024-03-25 DIAGNOSIS — Z51.89 VISIT FOR WOUND CHECK: Primary | ICD-10-CM

## 2024-03-25 PROCEDURE — 1159F MED LIST DOCD IN RCRD: CPT | Mod: CPTII,S$GLB,, | Performed by: NURSE PRACTITIONER

## 2024-03-25 PROCEDURE — 99024 POSTOP FOLLOW-UP VISIT: CPT | Mod: S$GLB,,, | Performed by: NURSE PRACTITIONER

## 2024-03-25 PROCEDURE — 99999 PR PBB SHADOW E&M-EST. PATIENT-LVL III: CPT | Mod: PBBFAC,,, | Performed by: NURSE PRACTITIONER

## 2024-03-25 PROCEDURE — 1160F RVW MEDS BY RX/DR IN RCRD: CPT | Mod: CPTII,S$GLB,, | Performed by: NURSE PRACTITIONER

## 2024-03-25 PROCEDURE — 4010F ACE/ARB THERAPY RXD/TAKEN: CPT | Mod: CPTII,S$GLB,, | Performed by: NURSE PRACTITIONER

## 2024-03-25 NOTE — PROGRESS NOTES
Pt returns with c/o itching under his bandage and two small evacuated blisters that are visible through the clear dressing. The drainage from the blisters is trapped under the dressing. The dressing was removed and the area around the incision cleaned. Open blisters covered with xeroform and light mepilex dressings doubled to secure past the blister sites. He will f/u as scheduled or sooner as needed.

## 2024-03-26 ENCOUNTER — PATIENT MESSAGE (OUTPATIENT)
Dept: ORTHOPEDICS | Facility: CLINIC | Age: 54
End: 2024-03-26
Payer: COMMERCIAL

## 2024-03-26 ENCOUNTER — PATIENT MESSAGE (OUTPATIENT)
Dept: ADMINISTRATIVE | Facility: OTHER | Age: 54
End: 2024-03-26
Payer: COMMERCIAL

## 2024-03-26 LAB
FINAL PATHOLOGIC DIAGNOSIS: NORMAL
GROSS: NORMAL
Lab: NORMAL
MICROSCOPIC EXAM: NORMAL

## 2024-03-27 ENCOUNTER — TELEPHONE (OUTPATIENT)
Dept: ORTHOPEDICS | Facility: CLINIC | Age: 54
End: 2024-03-27
Payer: COMMERCIAL

## 2024-03-27 ENCOUNTER — TELEPHONE (OUTPATIENT)
Dept: PAIN MEDICINE | Facility: CLINIC | Age: 54
End: 2024-03-27
Payer: COMMERCIAL

## 2024-03-27 ENCOUNTER — PATIENT MESSAGE (OUTPATIENT)
Dept: ADMINISTRATIVE | Facility: OTHER | Age: 54
End: 2024-03-27
Payer: COMMERCIAL

## 2024-03-27 DIAGNOSIS — Z96.642 S/P HIP REPLACEMENT, LEFT: ICD-10-CM

## 2024-03-27 RX ORDER — CELECOXIB 200 MG/1
200 CAPSULE ORAL
Qty: 30 CAPSULE | Refills: 1 | Status: SHIPPED | OUTPATIENT
Start: 2024-03-27 | End: 2024-03-28 | Stop reason: SDUPTHER

## 2024-03-27 NOTE — TELEPHONE ENCOUNTER
Medication was refilled today by Dr. Ibarra      ----- Message from Yamini Cannon MA sent at 3/27/2024  4:17 PM CDT -----  Regarding: FW: PT'S RETURNING A CALL FROM [facility] REGARDING REFILL IF MEDS BELOW  Contact: PT    ----- Message -----  From: Cheryl Hogan  Sent: 3/27/2024   3:57 PM CDT  To: Sebastian Camacho Staff; Sharan Siddiqui Staff  Subject: PT'S RETURNING A CALL FROM [facility] REGARDING RE#    celecoxib (CELEBREX) 200 MG capsule    Pt received a call form a Catherine Reyes?? I was not able to locate team member     Confirmed contact info below:  Contact Name: Chris Nava  Phone Number: [phone]      [facility]/pharmacy #[id number] - [place]  [address]  [address]  [place] [address]  Phone: [phone] Fax: [phone]

## 2024-03-27 NOTE — TELEPHONE ENCOUNTER
Pt has been notified his medication refill request has been approved and sent to his pharmacy on file by .    ----- Message from Cheryl Hogan sent at 3/27/2024  3:52 PM CDT -----  Regarding: PT'S RETURNING A CALL FROM [facility] REGARDING REFILL IF MEDS BELOW  Contact: PT  celecoxib (CELEBREX) 200 MG capsule    Pt received a call form a Catherine Reyes?? I was not able to locate team member     Confirmed contact info below:  Contact Name: Chris Nava  Phone Number: [phone]      [facility]/pharmacy #[id number] - [place] - [address]  [address] [facility]  [place] [address]  Phone: [phone] Fax: [phone]             ,

## 2024-03-27 NOTE — TELEPHONE ENCOUNTER
----- Message from Cheryl Hogan sent at 3/27/2024  3:52 PM CDT -----  Regarding: PT'S RETURNING A CALL FROM [facility] REGARDING REFILL IF MEDS BELOW  Contact: PT  celecoxib (CELEBREX) 200 MG capsule    Pt received a call form rufus Reyes?? I was not able to locate team member     Confirmed contact info below:  Contact Name: Chris Nava  Phone Number: [phone]      [facility]/pharmacy #[id number] - [place] [address]  [address] [facility] [place]  [place] [address]  Phone: [phone] Fax: [phone]

## 2024-03-28 ENCOUNTER — OFFICE VISIT (OUTPATIENT)
Dept: ORTHOPEDICS | Facility: CLINIC | Age: 54
End: 2024-03-28
Payer: COMMERCIAL

## 2024-03-28 DIAGNOSIS — Z96.642 S/P HIP REPLACEMENT, LEFT: Primary | ICD-10-CM

## 2024-03-28 PROCEDURE — 1160F RVW MEDS BY RX/DR IN RCRD: CPT | Mod: CPTII,S$GLB,,

## 2024-03-28 PROCEDURE — 99024 POSTOP FOLLOW-UP VISIT: CPT | Mod: S$GLB,,,

## 2024-03-28 PROCEDURE — 4010F ACE/ARB THERAPY RXD/TAKEN: CPT | Mod: CPTII,S$GLB,,

## 2024-03-28 PROCEDURE — 99999 PR PBB SHADOW E&M-EST. PATIENT-LVL III: CPT | Mod: PBBFAC,,,

## 2024-03-28 PROCEDURE — 1159F MED LIST DOCD IN RCRD: CPT | Mod: CPTII,S$GLB,,

## 2024-03-28 RX ORDER — CELECOXIB 200 MG/1
200 CAPSULE ORAL DAILY
Qty: 23 CAPSULE | Refills: 0 | Status: SHIPPED | OUTPATIENT
Start: 2024-03-28

## 2024-03-29 NOTE — PROGRESS NOTES
Chris Marc Nava presents for an unscheduled post-operative visit following a left total hip arthroplasty performed by Dr. Barrios on 3/20/2024.  Patient called due to bandage peeling off following a bandage change by Janice Cortes on 3/25/24 secondary to a blister.    Exam:  There were no vitals taken for this visit.   Patient is ambulating well with cane   Incision is clean and dry without drainage or erythema. 2 ruptured healing blisters noted both medial and lateral to incision.    Initial post-operative radiographs reviewed today revealing satisfactory position of the prosthesis.    A/P  1 week s/p left total hip replacement    - Peeling bandage removed and replaced with aquacel. Patient instructed to continue to avoid getting the bandage wet until 2-week post-op visit next week.  - Follow up in 1 week with myself. Pt will call clinic immediately with problems/concerns.

## 2024-04-04 ENCOUNTER — OFFICE VISIT (OUTPATIENT)
Dept: ORTHOPEDICS | Facility: CLINIC | Age: 54
End: 2024-04-04
Payer: COMMERCIAL

## 2024-04-04 VITALS — BODY MASS INDEX: 46.65 KG/M2 | WEIGHT: 315 LBS | HEIGHT: 69 IN

## 2024-04-04 DIAGNOSIS — Z96.642 S/P HIP REPLACEMENT, LEFT: Primary | ICD-10-CM

## 2024-04-04 PROCEDURE — 99024 POSTOP FOLLOW-UP VISIT: CPT | Mod: S$GLB,,,

## 2024-04-04 PROCEDURE — 1159F MED LIST DOCD IN RCRD: CPT | Mod: CPTII,S$GLB,,

## 2024-04-04 PROCEDURE — 4010F ACE/ARB THERAPY RXD/TAKEN: CPT | Mod: CPTII,S$GLB,,

## 2024-04-04 PROCEDURE — 99999 PR PBB SHADOW E&M-EST. PATIENT-LVL III: CPT | Mod: PBBFAC,,,

## 2024-04-04 PROCEDURE — 1160F RVW MEDS BY RX/DR IN RCRD: CPT | Mod: CPTII,S$GLB,,

## 2024-04-04 NOTE — PROGRESS NOTES
Chris Marc Nava presents for initial post-operative visit following a left total hip arthroplasty performed by Dr. Barrios on 3/20/2024.     Exam:  There were no vitals taken for this visit.   Patient is ambulating well with pain   Incision is clean and dry without drainage or erythema.      Initial post-operative radiographs reviewed today revealing satisfactory position of the prosthesis.    A/P  2 weeks s/p left total hip replacement    - Patient advised to keep the incision clean and dry for the next 24 hours after which he  may wash the area with antibacterial soap in the shower, but will not submerge incision until one month post op.  - Home health PT   - Continue posterior hip precautions for 6 weeks.  - Antibiotic prophylaxis reviewed  - Continue aspirin for 1 month post op  - Pain medication:  No refill needed  - Follow up in 4 weeks with surgeon. Pt will call clinic immediately with problems/concerns.

## 2024-04-18 ENCOUNTER — PATIENT MESSAGE (OUTPATIENT)
Dept: ADMINISTRATIVE | Facility: OTHER | Age: 54
End: 2024-04-18
Payer: COMMERCIAL

## 2024-04-19 ENCOUNTER — PATIENT MESSAGE (OUTPATIENT)
Dept: ADMINISTRATIVE | Facility: OTHER | Age: 54
End: 2024-04-19
Payer: COMMERCIAL

## 2024-04-24 ENCOUNTER — CLINICAL SUPPORT (OUTPATIENT)
Dept: ENDOSCOPY | Facility: HOSPITAL | Age: 54
End: 2024-04-24
Payer: COMMERCIAL

## 2024-04-24 DIAGNOSIS — Z12.11 SPECIAL SCREENING FOR MALIGNANT NEOPLASMS, COLON: ICD-10-CM

## 2024-04-24 NOTE — PLAN OF CARE
Spoke to pt. To schedule. Offered pt. 7/3/24. Pt. Could not come that day . New refendo created for pt. My O msg. Sent. Pt. To call back next Friday.

## 2024-05-01 ENCOUNTER — PATIENT MESSAGE (OUTPATIENT)
Dept: PAIN MEDICINE | Facility: CLINIC | Age: 54
End: 2024-05-01
Payer: COMMERCIAL

## 2024-05-07 ENCOUNTER — OFFICE VISIT (OUTPATIENT)
Dept: ORTHOPEDICS | Facility: CLINIC | Age: 54
End: 2024-05-07
Payer: COMMERCIAL

## 2024-05-07 ENCOUNTER — HOSPITAL ENCOUNTER (OUTPATIENT)
Dept: RADIOLOGY | Facility: HOSPITAL | Age: 54
Discharge: HOME | End: 2024-05-07
Attending: ORTHOPAEDIC SURGERY
Payer: COMMERCIAL

## 2024-05-07 VITALS — BODY MASS INDEX: 46.65 KG/M2 | WEIGHT: 315 LBS | HEIGHT: 69 IN

## 2024-05-07 DIAGNOSIS — Z96.642 S/P HIP REPLACEMENT, LEFT: Primary | ICD-10-CM

## 2024-05-07 DIAGNOSIS — Z96.642 S/P HIP REPLACEMENT, LEFT: ICD-10-CM

## 2024-05-07 PROCEDURE — 4010F ACE/ARB THERAPY RXD/TAKEN: CPT | Mod: CPTII,S$GLB,, | Performed by: ORTHOPAEDIC SURGERY

## 2024-05-07 PROCEDURE — 1159F MED LIST DOCD IN RCRD: CPT | Mod: CPTII,S$GLB,, | Performed by: ORTHOPAEDIC SURGERY

## 2024-05-07 PROCEDURE — 73502 X-RAY EXAM HIP UNI 2-3 VIEWS: CPT | Mod: 26,LT,, | Performed by: RADIOLOGY

## 2024-05-07 PROCEDURE — 73502 X-RAY EXAM HIP UNI 2-3 VIEWS: CPT | Mod: TC,LT

## 2024-05-07 PROCEDURE — 99024 POSTOP FOLLOW-UP VISIT: CPT | Mod: S$GLB,,, | Performed by: ORTHOPAEDIC SURGERY

## 2024-05-07 PROCEDURE — 99999 PR PBB SHADOW E&M-EST. PATIENT-LVL III: CPT | Mod: PBBFAC,,, | Performed by: ORTHOPAEDIC SURGERY

## 2024-05-07 NOTE — PROGRESS NOTES
Subjective:     HPI:   Chris Nava is a 53 y.o. male who presents 6 weeks out from left KAYLA     Date of surgery: L post DM KAYLA, 3/20/24, fem head path neg    History of Present Illness  The patient presents for evaluation status post left hip surgery on 03/20/2024.    The patient reports no current pain and is not currently utilizing any pain medications such as Advil, Aleve, or Tylenol. He now sleeps on his back and upon awakening, he rolls onto his left hip and subsequently rises, resulting in mild soreness. He utilizes a cane for stability, particularly towards the end of the day, due to feelings of instability, although he does not utilize it at home. He has completed home therapy and has resumed full-time work in the second week post-surgery. He intends to initiate aqua therapy. He conducts daily incision checks and believes his leg lengths are equal.    Medications: none off everything    Assistive Devices: cane long distances end of day fatigued, not in the house    PT: finished    Limitations: none    Doing great, no pain    Back at work full time week 2        Objective:   Body mass index is 46.81 kg/m².  Exam:    Gait: non antalgic/ mild/mod trendelenburg    Incision: healed    Active straight leg raise: good strength, no discomfort    Extension: 0    Flexion: 90    Abduction: 40    Adduction: 30    External rotation: 45    Internal rotation: 30    LLD: 0 cm supine     Physical Exam  The patient is walking well, nonantalgic, with a slight Trendelenburg. The incision on the hip is well healed. Hip range of motion is 0 to 90 degrees. Leg lengths appear to be equal.      Imaging:  Indication:  Exam status post left total hip arthroplasty  Exam Ordered: Radiographs taken today include an anteroposterior pelvis and cross table lateral view of the proximal femur including the hip joint  Details of Examination: Today's exam show a well fixed, well positioned total hip arthroplasty with no evidence of  wear, osteolysis, or loosening.  Impression:  Status post left total hip arthroplasty, implant in good position with no abnormality    Results  Laboratory Studies  Pathology of the femoral head was negative.    Imaging  Right hip x-ray shows satisfactory results. Left hip x-ray shows a titanium socket, a new ceramic and polyethylene ball, and a dual mobility construct. Stem fitting and filling nicely. Leg length and offset are matching up very nicely.      Assessment:       ICD-10-CM ICD-9-CM   1. S/P hip replacement, left  Z96.642 V43.64      Doing well     Plan:       Patient is doing very well with the hip replacement at this time.  They will continue routine care of their hip and see me for their routine follow-up.  If there are problems in the interim they will see me back sooner.     Phase 2 hip exercises  Focus on abductors      Assessment & Plan  1. Post-operative status following left hip surgery.  The patient is advised to engage in gentle exercises such as standing at the kitchen counter and exiting to the side, which will aid in strengthening the lateral aspect of his hip. He is permitted to perform all desired activities, albeit with caution to avoid falls. However, overexertion may lead to irritation of the muscles, tendon, soft tissues, and nerves around the hip. He is advised to listen to his body pace and to avoid extreme motion in one direction. A regimen of stretching and strengthening exercises will be provided, focusing on hip range of motion and strength, to be performed once or twice daily. He is cautioned against any surgical or dental procedures.    6 week follow up    This note was generated with the assistance of ambient listening technology. Verbal consent was obtained by the patient and accompanying visitor(s) for the recording of patient appointment to facilitate this note. I attest to having reviewed and edited the generated note for accuracy, though some syntax or spelling errors may  persist. Please contact the author of this note for any clarification.      No orders of the defined types were placed in this encounter.            Past Medical History:   Diagnosis Date    Abscess of finger of right hand 01/18/2024    Arthritis     Cellulitis of finger of right hand 01/17/2024    Hypertension     Kidney stones     Personal history of kidney stones 10/11/2023       Past Surgical History:   Procedure Laterality Date    ARTHROPLASTY OF HIP BY POSTERIOR APPROACH Left 3/20/2024    Procedure: ARTHROPLASTY, HIP, TOTAL, POSTERIOR APPROACH: LEFT: DEPUY - ACTIS + PINNACLE;  Surgeon: Scot Barrios III, MD;  [facility];  Service: Orthopedics;  Laterality: Left;    CYSTOSCOPY W/ LASER LITHOTRIPSY      JUSTIN surgery      removed uvula, tonsils and adenoids for JUSTIN    THYROID CYST EXCISION      2017    VASECTOMY         Family History   Problem Relation Name Age of Onset    Arthritis Father Ralph Nava     Cancer Father Ralph Nava     Hearing loss Father Ralph Nava     Hypertension Father Ralph Sosahelot     Hypertension Brother Burke Nava     Hypertension Brother Augusto Nava     Asthma Daughter Imani Nava     Hearing loss Maternal Uncle De Interiano     Cancer Maternal Grandmother Joanna Interiano     Arthritis Paternal Grandmother Ophelia Sosahelot     Stroke Paternal Grandmother Ophelia Nava     Cancer Paternal Grandfather Eugenio Nava        Social History     Socioeconomic History    Marital status:     Number of children: 2   Occupational History    Occupation:    Tobacco Use    Smoking status: Never     Passive exposure: Never    Smokeless tobacco: Never   Substance and Sexual Activity    Alcohol use: Yes     Comment: Maybe wine at dinner 1x per week, maybe bourbon 1 night p/wk    Drug use: Yes     Frequency: 7.0 times per week     Types: Marijuana     Comment: Sleep aid only for arthritic purposes. Use for 2 months only    Sexual activity:  Yes     Partners: Female     Birth control/protection: Post-menopausal     Comment: I have a vasectomy, she is post menopausal   Social History Narrative    12     Social Determinants of Health     Financial Resource Strain: Low Risk  (1/29/2024)    Overall Financial Resource Strain (CARDIA)     Difficulty of Paying Living Expenses: Not very hard   Food Insecurity: No Food Insecurity (1/29/2024)    Hunger Vital Sign     Worried About Running Out of Food in the Last Year: Never true     Ran Out of Food in the Last Year: Never true   Transportation Needs: No Transportation Needs (1/29/2024)    PRAPARE - Transportation     Lack of Transportation (Medical): No     Lack of Transportation (Non-Medical): No   Physical Activity: Inactive (1/29/2024)    Exercise Vital Sign     Days of Exercise per Week: 0 days     Minutes of Exercise per Session: 0 min   Stress: No Stress Concern Present (1/29/2024)    [employer] [employer] - Occupational Stress Questionnaire     Feeling of Stress : Not at all   Recent Concern: Stress - Stress Concern Present (1/18/2024)    [place] [employer] - Occupational Stress Questionnaire     Feeling of Stress : To some extent   Housing Stability: Low Risk  (1/29/2024)    Housing Stability Vital Sign     Unable to Pay for Housing in the Last Year: No     Number of Places Lived in the Last Year: 2     Unstable Housing in the Last Year: No

## 2024-05-10 ENCOUNTER — EXTERNAL HOME HEALTH (OUTPATIENT)
Dept: HOME HEALTH SERVICES | Facility: HOSPITAL | Age: 54
End: 2024-05-10
Payer: COMMERCIAL

## 2024-05-24 ENCOUNTER — PATIENT MESSAGE (OUTPATIENT)
Dept: ADMINISTRATIVE | Facility: OTHER | Age: 54
End: 2024-05-24
Payer: COMMERCIAL

## 2024-05-30 ENCOUNTER — TELEPHONE (OUTPATIENT)
Dept: PHARMACY | Facility: CLINIC | Age: 54
End: 2024-05-30
Payer: COMMERCIAL

## 2024-06-04 ENCOUNTER — PATIENT MESSAGE (OUTPATIENT)
Dept: ADMINISTRATIVE | Facility: OTHER | Age: 54
End: 2024-06-04
Payer: COMMERCIAL

## 2024-06-07 ENCOUNTER — PATIENT OUTREACH (OUTPATIENT)
Dept: ADMINISTRATIVE | Facility: HOSPITAL | Age: 54
End: 2024-06-07
Payer: COMMERCIAL

## 2024-06-13 ENCOUNTER — OFFICE VISIT (OUTPATIENT)
Dept: ORTHOPEDICS | Facility: CLINIC | Age: 54
End: 2024-06-13
Payer: COMMERCIAL

## 2024-06-13 VITALS — WEIGHT: 315 LBS | BODY MASS INDEX: 46.65 KG/M2 | HEIGHT: 69 IN

## 2024-06-13 DIAGNOSIS — Z96.642 S/P HIP REPLACEMENT, LEFT: Primary | ICD-10-CM

## 2024-06-13 PROCEDURE — 99999 PR PBB SHADOW E&M-EST. PATIENT-LVL III: CPT | Mod: PBBFAC,,, | Performed by: ORTHOPAEDIC SURGERY

## 2024-06-13 PROCEDURE — 1159F MED LIST DOCD IN RCRD: CPT | Mod: CPTII,S$GLB,, | Performed by: ORTHOPAEDIC SURGERY

## 2024-06-13 PROCEDURE — 4010F ACE/ARB THERAPY RXD/TAKEN: CPT | Mod: CPTII,S$GLB,, | Performed by: ORTHOPAEDIC SURGERY

## 2024-06-13 PROCEDURE — 99024 POSTOP FOLLOW-UP VISIT: CPT | Mod: S$GLB,,, | Performed by: ORTHOPAEDIC SURGERY

## 2024-06-13 NOTE — PROGRESS NOTES
Subjective:     HPI:   Chris Nava is a 53 y.o. male who presents 12 weeks out from left KAYLA     Date of surgery: L post DM KAYLA, 3/20/24, fem head path neg    History of Present Illness  The patient presents for evaluation of his left hip.    The patient, now three months post-surgery, reports significant improvement in his left hip condition, with near-complete resolution of sensation. He is now able to cross his legs without resorting to his hands to elevate his foot. He expresses a need for extensive stretching exercises and queries the possibility of attending a stretch lab or stretch zone. He has abstained from pain medication and is not using a cane for mobility. His condition does not impede his daily activities. His daughter, an x-ray technician, provided him with an x-ray picture of his hip's dislocation post-surgery. His only complaint is minor knee issues. His incision has healed. He has yet to commence weight lifting exercises, specifically squats, but incorporates light weights into his routine in the pool. He also inquires about the possibility of transitioning to low seating and low chairs.    Medications: none    Assistive Devices: off cane completely     Limitations: none, working on stretching/ROM - dont go to stretch lab    Incredible, couldn't feel better     Did have a fall onto right side in garden, no L hip issues    Now is system level  for [facility]     Objective:   Body mass index is 46.82 kg/m².  Exam:    Gait: limp/antalgic/trendelenburg none TB resolved    Incision: healed    Active straight leg raise: good strength, no discomfort    Extension: 0    Flexion: 120    Abduction: 50    Adduction: 30    External rotation: 50    Internal rotation: 20    LLD: 0 cm supine     Physical Exam      Imaging:  None today    Results        Assessment:       ICD-10-CM ICD-9-CM   1. S/P hip replacement, left  Z96.642 V43.64      Doing well     Plan:       Patient is doing very well with the hip  replacement at this time.  They will continue routine care of their hip and see me for their routine follow-up.  If there are problems in the interim they will see me back sooner. Prophylactic antibiotic protocol given and explained to patient.     Has been doing a lot of swimming - great    Assessment & Plan  1. Postoperative status of the left hip.  The patient's mobility has improved, and the limp has resolved. The patient is advised to engage in low-impact aerobic exercises such as swimming, cycling, using an elliptical machine, and walking on flat-level ground. He is also advised to avoid deep squats.    9 month follow up for annual xray    This note was generated with the assistance of ambient listening technology. Verbal consent was obtained by the patient and accompanying visitor(s) for the recording of patient appointment to facilitate this note. I attest to having reviewed and edited the generated note for accuracy, though some syntax or spelling errors may persist. Please contact the author of this note for any clarification.      No orders of the defined types were placed in this encounter.            Past Medical History:   Diagnosis Date    Abscess of finger of right hand 01/18/2024    Arthritis     Cellulitis of finger of right hand 01/17/2024    Hypertension     Kidney stones     Personal history of kidney stones 10/11/2023       Past Surgical History:   Procedure Laterality Date    ARTHROPLASTY OF HIP BY POSTERIOR APPROACH Left 3/20/2024    Procedure: ARTHROPLASTY, HIP, TOTAL, POSTERIOR APPROACH: LEFT: DEPUY - ACTIS + PINNACLE;  Surgeon: Scot Barrios III, MD;  Location: [facility];  Service: Orthopedics;  Laterality: Left;    CYSTOSCOPY W/ LASER LITHOTRIPSY      JUSTIN surgery      removed uvula, tonsils and adenoids for JUSTIN    THYROID CYST EXCISION      2017    VASECTOMY         Family History   Problem Relation Name Age of Onset    Arthritis Father Ralph Nava     Cancer Father Ralph JACKSON  Elijah     Hearing loss Father Ralph Nava     Hypertension Father Ralph Nava     Hypertension Brother Burke Nava     Hypertension Brother Augusto Nava     Asthma Daughter Imani Nava     Hearing loss Maternal Uncle De Interiano     Cancer Maternal Grandmother Joanna Interiano     Arthritis Paternal Grandmother Ophelia Nava     Stroke Paternal Grandmother Ophelia Nava     Cancer Paternal Grandfather Eugenio Nava        Social History     Socioeconomic History    Marital status:     Number of children: 2   Occupational History    Occupation:    Tobacco Use    Smoking status: Never     Passive exposure: Never    Smokeless tobacco: Never   Substance and Sexual Activity    Alcohol use: Yes     Comment: Maybe wine at dinner 1x per week, maybe bourbon 1 night p/wk    Drug use: Yes     Frequency: 7.0 times per week     Types: Marijuana     Comment: Sleep aid only for arthritic purposes. Use for 2 months only    Sexual activity: Yes     Partners: Female     Birth control/protection: Post-menopausal     Comment: I have a vasectomy, she is post menopausal   Social History Narrative    14     Social Determinants of Health     Financial Resource Strain: Low Risk  (1/29/2024)    Overall Financial Resource Strain (CARDIA)     Difficulty of Paying Living Expenses: Not very hard   Food Insecurity: No Food Insecurity (1/29/2024)    Hunger Vital Sign     Worried About Running Out of Food in the Last Year: Never true     Ran Out of Food in the Last Year: Never true   Transportation Needs: No Transportation Needs (1/29/2024)    PRAPARE - Transportation     Lack of Transportation (Medical): No     Lack of Transportation (Non-Medical): No   Physical Activity: Inactive (1/29/2024)    Exercise Vital Sign     Days of Exercise per Week: 0 days     Minutes of Exercise per Session: 0 min   Stress: No Stress Concern Present (1/29/2024)    [employer] [employer] - Occupational  Stress Questionnaire     Feeling of Stress : Not at all   Recent Concern: Stress - Stress Concern Present (1/18/2024)    [employer] [place] [employer] - Occupational Stress Questionnaire     Feeling of Stress : To some extent   Housing Stability: Low Risk  (1/29/2024)    Housing Stability Vital Sign     Unable to Pay for Housing in the Last Year: No     Number of Places Lived in the Last Year: 2     Unstable Housing in the Last Year: No

## 2024-06-27 ENCOUNTER — PATIENT MESSAGE (OUTPATIENT)
Dept: ADMINISTRATIVE | Facility: OTHER | Age: 54
End: 2024-06-27
Payer: COMMERCIAL

## 2024-07-29 ENCOUNTER — PATIENT MESSAGE (OUTPATIENT)
Dept: PAIN MEDICINE | Facility: CLINIC | Age: 54
End: 2024-07-29
Payer: COMMERCIAL

## 2024-07-29 ENCOUNTER — PATIENT MESSAGE (OUTPATIENT)
Dept: ORTHOPEDICS | Facility: CLINIC | Age: 54
End: 2024-07-29
Payer: COMMERCIAL

## 2024-08-22 ENCOUNTER — OFFICE VISIT (OUTPATIENT)
Dept: SPINE | Facility: CLINIC | Age: 54
End: 2024-08-22
Payer: COMMERCIAL

## 2024-08-22 VITALS
WEIGHT: 315 LBS | DIASTOLIC BLOOD PRESSURE: 66 MMHG | BODY MASS INDEX: 46.65 KG/M2 | SYSTOLIC BLOOD PRESSURE: 130 MMHG | HEART RATE: 82 BPM | HEIGHT: 69 IN | RESPIRATION RATE: 18 BRPM

## 2024-08-22 DIAGNOSIS — E66.01 MORBID OBESITY WITH BMI OF 45.0-49.9, ADULT: ICD-10-CM

## 2024-08-22 DIAGNOSIS — M48.062 SPINAL STENOSIS OF LUMBAR REGION WITH NEUROGENIC CLAUDICATION: Primary | ICD-10-CM

## 2024-08-22 PROCEDURE — 3075F SYST BP GE 130 - 139MM HG: CPT | Mod: CPTII,S$GLB,, | Performed by: ANESTHESIOLOGY

## 2024-08-22 PROCEDURE — 99214 OFFICE O/P EST MOD 30 MIN: CPT | Mod: S$GLB,,, | Performed by: ANESTHESIOLOGY

## 2024-08-22 PROCEDURE — 3078F DIAST BP <80 MM HG: CPT | Mod: CPTII,S$GLB,, | Performed by: ANESTHESIOLOGY

## 2024-08-22 PROCEDURE — 99999 PR PBB SHADOW E&M-EST. PATIENT-LVL III: CPT | Mod: PBBFAC,,, | Performed by: ANESTHESIOLOGY

## 2024-08-22 PROCEDURE — 4010F ACE/ARB THERAPY RXD/TAKEN: CPT | Mod: CPTII,S$GLB,, | Performed by: ANESTHESIOLOGY

## 2024-08-22 PROCEDURE — 3008F BODY MASS INDEX DOCD: CPT | Mod: CPTII,S$GLB,, | Performed by: ANESTHESIOLOGY

## 2024-08-22 PROCEDURE — 1159F MED LIST DOCD IN RCRD: CPT | Mod: CPTII,S$GLB,, | Performed by: ANESTHESIOLOGY

## 2024-08-22 NOTE — H&P (VIEW-ONLY)
PCP: No, Primary Doctor    REFERRING PHYSICIAN: No ref. provider found    CHIEF COMPLAINT: Left Hip Pain    Original HISTORY OF PRESENT ILLNESS: Chris Nava presents to the clinic for the evaluation of the above pain. The pain started approximately 6 years ago after no particular event.     Original Pain Description:  The pain is located in the left groin. The pain is described as aching and stabbing. Exacerbating factors: Walking. Mitigating factors sitting. Symptoms interfere with daily activity. The patient feels like symptoms have been worsening. Patient denies night fever/night sweats, urinary incontinence, bowel incontinence, and significant motor weakness.    Original PAIN SCORES:  Best: Pain is 1  Worst: Pain is 10  Current: Pain is 1        1/31/2024     1:36 PM   Last 3 PDI Scores   Pain Disability Index (PDI) 50       INTERVAL HISTORY: (Newest visit at the bottom)     Interval History 11/1/23: Chris Nava is here for follow up. At last visit we planned to trial Celebrex and discuss with orthopaedics about left hip steroid injection. He states that he has not noticed significant difference since starting Celebrex. He states that while at work he experiences bilateral leg numbness within 20 minutes of standing and worries about falling due to loss of feeling. This numbness is relieved with leaning forward. He states he had an MRI ~5 years ago, and was told that he had bulging disc and degenerative disease. Denies fevers, night sweats, unintended weight loss, loss of bowel or bladder incontinence.     Interval History 1/31/2024: Chris Nava is here for follow-up of his left hip pain. He says the last 3 weeks the pain has been the worst it's been and he's now using a cane for ambulation. The pain is affecting his work. He saw Dr. Barrios (Ortho) and said he needs a KAYLA, but his weight would need to be less than 300lbs for him to operate. Per Dr. Barrios, if he got a steroid  "injection in his hip he wouldn't be able to operate for 6 months. He has been focusing on his diet for weight loss as his physical mobility is severely limited. He still intermittently gets numbness in both his legs while walking. Today, he walked from the parking garage to clinic and his legs did not go numb, but sometimes they will go numb when he's just walking to the bathroom at home. The numbness resolves when he sits and leans forward, but doesn't resolve when he leans forward while standing. He thinks the Celebrex is helping his pain -- he ran out a couple months ago and didn't have it for a week and his pain was significantly worse. He's also taking Cymbalta. He said he doesn't want opioids because they just make him "loopy," but they don't take away the pain. Medical marijuana helps him sleep through the night.      Interval History 8/22/24: Chris Nava returns for follow up. At our last visit we trialed a short dose of opioids to assist with pain prior to KAYLA. He returns today having undergone KAYLA with Dr. Barrios on 3/20/24. He presents today because he has been having trouble walking. He walks 30 yards and he has to sit down and lean forward because his legs go numb. We reviewed his MRI, which does show moderate canal stenosis.     6 weeks of Conservative therapy:  PT: Sept-Oct, 2023.   Chiro:  HEP:     Treatments / Medications: (Ice/Heat/NSAIDS/APAP/etc):  Ice  Heat  APAP  Cymbalta - helps  Celebrex  Marijuana QHS- helps    Interventional Pain Procedures:   May 2023 left hip iaCSi in [place], significant relief for 8hrs       Past Medical History:   Diagnosis Date    Abscess of finger of right hand 01/18/2024    Arthritis     Cellulitis of finger of right hand 01/17/2024    Hypertension     Kidney stones     Personal history of kidney stones 10/11/2023     Past Surgical History:   Procedure Laterality Date    ARTHROPLASTY OF HIP BY POSTERIOR APPROACH Left 3/20/2024    Procedure: ARTHROPLASTY, " HIP, TOTAL, POSTERIOR APPROACH: LEFT: DEPUY - ACTIS + PINNACLE;  Surgeon: Scot Barrios III, MD;  Location: [facility];  Service: Orthopedics;  Laterality: Left;    CYSTOSCOPY W/ LASER LITHOTRIPSY      JUSTIN surgery      removed uvula, tonsils and adenoids for JUSTIN    THYROID CYST EXCISION      2017    VASECTOMY       Social History     Socioeconomic History    Marital status:     Number of children: 2   Occupational History    Occupation:    Tobacco Use    Smoking status: Never     Passive exposure: Never    Smokeless tobacco: Never   Substance and Sexual Activity    Alcohol use: Yes     Comment: Maybe wine at dinner 1x per week, maybe bourbon 1 night p/wk    Drug use: Yes     Frequency: 7.0 times per week     Types: Marijuana     Comment: Sleep aid only for arthritic purposes. Use for 2 months only    Sexual activity: Yes     Partners: Female     Birth control/protection: Post-menopausal     Comment: I have a vasectomy, she is post menopausal   Social History Narrative    14     Social Determinants of Health     Financial Resource Strain: Low Risk  (1/29/2024)    Overall Financial Resource Strain (CARDIA)     Difficulty of Paying Living Expenses: Not very hard   Food Insecurity: No Food Insecurity (1/29/2024)    Hunger Vital Sign     Worried About Running Out of Food in the Last Year: Never true     Ran Out of Food in the Last Year: Never true   Transportation Needs: No Transportation Needs (1/29/2024)    PRAPARE - Transportation     Lack of Transportation (Medical): No     Lack of Transportation (Non-Medical): No   Physical Activity: Inactive (1/29/2024)    Exercise Vital Sign     Days of Exercise per Week: 0 days     Minutes of Exercise per Session: 0 min   Stress: No Stress Concern Present (1/29/2024)    [employer] [employer] - Occupational Stress Questionnaire     Feeling of Stress : Not at all   Recent Concern: Stress - Stress Concern Present (1/18/2024)    [place] [employer]  [employer]upational [employer] - Occupational Stress Questionnaire     Feeling of Stress : To some extent   Housing Stability: Low Risk  (1/29/2024)    Housing Stability Vital Sign     Unable to Pay for Housing in the Last Year: No     Number of Places Lived in the Last Year: 2     Unstable Housing in the Last Year: No     Family History   Problem Relation Name Age of Onset    Arthritis Father Ralph Nava     Cancer Father Ralph Nava     Hearing loss Father Ralph Nava     Hypertension Father Ralph Nava     Hypertension Brother Burke Nvaa     Hypertension Brother Augusto Nava     Asthma Daughter Imani Nava     Hearing loss Maternal Uncle De Interiano     Cancer Maternal Grandmother Joanna Interiano     Arthritis Paternal Grandmother Ophelia Nava     Stroke Paternal Grandmother Ophelia Nava     Cancer Paternal Grandfather Eugenio Nava        Review of patient's allergies indicates:  No Known Allergies    Current Outpatient Medications   Medication Sig    allopurinoL (ZYLOPRIM) 100 MG tablet Take 200 mg by mouth nightly.    DULoxetine (CYMBALTA) 20 MG capsule Take 1 capsule (20 mg total) by mouth once daily.    INV allopurinol tablet 2 tablet BEDTIME (route: oral)    lisinopriL (PRINIVIL,ZESTRIL) 20 MG tablet Take 20 mg by mouth nightly.    acetaminophen (TYLENOL) 650 MG TbSR Take 1 tablet (650 mg total) by mouth every 8 (eight) hours.    aspirin (ECOTRIN) 81 MG EC tablet Take 1 tablet (81 mg total) by mouth 2 (two) times a day.    cefadroxil (DURICEF) 500 MG Cap Take 1 capsule (500 mg total) by mouth every 12 (twelve) hours. (Patient not taking: Reported on 5/7/2024)    celecoxib (CELEBREX) 200 MG capsule Take 1 capsule (200 mg total) by mouth once daily.    DULoxetine (CYMBALTA) 60 MG capsule Take 1 capsule (60 mg total) by mouth once daily. (Patient taking differently: Take 60 mg by mouth nightly.)    metFORMIN (GLUCOPHAGE-XR) 500 MG ER 24hr tablet Take 500 mg by mouth every  "morning.    multivit-min/ferrous fumarate (MULTI VITAMIN ORAL) Take 1 tablet by mouth once daily.    oxyCODONE (ROXICODONE) 5 MG immediate release tablet Take 1 tablet every 4-6 hours as needed for pain (Patient not taking: Reported on 5/7/2024)    pantoprazole (PROTONIX) 40 MG tablet Take 1 tablet (40 mg total) by mouth once daily.    potassium citrate (UROCIT-K 15) 15 mEq TbSR Take 1 tablet by mouth nightly. (Patient not taking: Reported on 8/22/2024)    senna-docusate 8.6-50 mg (SENNA WITH DOCUSATE SODIUM) 8.6-50 mg per tablet Take 1 tablet by mouth once daily. (Patient not taking: Reported on 5/7/2024)     No current facility-administered medications for this visit.       ROS:  GENERAL: No fever. No chills. No fatigue. Denies weight loss. Denies weight gain.  HEENT: Denies headaches. Denies vision change. Denies eye pain. Denies double vision. Denies ear pain.   CV: Denies chest pain.   PULM: Denies of shortness of breath.  GI: Denies constipation. No diarrhea. No abdominal pain. Denies nausea. Denies vomiting. No blood in stool.  HEME: Denies bleeding problems.  : Denies urgency. No painful urination. No blood in urine.  MS: See HPI.  SKIN: Denies rash.   NEURO: Denies seizures. No weakness.  PSYCH:  Denies difficulty sleeping. No anxiety. Denies depression. No suicidal thoughts.       VITALS:   Vitals:    08/22/24 1315   BP: 130/66   Pulse: 82   Resp: 18   Weight: (!) 143.8 kg (317 lb 0.3 oz)   Height: 5' 9" (1.753 m)   PainSc: 10-Worst pain ever   PainLoc: Leg   PHYSICAL EXAM:   GENERAL: Well appearing, in no acute distress, alert and oriented x3.  PSYCH:  Mood and affect appropriate.  SKIN: Skin color, texture, turgor normal, no rashes or lesions.  HEENT:  Normocephalic, atraumatic. Cranial nerves grossly intact.  NECK: No pain to palpation over the cervical paraspinous muscles. No pain to palpation over facets. No pain with neck flexion, extension, or lateral flexion.   PULM: No evidence of respiratory " difficulty, symmetric chest rise.  GI:  Non-distended  BACK: Normal range of motion. No pain to palpation over the spinous processes. No pain to palpation over facet joints. There is no pain with palpation over the sacroiliac joints bilaterally. Straight leg raising in the supine position is negative to radicular pain.   EXTREMITIES: No deformities, edema, or skin discoloration.   MUSCULOSKELETAL: Shoulder, hip, and knee provocative maneuvers are negative. Bilateral upper and lower extremity strength is normal and symmetric. No atrophy is noted.  NEURO: Sensation is equal and appropriate bilaterally. Bilateral upper and lower extremity coordination and muscle stretch reflexes are physiologic and symmetric. Plantar response are downgoing.   GAIT: normal.    LABS:  Lab Results   Component Value Date    WBC 8.21 2024    HGB 16.4 2024    HCT 48 2024    MCV 85 2024     2024     Lab Results   Component Value Date    CREATININE 0.8 2024    BUN 20 2024     2024    K 4.0 2024     2024    CO2 25 2024       IMAGIN/22/24 EXAMINATION: MRI LUMBAR SPINE WITHOUT CONTRAST     CLINICAL HISTORY: Low back pain, progressive neurologic deficit; Dorsalgia, unspecified  TECHNIQUE: Multiplanar, multisequence MR images were acquired from the thoracolumbar junction to the sacrum without contrast.  COMPARISON: None.     FINDINGS:  Alignment: Minimal retrolisthesis of L1-L2.     Vertebrae: Diffuse low T1 signal throughout the visualized axial skeleton.     Discs: Mild-to-moderate disc height loss throughout the lumbar spine.     Cord: Conus terminates at T12-L1 and appears unremarkable.  Cauda equina appears unremarkable.     Degenerative findings:     T12-L1: No spinal canal stenosis or neuroforaminal narrowing.  L1-L2: Circumferential disc bulge and mild facet arthropathy result in mild spinal canal stenosis and moderate left, mild right neural  foraminal narrowing.  L2-L3: Circumferential disc bulge and mild facet arthropathy result in mild effacement of the thecal sac and moderate bilateral neural foraminal narrowing.  L3-L4: Circumferential disc bulge with central protrusion and severe facet arthropathy result in moderate spinal canal stenosis and moderate bilateral neural foraminal narrowing.  L4-L5: Circumferential disc bulge and severe facet arthropathy result in mild bilateral neural foraminal narrowing.  L5-S1: Moderate facet arthropathy results in mild right neural foraminal narrowing.     Paraspinal muscles & soft tissues: Moderate paraspinal muscle atrophy.     Impression:  1. Diffusely abnormal bone marrow signal, which may be seen with red marrow hyperplasia or marrow infiltrative process.  Clinical evaluation advised.  2. Multilevel degenerative changes detailed above.  Moderate spinal canal stenosis noted at L3-L4.  Moderate neural foraminal narrowing noted at L1-L4.        ASSESSMENT: 54 y.o. year old male with pain, consistent with:    Encounter Diagnoses   Name Primary?    Spinal stenosis of lumbar region with neurogenic claudication Yes    Morbid obesity with BMI of 45.0-49.9, adult        DISCUSSION: Chris Nava is a  for [employer] who comes to us with left hip pain which is worst with standing and walking. He underwent KAYLA with Dr. Barrios with complete pain relief. He returned with limited walking duration causing low back pain and leg numbness requiring him to sit down and bend over. LMRI shows moderate canal stenosis at L3/4.     PLAN:  Reviewed MRI with Mr. Nava  Discussed treatment options  Recommend sleeping on side not back  Referred back to PT  Schedule B/L L4/5 TFESI  Discussed weight loss. He will get back in the weight loss mindset. He previously lost 100 lbs with willpower and dieting.   Follow up after PT to discuss options       Sofy Ibarra  08/22/2024

## 2024-08-22 NOTE — PROGRESS NOTES
PCP: No, Primary Doctor    REFERRING PHYSICIAN: No ref. provider found    CHIEF COMPLAINT: Left Hip Pain    Original HISTORY OF PRESENT ILLNESS: Chris Nava presents to the clinic for the evaluation of the above pain. The pain started approximately 6 years ago after no particular event.     Original Pain Description:  The pain is located in the left groin. The pain is described as aching and stabbing. Exacerbating factors: Walking. Mitigating factors sitting. Symptoms interfere with daily activity. The patient feels like symptoms have been worsening. Patient denies night fever/night sweats, urinary incontinence, bowel incontinence, and significant motor weakness.    Original PAIN SCORES:  Best: Pain is 1  Worst: Pain is 10  Current: Pain is 1        1/31/2024     1:36 PM   Last 3 PDI Scores   Pain Disability Index (PDI) 50       INTERVAL HISTORY: (Newest visit at the bottom)     Interval History 11/1/23: Chris Nava is here for follow up. At last visit we planned to trial Celebrex and discuss with orthopaedics about left hip steroid injection. He states that he has not noticed significant difference since starting Celebrex. He states that while at work he experiences bilateral leg numbness within 20 minutes of standing and worries about falling due to loss of feeling. This numbness is relieved with leaning forward. He states he had an MRI ~5 years ago, and was told that he had bulging disc and degenerative disease. Denies fevers, night sweats, unintended weight loss, loss of bowel or bladder incontinence.     Interval History 1/31/2024: Chris Nava is here for follow-up of his left hip pain. He says the last 3 weeks the pain has been the worst it's been and he's now using a cane for ambulation. The pain is affecting his work. He saw Dr. Barrios (Ortho) and said he needs a KAYLA, but his weight would need to be less than 300lbs for him to operate. Per Dr. Barrios, if he got a steroid  "injection in his hip he wouldn't be able to operate for 6 months. He has been focusing on his diet for weight loss as his physical mobility is severely limited. He still intermittently gets numbness in both his legs while walking. Today, he walked from the parking garage to clinic and his legs did not go numb, but sometimes they will go numb when he's just walking to the bathroom at home. The numbness resolves when he sits and leans forward, but doesn't resolve when he leans forward while standing. He thinks the Celebrex is helping his pain -- he ran out a couple months ago and didn't have it for a week and his pain was significantly worse. He's also taking Cymbalta. He said he doesn't want opioids because they just make him "loopy," but they don't take away the pain. Medical marijuana helps him sleep through the night.      Interval History 8/22/24: Chris Nava returns for follow up. At our last visit we trialed a short dose of opioids to assist with pain prior to KAYLA. He returns today having undergone KAYLA with Dr. Barrios on 3/20/24. He presents today because he has been having trouble walking. He walks 30 yards and he has to sit down and lean forward because his legs go numb. We reviewed his MRI, which does show moderate canal stenosis.     6 weeks of Conservative therapy:  PT: Sept-Oct, 2023.   Chiro:  HEP:     Treatments / Medications: (Ice/Heat/NSAIDS/APAP/etc):  Ice  Heat  APAP  Cymbalta - helps  Celebrex  Marijuana QHS- helps    Interventional Pain Procedures:   May 2023 left hip iaCSi in [place], significant relief for 8hrs       Past Medical History:   Diagnosis Date    Abscess of finger of right hand 01/18/2024    Arthritis     Cellulitis of finger of right hand 01/17/2024    Hypertension     Kidney stones     Personal history of kidney stones 10/11/2023     Past Surgical History:   Procedure Laterality Date    ARTHROPLASTY OF HIP BY POSTERIOR APPROACH Left 3/20/2024    Procedure: ARTHROPLASTY, " HIP, TOTAL, POSTERIOR APPROACH: LEFT: DEPUY - ACTIS + PINNACLE;  Surgeon: Scot Barrios III, MD;  Location: [facility];  Service: Orthopedics;  Laterality: Left;    CYSTOSCOPY W/ LASER LITHOTRIPSY      JUSTIN surgery      removed uvula, tonsils and adenoids for JUSTIN    THYROID CYST EXCISION      2017    VASECTOMY       Social History     Socioeconomic History    Marital status:     Number of children: 2   Occupational History    Occupation:    Tobacco Use    Smoking status: Never     Passive exposure: Never    Smokeless tobacco: Never   Substance and Sexual Activity    Alcohol use: Yes     Comment: Maybe wine at dinner 1x per week, maybe bourbon 1 night p/wk    Drug use: Yes     Frequency: 7.0 times per week     Types: Marijuana     Comment: Sleep aid only for arthritic purposes. Use for 2 months only    Sexual activity: Yes     Partners: Female     Birth control/protection: Post-menopausal     Comment: I have a vasectomy, she is post menopausal   Social History Narrative    14     Social Determinants of Health     Financial Resource Strain: Low Risk  (1/29/2024)    Overall Financial Resource Strain (CARDIA)     Difficulty of Paying Living Expenses: Not very hard   Food Insecurity: No Food Insecurity (1/29/2024)    Hunger Vital Sign     Worried About Running Out of Food in the Last Year: Never true     Ran Out of Food in the Last Year: Never true   Transportation Needs: No Transportation Needs (1/29/2024)    PRAPARE - Transportation     Lack of Transportation (Medical): No     Lack of Transportation (Non-Medical): No   Physical Activity: Inactive (1/29/2024)    Exercise Vital Sign     Days of Exercise per Week: 0 days     Minutes of Exercise per Session: 0 min   Stress: No Stress Concern Present (1/29/2024)    [employer] - Occupational Stress Questionnaire     Feeling of Stress : Not at all   Recent Concern: Stress - Stress Concern Present (1/18/2024)    [employer] [place] [employer] - Occupational Stress Questionnaire     Feeling of Stress : To some extent   Housing Stability: Low Risk  (1/29/2024)    Housing Stability Vital Sign     Unable to Pay for Housing in the Last Year: No     Number of Places Lived in the Last Year: 2     Unstable Housing in the Last Year: No     Family History   Problem Relation Name Age of Onset    Arthritis Father Ralph Nava     Cancer Father Ralph Nava     Hearing loss Father Ralph Nava     Hypertension Father Ralph Nava     Hypertension Brother Burke Nava     Hypertension Brother Augusto Nava     Asthma Daughter Imani Nava     Hearing loss Maternal Uncle De Interiano     Cancer Maternal Grandmother Joanna Interiano     Arthritis Paternal Grandmother Ophelia Nava     Stroke Paternal Grandmother Ophelia Nava     Cancer Paternal Grandfather Eugenio Nava        Review of patient's allergies indicates:  No Known Allergies    Current Outpatient Medications   Medication Sig    allopurinoL (ZYLOPRIM) 100 MG tablet Take 200 mg by mouth nightly.    DULoxetine (CYMBALTA) 20 MG capsule Take 1 capsule (20 mg total) by mouth once daily.    INV allopurinol tablet 2 tablet BEDTIME (route: oral)    lisinopriL (PRINIVIL,ZESTRIL) 20 MG tablet Take 20 mg by mouth nightly.    acetaminophen (TYLENOL) 650 MG TbSR Take 1 tablet (650 mg total) by mouth every 8 (eight) hours.    aspirin (ECOTRIN) 81 MG EC tablet Take 1 tablet (81 mg total) by mouth 2 (two) times a day.    cefadroxil (DURICEF) 500 MG Cap Take 1 capsule (500 mg total) by mouth every 12 (twelve) hours. (Patient not taking: Reported on 5/7/2024)    celecoxib (CELEBREX) 200 MG capsule Take 1 capsule (200 mg total) by mouth once daily.    DULoxetine (CYMBALTA) 60 MG capsule Take 1 capsule (60 mg total) by mouth once daily. (Patient taking differently: Take 60 mg by mouth nightly.)    metFORMIN (GLUCOPHAGE-XR) 500 MG ER 24hr tablet Take 500 mg by mouth every  "morning.    multivit-min/ferrous fumarate (MULTI VITAMIN ORAL) Take 1 tablet by mouth once daily.    oxyCODONE (ROXICODONE) 5 MG immediate release tablet Take 1 tablet every 4-6 hours as needed for pain (Patient not taking: Reported on 5/7/2024)    pantoprazole (PROTONIX) 40 MG tablet Take 1 tablet (40 mg total) by mouth once daily.    potassium citrate (UROCIT-K 15) 15 mEq TbSR Take 1 tablet by mouth nightly. (Patient not taking: Reported on 8/22/2024)    senna-docusate 8.6-50 mg (SENNA WITH DOCUSATE SODIUM) 8.6-50 mg per tablet Take 1 tablet by mouth once daily. (Patient not taking: Reported on 5/7/2024)     No current facility-administered medications for this visit.       ROS:  GENERAL: No fever. No chills. No fatigue. Denies weight loss. Denies weight gain.  HEENT: Denies headaches. Denies vision change. Denies eye pain. Denies double vision. Denies ear pain.   CV: Denies chest pain.   PULM: Denies of shortness of breath.  GI: Denies constipation. No diarrhea. No abdominal pain. Denies nausea. Denies vomiting. No blood in stool.  HEME: Denies bleeding problems.  : Denies urgency. No painful urination. No blood in urine.  MS: See HPI.  SKIN: Denies rash.   NEURO: Denies seizures. No weakness.  PSYCH:  Denies difficulty sleeping. No anxiety. Denies depression. No suicidal thoughts.       VITALS:   Vitals:    08/22/24 1315   BP: 130/66   Pulse: 82   Resp: 18   Weight: (!) 143.8 kg (317 lb 0.3 oz)   Height: 5' 9" (1.753 m)   PainSc: 10-Worst pain ever   PainLoc: Leg   PHYSICAL EXAM:   GENERAL: Well appearing, in no acute distress, alert and oriented x3.  PSYCH:  Mood and affect appropriate.  SKIN: Skin color, texture, turgor normal, no rashes or lesions.  HEENT:  Normocephalic, atraumatic. Cranial nerves grossly intact.  NECK: No pain to palpation over the cervical paraspinous muscles. No pain to palpation over facets. No pain with neck flexion, extension, or lateral flexion.   PULM: No evidence of respiratory " difficulty, symmetric chest rise.  GI:  Non-distended  BACK: Normal range of motion. No pain to palpation over the spinous processes. No pain to palpation over facet joints. There is no pain with palpation over the sacroiliac joints bilaterally. Straight leg raising in the supine position is negative to radicular pain.   EXTREMITIES: No deformities, edema, or skin discoloration.   MUSCULOSKELETAL: Shoulder, hip, and knee provocative maneuvers are negative. Bilateral upper and lower extremity strength is normal and symmetric. No atrophy is noted.  NEURO: Sensation is equal and appropriate bilaterally. Bilateral upper and lower extremity coordination and muscle stretch reflexes are physiologic and symmetric. Plantar response are downgoing.   GAIT: normal.    LABS:  Lab Results   Component Value Date    WBC 8.21 2024    HGB 16.4 2024    HCT 48 2024    MCV 85 2024     2024     Lab Results   Component Value Date    CREATININE 0.8 2024    BUN 20 2024     2024    K 4.0 2024     2024    CO2 25 2024       IMAGIN/22/24 EXAMINATION: MRI LUMBAR SPINE WITHOUT CONTRAST     CLINICAL HISTORY: Low back pain, progressive neurologic deficit; Dorsalgia, unspecified  TECHNIQUE: Multiplanar, multisequence MR images were acquired from the thoracolumbar junction to the sacrum without contrast.  COMPARISON: None.     FINDINGS:  Alignment: Minimal retrolisthesis of L1-L2.     Vertebrae: Diffuse low T1 signal throughout the visualized axial skeleton.     Discs: Mild-to-moderate disc height loss throughout the lumbar spine.     Cord: Conus terminates at T12-L1 and appears unremarkable.  Cauda equina appears unremarkable.     Degenerative findings:     T12-L1: No spinal canal stenosis or neuroforaminal narrowing.  L1-L2: Circumferential disc bulge and mild facet arthropathy result in mild spinal canal stenosis and moderate left, mild right neural  foraminal narrowing.  L2-L3: Circumferential disc bulge and mild facet arthropathy result in mild effacement of the thecal sac and moderate bilateral neural foraminal narrowing.  L3-L4: Circumferential disc bulge with central protrusion and severe facet arthropathy result in moderate spinal canal stenosis and moderate bilateral neural foraminal narrowing.  L4-L5: Circumferential disc bulge and severe facet arthropathy result in mild bilateral neural foraminal narrowing.  L5-S1: Moderate facet arthropathy results in mild right neural foraminal narrowing.     Paraspinal muscles & soft tissues: Moderate paraspinal muscle atrophy.     Impression:  1. Diffusely abnormal bone marrow signal, which may be seen with red marrow hyperplasia or marrow infiltrative process.  Clinical evaluation advised.  2. Multilevel degenerative changes detailed above.  Moderate spinal canal stenosis noted at L3-L4.  Moderate neural foraminal narrowing noted at L1-L4.        ASSESSMENT: 54 y.o. year old male with pain, consistent with:    Encounter Diagnoses   Name Primary?    Spinal stenosis of lumbar region with neurogenic claudication Yes    Morbid obesity with BMI of 45.0-49.9, adult        DISCUSSION: Chris Nava is a  for [employer] who comes to us with left hip pain which is worst with standing and walking. He underwent KAYLA with Dr. Barrios with complete pain relief. He returned with limited walking duration causing low back pain and leg numbness requiring him to sit down and bend over. LMRI shows moderate canal stenosis at L3/4.     PLAN:  Reviewed MRI with Mr. Nava  Discussed treatment options  Recommend sleeping on side not back  Referred back to PT  Schedule B/L L4/5 TFESI  Discussed weight loss. He will get back in the weight loss mindset. He previously lost 100 lbs with willpower and dieting.   Follow up after PT to discuss options       Sofy Ibarra  08/22/2024

## 2024-08-23 DIAGNOSIS — M48.062 SPINAL STENOSIS OF LUMBAR REGION WITH NEUROGENIC CLAUDICATION: Primary | ICD-10-CM

## 2024-08-26 ENCOUNTER — PATIENT MESSAGE (OUTPATIENT)
Dept: PAIN MEDICINE | Facility: OTHER | Age: 54
End: 2024-08-26
Payer: COMMERCIAL

## 2024-08-26 ENCOUNTER — PATIENT MESSAGE (OUTPATIENT)
Dept: SPINE | Facility: CLINIC | Age: 54
End: 2024-08-26
Payer: COMMERCIAL

## 2024-08-27 ENCOUNTER — PATIENT MESSAGE (OUTPATIENT)
Dept: PAIN MEDICINE | Facility: OTHER | Age: 54
End: 2024-08-27
Payer: COMMERCIAL

## 2024-08-28 ENCOUNTER — PATIENT MESSAGE (OUTPATIENT)
Dept: SPINE | Facility: CLINIC | Age: 54
End: 2024-08-28
Payer: COMMERCIAL

## 2024-09-17 ENCOUNTER — HOSPITAL ENCOUNTER (OUTPATIENT)
Facility: OTHER | Age: 54
Discharge: HOME | End: 2024-09-17
Attending: ANESTHESIOLOGY | Admitting: ANESTHESIOLOGY
Payer: COMMERCIAL

## 2024-09-17 VITALS
TEMPERATURE: 98 F | WEIGHT: 300 LBS | RESPIRATION RATE: 15 BRPM | OXYGEN SATURATION: 98 % | HEIGHT: 69 IN | HEART RATE: 81 BPM | BODY MASS INDEX: 44.43 KG/M2 | SYSTOLIC BLOOD PRESSURE: 143 MMHG | DIASTOLIC BLOOD PRESSURE: 75 MMHG

## 2024-09-17 DIAGNOSIS — G89.29 CHRONIC PAIN: ICD-10-CM

## 2024-09-17 DIAGNOSIS — M54.16 LUMBAR RADICULOPATHY: Primary | ICD-10-CM

## 2024-09-17 PROCEDURE — 62323 NJX INTERLAMINAR LMBR/SAC: CPT | Mod: ,,, | Performed by: ANESTHESIOLOGY

## 2024-09-17 PROCEDURE — 25500020 PHARM REV CODE 255: Performed by: ANESTHESIOLOGY

## 2024-09-17 PROCEDURE — 63600175 PHARM REV CODE 636 W HCPCS: Performed by: ANESTHESIOLOGY

## 2024-09-17 PROCEDURE — 62323 NJX INTERLAMINAR LMBR/SAC: CPT | Performed by: ANESTHESIOLOGY

## 2024-09-17 PROCEDURE — 25000003 PHARM REV CODE 250: Performed by: ANESTHESIOLOGY

## 2024-09-17 RX ORDER — LIDOCAINE HYDROCHLORIDE 10 MG/ML
INJECTION, SOLUTION EPIDURAL; INFILTRATION; INTRACAUDAL; PERINEURAL
Status: DISCONTINUED | OUTPATIENT
Start: 2024-09-17 | End: 2024-09-17 | Stop reason: HOSPADM

## 2024-09-17 RX ORDER — ALPRAZOLAM 0.5 MG/1
1 TABLET, ORALLY DISINTEGRATING ORAL
Status: COMPLETED | OUTPATIENT
Start: 2024-09-17 | End: 2024-09-17

## 2024-09-17 RX ORDER — LIDOCAINE HYDROCHLORIDE 20 MG/ML
INJECTION, SOLUTION INFILTRATION; PERINEURAL
Status: DISCONTINUED | OUTPATIENT
Start: 2024-09-17 | End: 2024-09-17 | Stop reason: HOSPADM

## 2024-09-17 RX ORDER — DEXAMETHASONE SODIUM PHOSPHATE 10 MG/ML
INJECTION INTRAMUSCULAR; INTRAVENOUS
Status: DISCONTINUED | OUTPATIENT
Start: 2024-09-17 | End: 2024-09-17 | Stop reason: HOSPADM

## 2024-09-17 RX ORDER — ALPRAZOLAM 0.5 MG/1
0.5 TABLET, ORALLY DISINTEGRATING ORAL
Status: DISCONTINUED | OUTPATIENT
Start: 2024-09-17 | End: 2024-09-17

## 2024-09-17 RX ORDER — SODIUM CHLORIDE 9 MG/ML
INJECTION, SOLUTION INTRAVENOUS CONTINUOUS
Status: DISCONTINUED | OUTPATIENT
Start: 2024-09-17 | End: 2024-09-17 | Stop reason: HOSPADM

## 2024-09-17 RX ADMIN — ALPRAZOLAM 1 MG: 0.5 TABLET, ORALLY DISINTEGRATING ORAL at 09:09

## 2024-09-17 NOTE — DISCHARGE INSTRUCTIONS

## 2024-09-17 NOTE — DISCHARGE SUMMARY
Discharge Note  Short Stay      SUMMARY     Admit Date: 9/17/2024    Attending Physician: Sofy Ibarra     Discharge Physician: Buzz Lambert      Discharge Date: 9/17/2024 9:36 AM    Procedure(s) (LRB):  LUMBAR L4/5 RAMA (N/A)    Final Diagnosis: Spinal stenosis of lumbar region with neurogenic claudication [M48.062]    Disposition: Home or self care    Patient Instructions:   Current Discharge Medication List        CONTINUE these medications which have NOT CHANGED    Details   allopurinoL (ZYLOPRIM) 100 MG tablet Take 200 mg by mouth nightly.      DULoxetine (CYMBALTA) 20 MG capsule Take 1 capsule (20 mg total) by mouth once daily.  Qty: 90 capsule, Refills: 3    Associated Diagnoses: Arthritis      INV allopurinol tablet 2 tablet BEDTIME (route: oral)      lisinopriL (PRINIVIL,ZESTRIL) 20 MG tablet Take 20 mg by mouth nightly.      potassium citrate (UROCIT-K 15) 15 mEq TbSR Take 1 tablet by mouth nightly.                 Discharge Diagnosis: Spinal stenosis of lumbar region with neurogenic claudication [M48.062]  Condition on Discharge: Stable with no complications to procedure   Diet on Discharge: Same as before.  Activity: as per instruction sheet.  Discharge to: Home with a responsible adult.  Follow up: 2-4 weeks       Please call my office or on call number at [phone] or pager at [phone] if experienced any weakness or loss of sensation, fever > 101.5, pain uncontrolled with oral medications, persistent nausea/vomiting/or diarrhea, redness or drainage from the incisions, or any other worrisome concerns. If physician on call was not reached or could not communicate with our office for any reason please go to the nearest emergency department        Buzz Lambert, DO  [facility]

## 2024-09-17 NOTE — OP NOTE
Lumbar Interlaminar Epidural Steroid Injection under Fluoroscopic Guidance    The procedure, risks, benefits, and options were discussed with the patient. There are no contraindications to the procedure. The patent expressed understanding and agreed to the procedure. Informed written consent was obtained prior to the start of the procedure and can be found in the patient's chart.    PATIENT NAME: Chris Nava   MRN: [id number]     DATE OF PROCEDURE: 09/17/2024    PROCEDURE: Lumbar Interlaminar Epidural Steroid Injection L4/L5 under Fluoroscopic Guidance    PRE-OP DIAGNOSIS: Spinal stenosis of lumbar region with neurogenic claudication [M48.062] Lumbar radiculopathy [M54.16]    POST-OP DIAGNOSIS: Same    PHYSICIAN: Sofy Ibarra MD    ASSISTANTS: none     MEDICATIONS INJECTED: Preservative-free Decadron 10mg with 4cc of Lidocaine 1% MPF and preservative free normal saline    LOCAL ANESTHETIC INJECTED: Xylocaine 2%     SEDATION: None    ESTIMATED BLOOD LOSS: None    COMPLICATIONS: None    TECHNIQUE: Time-out was performed to identify the patient and procedure to be performed. With the patient laying in a prone position, the surgical area was prepped and draped in the usual sterile fashion using ChloraPrep and a fenestrated drape. The level was determined under fluoroscopy guidance. Skin anesthesia was achieved by injecting Lidocaine 2% over the injection site. The interlaminar space was then approached with a 20 gauge,  3.5 inch Tuohy needle that was introduced under fluoroscopic guidance in the AP, lateral and/or contralateral oblique imaging. Once the Ligamentum flavum was encountered loss of resistance to air was used to enter the epidural space. With positive loss of resistance and negative aspiration for CSF or Blood, contrast dye Omnipaque (300mg/mL) was injected to confirm placement and there was no vascular runoff. 5 mL of the medication mixture listed above was injected slowly. Displacement of the radio  opaque contrast after injection of the medication confirmed that the medication went into the area of the epidural space. The needles were removed and bleeding was nil. A sterile dressing was applied. No specimens collected. The patient tolerated the procedure well.       The patient was monitored after the procedure in the recovery area. They were given post-procedure and discharge instructions to follow at home. The patient was discharged in a stable condition.    Buzz Lambert DO

## 2024-09-18 ENCOUNTER — PATIENT MESSAGE (OUTPATIENT)
Dept: SPINE | Facility: CLINIC | Age: 54
End: 2024-09-18
Payer: COMMERCIAL

## 2024-09-18 DIAGNOSIS — E66.01 CLASS 3 SEVERE OBESITY WITH BODY MASS INDEX (BMI) OF 40.0 TO 44.9 IN ADULT, UNSPECIFIED OBESITY TYPE, UNSPECIFIED WHETHER SERIOUS COMORBIDITY PRESENT: Primary | ICD-10-CM

## 2024-10-28 ENCOUNTER — TELEPHONE (OUTPATIENT)
Dept: PAIN MEDICINE | Facility: CLINIC | Age: 54
End: 2024-10-28
Payer: COMMERCIAL

## 2024-10-31 ENCOUNTER — TELEPHONE (OUTPATIENT)
Dept: BARIATRICS | Facility: CLINIC | Age: 54
End: 2024-10-31
Payer: COMMERCIAL

## 2024-11-06 ENCOUNTER — OFFICE VISIT (OUTPATIENT)
Dept: BARIATRICS | Facility: CLINIC | Age: 54
End: 2024-11-06
Payer: COMMERCIAL

## 2024-11-06 VITALS
SYSTOLIC BLOOD PRESSURE: 130 MMHG | WEIGHT: 315 LBS | HEIGHT: 69 IN | OXYGEN SATURATION: 97 % | BODY MASS INDEX: 46.65 KG/M2 | HEART RATE: 83 BPM | DIASTOLIC BLOOD PRESSURE: 82 MMHG

## 2024-11-06 DIAGNOSIS — G47.33 OSA (OBSTRUCTIVE SLEEP APNEA): ICD-10-CM

## 2024-11-06 DIAGNOSIS — Z71.3 ENCOUNTER FOR WEIGHT LOSS COUNSELING: ICD-10-CM

## 2024-11-06 DIAGNOSIS — I10 PRIMARY HYPERTENSION: ICD-10-CM

## 2024-11-06 DIAGNOSIS — E66.813 CLASS 3 SEVERE OBESITY DUE TO EXCESS CALORIES WITH SERIOUS COMORBIDITY AND BODY MASS INDEX (BMI) OF 50.0 TO 59.9 IN ADULT: Primary | ICD-10-CM

## 2024-11-06 DIAGNOSIS — E66.01 CLASS 3 SEVERE OBESITY DUE TO EXCESS CALORIES WITH SERIOUS COMORBIDITY AND BODY MASS INDEX (BMI) OF 50.0 TO 59.9 IN ADULT: Primary | ICD-10-CM

## 2024-11-06 PROCEDURE — 3075F SYST BP GE 130 - 139MM HG: CPT | Mod: CPTII,S$GLB,, | Performed by: STUDENT IN AN ORGANIZED HEALTH CARE EDUCATION/TRAINING PROGRAM

## 2024-11-06 PROCEDURE — 3079F DIAST BP 80-89 MM HG: CPT | Mod: CPTII,S$GLB,, | Performed by: STUDENT IN AN ORGANIZED HEALTH CARE EDUCATION/TRAINING PROGRAM

## 2024-11-06 PROCEDURE — 1160F RVW MEDS BY RX/DR IN RCRD: CPT | Mod: CPTII,S$GLB,, | Performed by: STUDENT IN AN ORGANIZED HEALTH CARE EDUCATION/TRAINING PROGRAM

## 2024-11-06 PROCEDURE — 99204 OFFICE O/P NEW MOD 45 MIN: CPT | Mod: S$GLB,,, | Performed by: STUDENT IN AN ORGANIZED HEALTH CARE EDUCATION/TRAINING PROGRAM

## 2024-11-06 PROCEDURE — 4010F ACE/ARB THERAPY RXD/TAKEN: CPT | Mod: CPTII,S$GLB,, | Performed by: STUDENT IN AN ORGANIZED HEALTH CARE EDUCATION/TRAINING PROGRAM

## 2024-11-06 PROCEDURE — 1159F MED LIST DOCD IN RCRD: CPT | Mod: CPTII,S$GLB,, | Performed by: STUDENT IN AN ORGANIZED HEALTH CARE EDUCATION/TRAINING PROGRAM

## 2024-11-06 PROCEDURE — 99999 PR PBB SHADOW E&M-EST. PATIENT-LVL III: CPT | Mod: PBBFAC,,, | Performed by: STUDENT IN AN ORGANIZED HEALTH CARE EDUCATION/TRAINING PROGRAM

## 2024-11-06 PROCEDURE — 3008F BODY MASS INDEX DOCD: CPT | Mod: CPTII,S$GLB,, | Performed by: STUDENT IN AN ORGANIZED HEALTH CARE EDUCATION/TRAINING PROGRAM

## 2024-11-06 RX ORDER — PREGABALIN 75 MG/1
CAPSULE ORAL
COMMUNITY
Start: 2024-10-04

## 2024-11-06 RX ORDER — TIRZEPATIDE 10 MG/.5ML
10 INJECTION, SOLUTION SUBCUTANEOUS
Qty: 4 PEN | Refills: 2 | Status: SHIPPED | OUTPATIENT
Start: 2025-01-01

## 2024-11-06 RX ORDER — TIRZEPATIDE 7.5 MG/.5ML
7.5 INJECTION, SOLUTION SUBCUTANEOUS
Qty: 4 PEN | Refills: 0 | Status: SHIPPED | OUTPATIENT
Start: 2024-12-04

## 2024-11-06 RX ORDER — TIRZEPATIDE 5 MG/.5ML
5 INJECTION, SOLUTION SUBCUTANEOUS
Qty: 4 PEN | Refills: 0 | Status: SHIPPED | OUTPATIENT
Start: 2024-11-06

## 2024-11-06 NOTE — PROGRESS NOTES
Subjective     Patient ID: Chris Nava is a 54 y.o. male.    Chief Complaint: Consult and Obesity    Patient presents for treatment of obesity.   At highest was 425 lbs, got down under 300 lbs for hip replacement  Has regained some    Co-morbidities   HTN  JUSTIN  H/o kidney stones, laser lithotripsy    Denies personal and family history of thyroid cancer     Current Physical Activity  Hip replaced in March 2024    Current Eating Habits  Protein and salad or beans  Water, coffee, maxi tea    Medical Weight Loss  11/6/2024: 341.2 lbs, BMI 50.4, BFP 51.1%, .1 lbs, SMM 92.6 lbs, BMR 2006 kcal      Review of Systems   Constitutional:  Negative for chills and fever.   Respiratory:  Negative for shortness of breath.    Cardiovascular:  Negative for chest pain.   Gastrointestinal:  Negative for abdominal pain, nausea and vomiting.   Neurological:  Negative for dizziness and light-headedness.          Objective    Latest Reference Range & Units 03/20/24 18:03 03/20/24 21:11 03/21/24 03:31 03/21/24 06:11   POC Sodium 136 - 145 mmol/L   141    POC Potassium 3.5 - 5.1 mmol/L   4.1    POC Chloride 95 - 110 mmol/L   104    POC BUN 6 - 30 mg/dL   15    POCT Glucose 70 - 110 mg/dL 114 (H) 151 (H)  104   Sample    VENOUS    POC Ionized Calcium 1.06 - 1.42 mmol/L   1.13    POC Glucose 70 - 110 mg/dL   126 (H)    POC Hematocrit 36 - 54 %PCV   48    POC TCO2 (MEASURED) 23 - 29 mmol/L   23    POC Creatinine 0.5 - 1.4 mg/dL   0.6    (H): Data is abnormally high    Vitals:    11/06/24 1549   BP: 130/82   Pulse: 83       Physical Exam  Vitals reviewed.   Constitutional:       General: He is not in acute distress.     Appearance: Normal appearance. He is obese. He is not ill-appearing, toxic-appearing or diaphoretic.   HENT:      Head: Normocephalic and atraumatic.   Cardiovascular:      Rate and Rhythm: Normal rate.   Pulmonary:      Effort: Pulmonary effort is normal. No respiratory distress.   Skin:     General: Skin is  warm and dry.   Neurological:      Mental Status: He is alert and oriented to person, place, and time.            Assessment and Plan     1. Class 3 severe obesity due to excess calories with serious comorbidity and body mass index (BMI) of 50.0 to 59.9 in adult  -     tirzepatide, weight loss, (ZEPBOUND) 5 mg/0.5 mL PnIj; Inject 5 mg into the skin every 7 days.  Dispense: 4 Pen; Refill: 0  -     tirzepatide, weight loss, (ZEPBOUND) 7.5 mg/0.5 mL PnIj; Inject 7.5 mg into the skin every 7 days.  Dispense: 4 Pen; Refill: 0  -     tirzepatide, weight loss, (ZEPBOUND) 10 mg/0.5 mL PnIj; Inject 10 mg into the skin every 7 days.  Dispense: 4 Pen; Refill: 2    2. Primary hypertension  -     tirzepatide, weight loss, (ZEPBOUND) 5 mg/0.5 mL PnIj; Inject 5 mg into the skin every 7 days.  Dispense: 4 Pen; Refill: 0  -     tirzepatide, weight loss, (ZEPBOUND) 7.5 mg/0.5 mL PnIj; Inject 7.5 mg into the skin every 7 days.  Dispense: 4 Pen; Refill: 0  -     tirzepatide, weight loss, (ZEPBOUND) 10 mg/0.5 mL PnIj; Inject 10 mg into the skin every 7 days.  Dispense: 4 Pen; Refill: 2    3. JUSTIN (obstructive sleep apnea)  -     tirzepatide, weight loss, (ZEPBOUND) 5 mg/0.5 mL PnIj; Inject 5 mg into the skin every 7 days.  Dispense: 4 Pen; Refill: 0  -     tirzepatide, weight loss, (ZEPBOUND) 7.5 mg/0.5 mL PnIj; Inject 7.5 mg into the skin every 7 days.  Dispense: 4 Pen; Refill: 0  -     tirzepatide, weight loss, (ZEPBOUND) 10 mg/0.5 mL PnIj; Inject 10 mg into the skin every 7 days.  Dispense: 4 Pen; Refill: 2    4. Encounter for weight loss counseling      - Zepbound weekly injections    - Log all food and beverage intake with a daily calorie goal of 1800 calories per day    - Activity as tolerated

## 2024-11-12 ENCOUNTER — PATIENT MESSAGE (OUTPATIENT)
Dept: BARIATRICS | Facility: CLINIC | Age: 54
End: 2024-11-12
Payer: COMMERCIAL

## 2024-11-15 ENCOUNTER — TELEPHONE (OUTPATIENT)
Dept: PAIN MEDICINE | Facility: CLINIC | Age: 54
End: 2024-11-15
Payer: COMMERCIAL

## 2024-11-15 NOTE — TELEPHONE ENCOUNTER
Patient wants to know did he have a MRI put in this past summer and wether or not he can get a new order sent in.

## 2024-11-15 NOTE — TELEPHONE ENCOUNTER
----- Message from Sherrill sent at 11/14/2024  2:48 PM CST -----  Contact: Patient  Type:  Patient Call          Who Called: Patient         Does the patient know what this is regarding?: Requesting a call back patient would like to know if he had a MRI order over the summer ; patient need to know if he can obtain the records before Monday ; please advise           Would the patient rather a call back or a response via [facility]? Call           Best Call Back Number: [phone]             Additional Information:

## 2024-11-29 DIAGNOSIS — M19.90 ARTHRITIS: ICD-10-CM

## 2024-11-29 RX ORDER — DULOXETIN HYDROCHLORIDE 60 MG/1
60 CAPSULE, DELAYED RELEASE ORAL
Qty: 90 CAPSULE | Refills: 1 | OUTPATIENT
Start: 2024-11-29

## 2024-11-29 RX ORDER — DULOXETIN HYDROCHLORIDE 20 MG/1
20 CAPSULE, DELAYED RELEASE ORAL
Qty: 90 CAPSULE | Refills: 1 | OUTPATIENT
Start: 2024-11-29

## 2024-11-29 NOTE — TELEPHONE ENCOUNTER
LOV: 3/6/24  No Upcoming OV  LF duloxetine hcl dr 60mg cap: 8/31/24  LF duloxetine hcl dr 20mg cap: 8/31/24

## 2025-01-27 ENCOUNTER — PATIENT MESSAGE (OUTPATIENT)
Dept: BARIATRICS | Facility: CLINIC | Age: 55
End: 2025-01-27
Payer: COMMERCIAL

## 2025-02-03 ENCOUNTER — OFFICE VISIT (OUTPATIENT)
Dept: BARIATRICS | Facility: CLINIC | Age: 55
End: 2025-02-03
Payer: COMMERCIAL

## 2025-02-03 VITALS
HEIGHT: 69 IN | HEART RATE: 88 BPM | BODY MASS INDEX: 46.65 KG/M2 | OXYGEN SATURATION: 96 % | DIASTOLIC BLOOD PRESSURE: 82 MMHG | SYSTOLIC BLOOD PRESSURE: 132 MMHG | WEIGHT: 315 LBS

## 2025-02-03 DIAGNOSIS — E66.01 CLASS 3 SEVERE OBESITY DUE TO EXCESS CALORIES WITH SERIOUS COMORBIDITY AND BODY MASS INDEX (BMI) OF 50.0 TO 59.9 IN ADULT: Primary | ICD-10-CM

## 2025-02-03 DIAGNOSIS — Z71.3 ENCOUNTER FOR WEIGHT LOSS COUNSELING: ICD-10-CM

## 2025-02-03 DIAGNOSIS — I10 PRIMARY HYPERTENSION: ICD-10-CM

## 2025-02-03 DIAGNOSIS — G47.33 OSA (OBSTRUCTIVE SLEEP APNEA): ICD-10-CM

## 2025-02-03 DIAGNOSIS — E66.813 CLASS 3 SEVERE OBESITY DUE TO EXCESS CALORIES WITH SERIOUS COMORBIDITY AND BODY MASS INDEX (BMI) OF 50.0 TO 59.9 IN ADULT: Primary | ICD-10-CM

## 2025-02-03 PROCEDURE — 3075F SYST BP GE 130 - 139MM HG: CPT | Mod: CPTII,S$GLB,, | Performed by: STUDENT IN AN ORGANIZED HEALTH CARE EDUCATION/TRAINING PROGRAM

## 2025-02-03 PROCEDURE — 99999 PR PBB SHADOW E&M-EST. PATIENT-LVL III: CPT | Mod: PBBFAC,,, | Performed by: STUDENT IN AN ORGANIZED HEALTH CARE EDUCATION/TRAINING PROGRAM

## 2025-02-03 PROCEDURE — 3008F BODY MASS INDEX DOCD: CPT | Mod: CPTII,S$GLB,, | Performed by: STUDENT IN AN ORGANIZED HEALTH CARE EDUCATION/TRAINING PROGRAM

## 2025-02-03 PROCEDURE — 99213 OFFICE O/P EST LOW 20 MIN: CPT | Mod: S$GLB,,, | Performed by: STUDENT IN AN ORGANIZED HEALTH CARE EDUCATION/TRAINING PROGRAM

## 2025-02-03 PROCEDURE — 3079F DIAST BP 80-89 MM HG: CPT | Mod: CPTII,S$GLB,, | Performed by: STUDENT IN AN ORGANIZED HEALTH CARE EDUCATION/TRAINING PROGRAM

## 2025-02-03 PROCEDURE — 1160F RVW MEDS BY RX/DR IN RCRD: CPT | Mod: CPTII,S$GLB,, | Performed by: STUDENT IN AN ORGANIZED HEALTH CARE EDUCATION/TRAINING PROGRAM

## 2025-02-03 PROCEDURE — 1159F MED LIST DOCD IN RCRD: CPT | Mod: CPTII,S$GLB,, | Performed by: STUDENT IN AN ORGANIZED HEALTH CARE EDUCATION/TRAINING PROGRAM

## 2025-02-03 RX ORDER — DULOXETIN HYDROCHLORIDE 20 MG/1
2 CAPSULE, DELAYED RELEASE ORAL DAILY
COMMUNITY
End: 2025-02-11 | Stop reason: SDUPTHER

## 2025-02-03 RX ORDER — CYCLOBENZAPRINE HCL 10 MG
10 TABLET ORAL
COMMUNITY
Start: 2024-12-19 | End: 2025-02-11

## 2025-02-03 NOTE — PROGRESS NOTES
Subjective     Patient ID: Chris Nava is a 54 y.o. male.    Chief Complaint: Follow-up, Obesity, and Weight Check    Patient presents for treatment of obesity.   At highest was 425 lbs, got down under 300 lbs for hip replacement  Has regained some    Co-morbidities   HTN  JUSTIN  H/o kidney stones, laser lithotripsy    Denies personal and family history of thyroid cancer     Current Physical Activity  Hip replaced in March 2024    Current Eating Habits  Protein and salad or beans  Water, coffee, maxi tea    Medical Weight Loss  11/6/2024: 341.2 lbs, BMI 50.4, BFP 51.1%, .1 lbs, SMM 92.6 lbs, BMR 2006 kcal  2/3/2025: 356.4 lbs, BMI 52.6, BFP 51.3%, .7 lbs, SMM 96.3 lbs, BMR 2071 kcal      Review of Systems   Constitutional:  Negative for chills and fever.   Respiratory:  Negative for shortness of breath.    Cardiovascular:  Negative for chest pain.   Gastrointestinal:  Negative for abdominal pain, nausea and vomiting.   Neurological:  Negative for dizziness and light-headedness.          Objective    Latest Reference Range & Units 03/20/24 18:03 03/20/24 21:11 03/21/24 03:31 03/21/24 06:11   POC Sodium 136 - 145 mmol/L   141    POC Potassium 3.5 - 5.1 mmol/L   4.1    POC Chloride 95 - 110 mmol/L   104    POC BUN 6 - 30 mg/dL   15    POCT Glucose 70 - 110 mg/dL 114 (H) 151 (H)  104   Sample    VENOUS    POC Ionized Calcium 1.06 - 1.42 mmol/L   1.13    POC Glucose 70 - 110 mg/dL   126 (H)    POC Hematocrit 36 - 54 %PCV   48    POC TCO2 (MEASURED) 23 - 29 mmol/L   23    POC Creatinine 0.5 - 1.4 mg/dL   0.6    (H): Data is abnormally high    Vitals:    02/03/25 1540   BP: 132/82   Pulse: 88         Physical Exam  Vitals reviewed.   Constitutional:       General: He is not in acute distress.     Appearance: Normal appearance. He is obese. He is not ill-appearing, toxic-appearing or diaphoretic.   HENT:      Head: Normocephalic and atraumatic.   Cardiovascular:      Rate and Rhythm: Normal rate.    Pulmonary:      Effort: Pulmonary effort is normal. No respiratory distress.   Skin:     General: Skin is warm and dry.   Neurological:      Mental Status: He is alert and oriented to person, place, and time.            Assessment and Plan     1. Class 3 severe obesity due to excess calories with serious comorbidity and body mass index (BMI) of 50.0 to 59.9 in adult    2. JUSTIN (obstructive sleep apnea)    3. Primary hypertension    4. Encounter for weight loss counseling      - Log all food and beverage intake with a daily calorie goal of 1800 calories per day    - Activity as tolerated

## 2025-02-11 ENCOUNTER — OFFICE VISIT (OUTPATIENT)
Dept: INTERNAL MEDICINE | Facility: CLINIC | Age: 55
End: 2025-02-11
Payer: COMMERCIAL

## 2025-02-11 ENCOUNTER — LAB VISIT (OUTPATIENT)
Dept: LAB | Facility: HOSPITAL | Age: 55
End: 2025-02-11
Payer: COMMERCIAL

## 2025-02-11 VITALS
TEMPERATURE: 98 F | HEART RATE: 76 BPM | WEIGHT: 315 LBS | BODY MASS INDEX: 49.44 KG/M2 | DIASTOLIC BLOOD PRESSURE: 84 MMHG | OXYGEN SATURATION: 96 % | HEIGHT: 67 IN | RESPIRATION RATE: 18 BRPM | SYSTOLIC BLOOD PRESSURE: 132 MMHG

## 2025-02-11 DIAGNOSIS — Z12.5 ENCOUNTER FOR PROSTATE CANCER SCREENING: ICD-10-CM

## 2025-02-11 DIAGNOSIS — E66.813 CLASS 3 SEVERE OBESITY DUE TO EXCESS CALORIES WITH SERIOUS COMORBIDITY AND BODY MASS INDEX (BMI) OF 50.0 TO 59.9 IN ADULT: ICD-10-CM

## 2025-02-11 DIAGNOSIS — Z87.442 PERSONAL HISTORY OF KIDNEY STONES: ICD-10-CM

## 2025-02-11 DIAGNOSIS — Z00.00 ENCOUNTER FOR ANNUAL HEALTH EXAMINATION: Primary | ICD-10-CM

## 2025-02-11 DIAGNOSIS — M87.052 AVASCULAR NECROSIS OF BONE OF LEFT HIP: ICD-10-CM

## 2025-02-11 DIAGNOSIS — G47.00 INSOMNIA, UNSPECIFIED TYPE: ICD-10-CM

## 2025-02-11 DIAGNOSIS — G47.33 OSA (OBSTRUCTIVE SLEEP APNEA): ICD-10-CM

## 2025-02-11 DIAGNOSIS — E66.01 CLASS 3 SEVERE OBESITY DUE TO EXCESS CALORIES WITH SERIOUS COMORBIDITY AND BODY MASS INDEX (BMI) OF 50.0 TO 59.9 IN ADULT: ICD-10-CM

## 2025-02-11 DIAGNOSIS — I10 PRIMARY HYPERTENSION: ICD-10-CM

## 2025-02-11 DIAGNOSIS — Z71.84 COUNSELING FOR TRAVEL: ICD-10-CM

## 2025-02-11 DIAGNOSIS — Z00.00 ENCOUNTER FOR ANNUAL HEALTH EXAMINATION: ICD-10-CM

## 2025-02-11 DIAGNOSIS — Z23 NEED FOR VACCINATION: ICD-10-CM

## 2025-02-11 DIAGNOSIS — F32.A DEPRESSION, UNSPECIFIED DEPRESSION TYPE: ICD-10-CM

## 2025-02-11 DIAGNOSIS — M48.061 SPINAL STENOSIS OF LUMBAR REGION WITHOUT NEUROGENIC CLAUDICATION: ICD-10-CM

## 2025-02-11 LAB
ALBUMIN SERPL BCP-MCNC: 4.1 G/DL (ref 3.5–5.2)
ALP SERPL-CCNC: 81 U/L (ref 40–150)
ALT SERPL W/O P-5'-P-CCNC: 38 U/L (ref 10–44)
ANION GAP SERPL CALC-SCNC: 10 MMOL/L (ref 8–16)
AST SERPL-CCNC: 30 U/L (ref 10–40)
BASOPHILS # BLD AUTO: 0.06 K/UL (ref 0–0.2)
BASOPHILS NFR BLD: 0.5 % (ref 0–1.9)
BILIRUB SERPL-MCNC: 0.5 MG/DL (ref 0.1–1)
BUN SERPL-MCNC: 21 MG/DL (ref 6–20)
CALCIUM SERPL-MCNC: 9.7 MG/DL (ref 8.7–10.5)
CHLORIDE SERPL-SCNC: 104 MMOL/L (ref 95–110)
CHOLEST SERPL-MCNC: 193 MG/DL (ref 120–199)
CHOLEST/HDLC SERPL: 4.4 {RATIO} (ref 2–5)
CO2 SERPL-SCNC: 21 MMOL/L (ref 23–29)
CREAT SERPL-MCNC: 0.8 MG/DL (ref 0.5–1.4)
DIFFERENTIAL METHOD BLD: ABNORMAL
EOSINOPHIL # BLD AUTO: 0.2 K/UL (ref 0–0.5)
EOSINOPHIL NFR BLD: 1.6 % (ref 0–8)
ERYTHROCYTE [DISTWIDTH] IN BLOOD BY AUTOMATED COUNT: 14.3 % (ref 11.5–14.5)
EST. GFR  (NO RACE VARIABLE): >60 ML/MIN/1.73 M^2
GLUCOSE SERPL-MCNC: 86 MG/DL (ref 70–110)
HCT VFR BLD AUTO: 55 % (ref 40–54)
HDLC SERPL-MCNC: 44 MG/DL (ref 40–75)
HDLC SERPL: 22.8 % (ref 20–50)
HGB BLD-MCNC: 17.1 G/DL (ref 14–18)
IMM GRANULOCYTES # BLD AUTO: 0.06 K/UL (ref 0–0.04)
IMM GRANULOCYTES NFR BLD AUTO: 0.5 % (ref 0–0.5)
LDLC SERPL CALC-MCNC: 130.4 MG/DL (ref 63–159)
LYMPHOCYTES # BLD AUTO: 2.7 K/UL (ref 1–4.8)
LYMPHOCYTES NFR BLD: 23.9 % (ref 18–48)
MCH RBC QN AUTO: 26.8 PG (ref 27–31)
MCHC RBC AUTO-ENTMCNC: 31.1 G/DL (ref 32–36)
MCV RBC AUTO: 86 FL (ref 82–98)
MONOCYTES # BLD AUTO: 0.9 K/UL (ref 0.3–1)
MONOCYTES NFR BLD: 8.4 % (ref 4–15)
NEUTROPHILS # BLD AUTO: 7.3 K/UL (ref 1.8–7.7)
NEUTROPHILS NFR BLD: 65.1 % (ref 38–73)
NONHDLC SERPL-MCNC: 149 MG/DL
NRBC BLD-RTO: 0 /100 WBC
PLATELET # BLD AUTO: 310 K/UL (ref 150–450)
PMV BLD AUTO: 10.4 FL (ref 9.2–12.9)
POTASSIUM SERPL-SCNC: 4.6 MMOL/L (ref 3.5–5.1)
PROT SERPL-MCNC: 7.8 G/DL (ref 6–8.4)
RBC # BLD AUTO: 6.37 M/UL (ref 4.6–6.2)
SODIUM SERPL-SCNC: 135 MMOL/L (ref 136–145)
TRIGL SERPL-MCNC: 93 MG/DL (ref 30–150)
WBC # BLD AUTO: 11.22 K/UL (ref 3.9–12.7)

## 2025-02-11 PROCEDURE — 1160F RVW MEDS BY RX/DR IN RCRD: CPT | Mod: CPTII,S$GLB,, | Performed by: NURSE PRACTITIONER

## 2025-02-11 PROCEDURE — 85025 COMPLETE CBC W/AUTO DIFF WBC: CPT | Performed by: NURSE PRACTITIONER

## 2025-02-11 PROCEDURE — 3075F SYST BP GE 130 - 139MM HG: CPT | Mod: CPTII,S$GLB,, | Performed by: NURSE PRACTITIONER

## 2025-02-11 PROCEDURE — 99999 PR PBB SHADOW E&M-EST. PATIENT-LVL IV: CPT | Mod: PBBFAC,,, | Performed by: NURSE PRACTITIONER

## 2025-02-11 PROCEDURE — 80053 COMPREHEN METABOLIC PANEL: CPT | Performed by: NURSE PRACTITIONER

## 2025-02-11 PROCEDURE — 36415 COLL VENOUS BLD VENIPUNCTURE: CPT | Mod: PO | Performed by: NURSE PRACTITIONER

## 2025-02-11 PROCEDURE — 90471 IMMUNIZATION ADMIN: CPT | Mod: S$GLB,,, | Performed by: NURSE PRACTITIONER

## 2025-02-11 PROCEDURE — 1159F MED LIST DOCD IN RCRD: CPT | Mod: CPTII,S$GLB,, | Performed by: NURSE PRACTITIONER

## 2025-02-11 PROCEDURE — 84153 ASSAY OF PSA TOTAL: CPT | Performed by: NURSE PRACTITIONER

## 2025-02-11 PROCEDURE — 3079F DIAST BP 80-89 MM HG: CPT | Mod: CPTII,S$GLB,, | Performed by: NURSE PRACTITIONER

## 2025-02-11 PROCEDURE — 4010F ACE/ARB THERAPY RXD/TAKEN: CPT | Mod: CPTII,S$GLB,, | Performed by: NURSE PRACTITIONER

## 2025-02-11 PROCEDURE — 90677 PCV20 VACCINE IM: CPT | Mod: S$GLB,,, | Performed by: NURSE PRACTITIONER

## 2025-02-11 PROCEDURE — 3008F BODY MASS INDEX DOCD: CPT | Mod: CPTII,S$GLB,, | Performed by: NURSE PRACTITIONER

## 2025-02-11 PROCEDURE — 84443 ASSAY THYROID STIM HORMONE: CPT | Performed by: NURSE PRACTITIONER

## 2025-02-11 PROCEDURE — 80061 LIPID PANEL: CPT | Performed by: NURSE PRACTITIONER

## 2025-02-11 PROCEDURE — 99396 PREV VISIT EST AGE 40-64: CPT | Mod: 25,S$GLB,, | Performed by: NURSE PRACTITIONER

## 2025-02-11 RX ORDER — SCOPOLAMINE 1 MG/3D
1 PATCH, EXTENDED RELEASE TRANSDERMAL
Qty: 4 PATCH | Refills: 0 | Status: SHIPPED | OUTPATIENT
Start: 2025-02-11

## 2025-02-11 RX ORDER — DULOXETINE 40 MG/1
40 CAPSULE, DELAYED RELEASE ORAL DAILY
Qty: 90 CAPSULE | Refills: 3 | Status: SHIPPED | OUTPATIENT
Start: 2025-02-11

## 2025-02-11 RX ORDER — AMITRIPTYLINE HYDROCHLORIDE 10 MG/1
10-20 TABLET, FILM COATED ORAL NIGHTLY PRN
Qty: 6 TABLET | Refills: 0 | Status: SHIPPED | OUTPATIENT
Start: 2025-02-11 | End: 2026-02-11

## 2025-02-11 RX ORDER — TRAZODONE HYDROCHLORIDE 50 MG/1
50-100 TABLET ORAL NIGHTLY PRN
Qty: 6 TABLET | Refills: 0 | Status: SHIPPED | OUTPATIENT
Start: 2025-02-11 | End: 2026-02-11

## 2025-02-11 RX ORDER — LISINOPRIL 20 MG/1
20 TABLET ORAL NIGHTLY
Qty: 90 TABLET | Refills: 3 | Status: SHIPPED | OUTPATIENT
Start: 2025-02-11

## 2025-02-11 RX ORDER — ALLOPURINOL 100 MG/1
200 TABLET ORAL DAILY
Qty: 180 TABLET | Refills: 3 | Status: SHIPPED | OUTPATIENT
Start: 2025-02-11

## 2025-02-11 NOTE — PROGRESS NOTES
"Subjective:       Patient ID: Chris Nava is a 54 y.o. male.    Chief Complaint: Annual Wellness Visit    Chris Nava is a 54 y.o. male who presents today for an annual wellness visit.     SURGICAL HISTORY:  He underwent total hip replacement in March 2023 and laminectomy on December 17th, 2023. Prior to hip surgery, he experienced leg numbness with standing/walking, resulting in multiple falls at home.    ENT:  He reports nasal congestion that began in fall, occurring when eating and resulting in complete nasal obstruction requiring mouth breathing. Symptoms begin when food is ordered, even before meal arrival. He denies history of allergies. He notes a well-defined, round, firm neck mass present without changes in size or tenderness.    SLEEP:  He uses CPAP machine at pressure setting of 11. He reports difficulty falling asleep with racing thoughts. Previous sleep aids including melatonin caused jitteriness, while Tylenol PM was ineffective. He has also tried Ativan and Beta Blocker. 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 Sleep is poor if not "winding down" by bedtime.    MEDICAL HISTORY:  He has history of recurrent shingles affecting eye area occurring twice and annual walking pneumonia.    PREVENTIVE CARE:  Recent colonoscopy by Dr. Dickson revealed two small polyps which were removed, with overall normal results. He received COVID-19 booster and influenza vaccine at [facility].           Review of patient's allergies indicates:   Allergen Reactions    Adhesive Rash     Medical adhesive tape      Medication List with Changes/Refills   New Medications    ALLOPURINOL (ZYLOPRIM) 100 MG TABLET    Take 2 tablets (200 mg total) by mouth once daily.    AMITRIPTYLINE (ELAVIL) 10 MG TABLET    Take 1-2 tablets (10-20 mg total) by mouth nightly as needed for Insomnia.    SCOPOLAMINE (TRANSDERM-SCOP) 1.3-1.5 MG (1 MG OVER 3 DAYS)    Place 1 patch onto the " skin every 72 hours.    TRAZODONE (DESYREL) 50 MG TABLET    Take 1-2 tablets ( mg total) by mouth nightly as needed for Insomnia.   Changed and/or Refilled Medications    Modified Medication Previous Medication    DULOXETINE 40 MG CPDR DULoxetine (CYMBALTA) 20 MG capsule       Take 40 mg by mouth once daily.    Take 2 capsules by mouth once daily.    LISINOPRIL (PRINIVIL,ZESTRIL) 20 MG TABLET lisinopriL (PRINIVIL,ZESTRIL) 20 MG tablet       Take 1 tablet (20 mg total) by mouth nightly.    Take 20 mg by mouth nightly.   Discontinued Medications    CYCLOBENZAPRINE (FLEXERIL) 10 MG TABLET    Take 10 mg by mouth.    DIPHENHYDRAMINE-ACETAMINOPHEN (TYLENOL PM)  MG TAB    Take 1 tablet by mouth once daily.         Health Maintenance         Date Due Completion Date    Colorectal Cancer Screening Never done ---    Shingles Vaccine (1 of 2) Never done ---    Hemoglobin A1c (Diabetic Prevention Screening) 10/11/2026 10/11/2023    Lipid Panel 02/11/2030 2/11/2025    TETANUS VACCINE 06/12/2034 6/12/2024    RSV Vaccine (Age 60+ and Pregnant patients) (1 - 1-dose 75+ series) 06/25/2043 ---          Patient ambulates on his own without an assistive device.     We encourage you to start exercising if you do not already, continue at your current level or increase your level of activity.     Have you often been bothered by feeling down, depressed, or hopeless?  Not at all      Review of Systems   Constitutional:  Negative for activity change, chills, fever and unexpected weight change.   HENT:  Positive for nasal congestion. Negative for hearing loss, rhinorrhea and trouble swallowing.    Eyes:  Negative for discharge and visual disturbance.   Respiratory:  Negative for cough, chest tightness, shortness of breath and wheezing.    Cardiovascular:  Negative for chest pain and palpitations.   Gastrointestinal:  Negative for blood in stool, constipation, diarrhea and vomiting.   Endocrine: Negative for polydipsia and  "polyuria.   Genitourinary:  Negative for difficulty urinating, hematuria and urgency.   Musculoskeletal:  Negative for arthralgias, joint swelling and neck pain.   Neurological:  Negative for dizziness, weakness and headaches.   Psychiatric/Behavioral:  Positive for dysphoric mood and sleep disturbance. Negative for confusion.      Objective:     /84 (BP Location: Left arm, Patient Position: Sitting)   Pulse 76   Temp 97.7 °F (36.5 °C) (Temporal)   Resp 18   Ht 5' 7" (1.702 m)   Wt (!) 157.9 kg (348 lb 1.7 oz)   SpO2 96%   BMI 54.52 kg/m²     Physical Exam  Vitals reviewed.   Constitutional:       Appearance: Normal appearance.   HENT:      Head: Normocephalic and atraumatic.      Nose:      Right Sinus: No maxillary sinus tenderness or frontal sinus tenderness.      Left Sinus: No maxillary sinus tenderness or frontal sinus tenderness.      Mouth/Throat:      Pharynx: Posterior oropharyngeal erythema present.   Cardiovascular:      Rate and Rhythm: Normal rate and regular rhythm.      Heart sounds: Normal heart sounds. No murmur heard.  Pulmonary:      Effort: Pulmonary effort is normal.      Breath sounds: Normal breath sounds. No wheezing.   Lymphadenopathy:      Cervical: Cervical adenopathy present.      Left cervical: Posterior cervical adenopathy present.   Skin:     General: Skin is warm and dry.   Neurological:      Mental Status: He is alert and oriented to person, place, and time.       BP Readings from Last 3 Encounters:   02/11/25 132/84   02/03/25 132/82   11/06/24 130/82     Wt Readings from Last 3 Encounters:   02/11/25 (!) 157.9 kg (348 lb 1.7 oz)   02/03/25 (!) 161.7 kg (356 lb 6.4 oz)   11/06/24 (!) 154.8 kg (341 lb 3.2 oz)       I reviewed and independently interpreted the labs and imaging below:  Last Labs:  Glucose   Date Value Ref Range Status   02/11/2025 86 70 - 110 mg/dL Final   03/06/2024 94 70 - 110 mg/dL Final     BUN   Date Value Ref Range Status   02/11/2025 21 (H) 6 - 20 " mg/dL Final   12/19/2024 13.3 7.0 - 25.0 mg/dL Final   12/18/2024 14.3 7.0 - 25.0 mg/dL Final   03/06/2024 20 6 - 20 mg/dL Final     Creatinine   Date Value Ref Range Status   02/11/2025 0.8 0.5 - 1.4 mg/dL Final   12/19/2024 0.73 0.70 - 1.40 mg/dL Final   12/18/2024 0.61 (L) 0.70 - 1.40 mg/dL Final   03/06/2024 0.8 0.5 - 1.4 mg/dL Final     Sodium   Date Value Ref Range Status   02/11/2025 135 (L) 136 - 145 mmol/L Final     Potassium   Date Value Ref Range Status   02/11/2025 4.6 3.5 - 5.1 mmol/L Final     AST   Date Value Ref Range Status   02/11/2025 30 10 - 40 U/L Final     ALT   Date Value Ref Range Status   02/11/2025 38 10 - 44 U/L Final     Cholesterol   Date Value Ref Range Status   02/11/2025 193 120 - 199 mg/dL Final     Comment:     The National Cholesterol Education Program (NCEP) has set the  following guidelines (reference ranges) for Cholesterol:  Optimal.....................<200 mg/dL  Borderline High.............200-239 mg/dL  High........................> or = 240 mg/dL     10/11/2023 187 120 - 199 mg/dL Final     Comment:     The National Cholesterol Education Program (NCEP) has set the  following guidelines (reference ranges) for Cholesterol:  Optimal.....................<200 mg/dL  Borderline High.............200-239 mg/dL  High........................> or = 240 mg/dL       Hemoglobin A1C   Date Value Ref Range Status   10/11/2023 5.1 4.0 - 5.6 % Final     Comment:     ADA Screening Guidelines:  5.7-6.4%  Consistent with prediabetes  >or=6.5%  Consistent with diabetes    High levels of fetal hemoglobin interfere with the HbA1C  assay. Heterozygous hemoglobin variants (HbS, HgC, etc)do  not significantly interfere with this assay.   However, presence of multiple variants may affect accuracy.       Lab Results   Component Value Date    WBC 11.22 02/11/2025    HGB 17.1 02/11/2025    HCT 55.0 (H) 02/11/2025     02/11/2025     Lab Results   Component Value Date    TSH 1.156 02/11/2025        Assessment and Plan:     Evaluated sleep issues and determined need for new CPAP machine due to motor life expiration  Considered sleep medication options, opting to try Trazodone first, then Amitriptyline if needed  Assessed nasal congestion during meals, recommending trial of antihistamine or Flonase before eating  Evaluated neck lump, determined likely benign (lymph node or sebaceous cyst) but will monitor for changes  Reviewed recent colonoscopy results from Dr. Dickson, noting normal findings with 2 small polyps removed    1. Encounter for annual health examination (Primary)    - CBC Auto Differential; Future  - Comprehensive Metabolic Panel; Future  - Lipid Panel; Future  - TSH; Future  - PSA, Screening; Future    2. Insomnia, unspecified type    Chronic, recently worse, trial Trazodone - then Elavil - if no improvement consider Lunesta     - Ambulatory referral/consult to Sleep Disorders; Future  - traZODone (DESYREL) 50 MG tablet; Take 1-2 tablets ( mg total) by mouth nightly as needed for Insomnia.  Dispense: 6 tablet; Refill: 0  - amitriptyline (ELAVIL) 10 MG tablet; Take 1-2 tablets (10-20 mg total) by mouth nightly as needed for Insomnia.  Dispense: 6 tablet; Refill: 0    3. Depression, unspecified depression type    Chronic, stable, continue current medication    - DULoxetine 40 mg CpDR; Take 40 mg by mouth once daily.  Dispense: 90 capsule; Refill: 3    4. Primary hypertension    Chronic, stable, continue current medication    - lisinopriL (PRINIVIL,ZESTRIL) 20 MG tablet; Take 1 tablet (20 mg total) by mouth nightly.  Dispense: 90 tablet; Refill: 3    5. Avascular necrosis of bone of left hip    S/P hip replacement with improved pain    6. Spinal stenosis of lumbar region without neurogenic claudication    S/P lumbar laminectomy with improved pain     7. Personal history of kidney stones    Chronic, stable, continue current medication    - allopurinoL (ZYLOPRIM) 100 MG tablet; Take 2 tablets  (200 mg total) by mouth once daily.  Dispense: 180 tablet; Refill: 3    8. JUSTIN (obstructive sleep apnea)    Chronic, stable, continue CPAP nightly      - CPAP FOR HOME USE  - CPAP/BIPAP SUPPLIES  - Ambulatory referral/consult to Sleep Disorders; Future    9. Counseling for travel    - scopolamine (TRANSDERM-SCOP) 1.3-1.5 mg (1 mg over 3 days); Place 1 patch onto the skin every 72 hours.  Dispense: 4 patch; Refill: 0    10. Class 3 severe obesity due to excess calories with serious comorbidity and body mass index (BMI) of 50.0 to 59.9 in adult    Chronic, stable    11. Need for vaccination    - pneumoc 20-eloy conj-dip cr(PF) (PREVNAR-20 (PF)) injection Syrg 0.5 mL    12. Encounter for prostate cancer screening    - PSA, Screening; Future        SHINGLES VACCINATION:  - Explained rationale for shingles vaccine, including its ability to lessen severity and reduce risk of postherpetic neuralgia.  - Discussed potential side effects of shingles vaccine, including arm soreness and feeling unwell the next day.    NECK LUMP:  - Follow up if neck lump becomes tender, painful, or changes in size for potential ultrasound.          This note was generated with the assistance of ambient listening technology. Verbal consent was obtained by the patient and accompanying visitor(s) for the recording of patient appointment to facilitate this note. I attest to having reviewed and edited the generated note for accuracy, though some syntax or spelling errors may persist. Please contact the author of this note for any clarification.

## 2025-02-12 ENCOUNTER — PATIENT OUTREACH (OUTPATIENT)
Dept: ADMINISTRATIVE | Facility: HOSPITAL | Age: 55
End: 2025-02-12
Payer: COMMERCIAL

## 2025-02-12 ENCOUNTER — TELEPHONE (OUTPATIENT)
Dept: INTERNAL MEDICINE | Facility: CLINIC | Age: 55
End: 2025-02-12
Payer: COMMERCIAL

## 2025-02-12 PROBLEM — M48.061 SPINAL STENOSIS OF LUMBAR REGION: Status: ACTIVE | Noted: 2024-11-14

## 2025-02-12 LAB
COMPLEXED PSA SERPL-MCNC: 2.5 NG/ML (ref 0–4)
TSH SERPL DL<=0.005 MIU/L-ACNC: 1.16 UIU/ML (ref 0.4–4)

## 2025-02-12 NOTE — TELEPHONE ENCOUNTER
Faxed CPAP ORDERS TO [facility], FAX # [phone]    REC. FAX CONFIRMATION.

## 2025-02-12 NOTE — LETTER
AUTHORIZATION FOR RELEASE OF   CONFIDENTIAL INFORMATION    Dear JARVIS,    We are seeing Chris Nava, date of birth 1970, in the clinic at [facility]. Chris Nava has an outstanding lab/procedure at the time we reviewed his chart. In order to help keep his health information updated, he has authorized us to request the following medical record(s):        (  )  MAMMOGRAM                                      (X)  COLONOSCOPY      (  )  PAP SMEAR                                          (  )  OUTSIDE LAB RESULTS     (  )  DEXA SCAN                                          (  )  EYE EXAM            (  )  FOOT EXAM                                          (  )  ENTIRE RECORD     (  )  OUTSIDE IMMUNIZATIONS                 (  )  _______________         Please fax records to, Shira Rodríguez [phone].     If you have any questions, please contact DUANE Ball at [phone].           Patient Name: Chris Nava  : 1970  Patient Phone #: [phone]

## 2025-02-13 ENCOUNTER — PATIENT MESSAGE (OUTPATIENT)
Dept: INTERNAL MEDICINE | Facility: CLINIC | Age: 55
End: 2025-02-13
Payer: COMMERCIAL

## 2025-02-14 ENCOUNTER — TELEPHONE (OUTPATIENT)
Dept: INTERNAL MEDICINE | Facility: CLINIC | Age: 55
End: 2025-02-14
Payer: COMMERCIAL

## 2025-02-14 NOTE — TELEPHONE ENCOUNTER
Rohan is calling for sleep study and office notes.  To be faxed.    He saw you last on 2/11/25  I don't see sleep study saved to media.    Did he give you copy when he saw you??

## 2025-02-14 NOTE — TELEPHONE ENCOUNTER
I called and told her we are faxing office note.  We don't have copy of sleep study.  (I told her dee stated)

## 2025-02-14 NOTE — TELEPHONE ENCOUNTER
----- Message from Mariposa sent at 2/14/2025 11:06 AM CST -----  Contact: Ms. Najera Phone [phone]  Ms. Najera [facility] would like for you to fax her the patients Baseline Sleep Study, and the patients Clinical notes to..      [facility], fax# [phone].

## 2025-02-14 NOTE — TELEPHONE ENCOUNTER
Patient did not bring sleep study with him -- I am not sure he has documentation of his diagnosis -- if he does then we can have him supply it to [facility]. If he does not I have already placed a referral to sleep medicine and he will need to repeat sleep study.

## 2025-02-16 DIAGNOSIS — G47.00 INSOMNIA, UNSPECIFIED TYPE: ICD-10-CM

## 2025-02-17 RX ORDER — TRAZODONE HYDROCHLORIDE 50 MG/1
100 TABLET ORAL NIGHTLY PRN
Qty: 60 TABLET | Refills: 6 | Status: SHIPPED | OUTPATIENT
Start: 2025-02-17 | End: 2026-02-17

## 2025-02-17 NOTE — TELEPHONE ENCOUNTER
Refill Routing Note   Medication(s) are not appropriate for processing by [facility] for the following reason(s):        Non-participating provider    [facility] action(s):  Route               Appointments  past 12m or future 3m with PCP    Date Provider   Last Visit   2/11/2025 Shira Rodríguez NP   Next Visit   Visit date not found Shira Rodríguez NP   ED visits in past 90 days: 0        Note composed:11:18 AM 02/17/2025

## 2025-02-21 NOTE — PROGRESS NOTES
[facility] - Med Surg  Infectious Disease  Progress Note    Patient Name: Chris Nava  MRN: [id number]  Admission Date: 1/17/2024  Length of Stay: 3 days  Attending Physician: Gayle Ross MD  Primary Care Provider: Shira Rodríguez NP    Isolation Status: No active isolations  Assessment/Plan:      ID  * Abscess of finger of right hand  S/P I&D on 1/17. Cultures with S aureus.  Continue antibiotics as in cellulitis.  Will need to monitor response over the next 24 hours to ensure clinical improvement prior to discharge.     Cellulitis of finger of right hand  I have reviewed hospital notes from  Orthopedic Surgery service and other specialty providers. I have also reviewed CBC, CMP/BMP,  cultures and imaging with my interpretation as documented.     Cellulitis and abscess of the right fourth finger extending into the hand and wrist. Concern for Staphylococcal infection in the setting of purulent cellulitis despite negative gram stain. Post rounds cultures with S aureus pending susceptibilities. Patient reporting worsening swelling and dark discoloration of the finger this afternoon. Leukocytosis slightly down trending. CRP increase noted.  Continue cefazolin 2 gm every 8 hours while admitted.  Can transition to cefadroxil 1,000 mg BID or Keflex 500 mg PO every 6 hours for 14 days.   Will arrange ID clinic follow up.  Discussed management plan with the staff and/or members from Orthopedic Surgery service.          Anticipated Disposition: per primary    Thank you for your consult. I will sign off. Please contact us if you have any additional questions.    Bindu Dumont MD  Infectious Disease  [facility] - Med Surg    50 minutes of total time spent on the encounter, which includes face to face time and non-face to face time preparing to see the patient (eg, review of tests), obtaining and/or reviewing separately obtained history, documenting clinical information in the electronic or other health  "record, independently interpreting results (not separately reported) and communicating results to the patient/family/caregiver, or care coordination (not separately reported).     Subjective:     Principal Problem:Abscess of finger of right hand    HPI: A 53-year-old man whom two days prior to admission developed swelling and erythema to his right 4th finger. He noted a small pustule and attempted to self treat by using tweezers. Unfortunately, he developed progressively worsening swelling, redness and dark discoloration of his finger and extending over the dorsum of his hand. He sought care in [facility] ED due to pain, swelling and erythema. On evaluation he was afebrile and with leukocytosis. He was evaluated by Orthopedic Surgery and underwent I&D of an abscess. He was admitted and started on empiric cefepime, Flagyl, and vancomycin while awaiting culture results.     Mr. Nava has NKDA. He is a . He denies fresh water and shellfish exposure.     Infectious Diseases consulted for "hand infection"      Interval History: "OK. Not sure how it is doing today". No acute overnight events. Leukocytosis improving. Wound culture with MSSA. S/P debridement # 2 on 1/19 with drainage or purulent material. Active ROM limited.     Review of Systems   Constitutional:  Negative for chills, fatigue and fever.   HENT:  Negative for ear pain, mouth sores, nosebleeds, postnasal drip, rhinorrhea, sinus pressure, sore throat, tinnitus, trouble swallowing and voice change.    Eyes:  Negative for photophobia, pain, redness and visual disturbance.   Respiratory:  Negative for apnea, cough, chest tightness, shortness of breath and wheezing.    Cardiovascular:  Negative for chest pain, palpitations and leg swelling.   Gastrointestinal:  Negative for abdominal pain, blood in stool, constipation, diarrhea, nausea and vomiting.   Endocrine: Negative for cold intolerance, heat intolerance, polydipsia and polyuria.   Genitourinary:  Negative " for decreased urine volume, difficulty urinating, dysuria, flank pain, frequency, genital sores, hematuria, penile discharge, penile pain, penile swelling, scrotal swelling, testicular pain and urgency.   Musculoskeletal:  Negative for arthralgias, back pain, joint swelling, myalgias and neck pain.   Skin:  Positive for color change and wound. Negative for rash.   Allergic/Immunologic: Negative for environmental allergies and food allergies.   Neurological:  Negative for dizziness, seizures, syncope, weakness, light-headedness, numbness and headaches.   Hematological:  Negative for adenopathy. Does not bruise/bleed easily.   Psychiatric/Behavioral:  Negative for agitation, confusion, decreased concentration, hallucinations, self-injury, sleep disturbance and suicidal ideas. The patient is not nervous/anxious.      Objective:     Vital Signs (Most Recent):  Temp: 97.8 °F (36.6 °C) (01/21/24 1118)  Pulse: 74 (01/21/24 1118)  Resp: 16 (01/21/24 1118)  BP: 124/78 (01/21/24 1118)  SpO2: 98 % (01/21/24 1118) Vital Signs (24h Range):  Temp:  [96.2 °F (35.7 °C)-98.8 °F (37.1 °C)] 97.8 °F (36.6 °C)  Pulse:  [73-78] 74  Resp:  [16-19] 16  SpO2:  [94 %-99 %] 98 %  BP: (105-143)/(59-88) 124/78     Weight: (!) 138.7 kg (305 lb 11.2 oz)  Body mass index is 45.14 kg/m².    Estimated Creatinine Clearance: 192.6 mL/min (based on SCr of 0.6 mg/dL).     Physical Exam  Vitals and nursing note reviewed.   Constitutional:       Appearance: He is well-developed.   HENT:      Head: Normocephalic and atraumatic.   Eyes:      General: No scleral icterus.        Right eye: No discharge.         Left eye: No discharge.      Conjunctiva/sclera: Conjunctivae normal.   Pulmonary:      Effort: Pulmonary effort is normal.   Musculoskeletal:         General: Normal range of motion.      Comments: Right fourth finger with decreased erythema. Area of maceration at site if I&D with purulent material noted. Nodular swelling decreased.   Skin:      "General: Skin is warm and dry.   Neurological:      Mental Status: He is alert and oriented to person, place, and time.   Psychiatric:         Behavior: Behavior normal.         Thought Content: Thought content normal.         Judgment: Judgment normal.          Significant Labs: BMP:   No results for input(s): "GLU", "NA", "K", "CL", "CO2", "BUN", "CREATININE", "CALCIUM", "MG" in the last 48 hours.    CBC:   No results for input(s): "WBC", "HGB", "HCT", "PLT" in the last 48 hours.    Microbiology Results (last 7 days)       Procedure Component Value Units Date/Time    Blood Culture #1 **CANNOT BE ORDERED STAT** [[id number]] Collected: 01/17/24 1403    Order Status: Completed Specimen: Blood from Peripheral, Antecubital, Left Updated: 01/20/24 1612     Blood Culture, Routine No Growth to date      No Growth to date      No Growth to date      No Growth to date    Blood Culture #2 **CANNOT BE ORDERED STAT** [[id number]] Collected: 01/17/24 1403    Order Status: Completed Specimen: Blood from Peripheral, Antecubital, Right Updated: 01/20/24 1612     Blood Culture, Routine No Growth to date      No Growth to date      No Growth to date      No Growth to date    Aerobic culture [[id number]]  (Abnormal)  (Susceptibility) Collected: 01/17/24 1604    Order Status: Completed Specimen: Abscess from Finger, Right Hand Updated: 01/19/24 1359     Aerobic Bacterial Culture STAPHYLOCOCCUS AUREUS  Many      Narrative:      Right Ring Finger    Culture, Anaerobic [[id number]] Collected: 01/17/24 1604    Order Status: Completed Specimen: Abscess from Finger, Right Hand Updated: 01/19/24 1057     Anaerobic Culture Culture in progress    Narrative:      Right Ring Finger    AFB Culture & Smear [[id number]] Collected: 01/17/24 1604    Order Status: Completed Specimen: Abscess from Finger, Right Hand Updated: 01/18/24 2128     AFB Culture & Smear Culture in progress     AFB CULTURE STAIN No acid fast bacilli seen.    Narrative:      " Right Ring Finger    Gram stain [[id number]] Collected: 01/17/24 1604    Order Status: Completed Specimen: Abscess from Finger, Right Hand Updated: 01/17/24 1828     Gram Stain Result Rare WBC's      No organisms seen    Narrative:      Right Ring Finger    Fungus culture [[id number]] Collected: 01/17/24 1604    Order Status: Sent Specimen: Abscess from Finger, Right Hand Updated: 01/17/24 2892            Significant Imaging: I have reviewed all pertinent imaging results/findings within the past 24 hours.     Fall

## 2025-03-29 ENCOUNTER — PATIENT MESSAGE (OUTPATIENT)
Dept: INTERNAL MEDICINE | Facility: CLINIC | Age: 55
End: 2025-03-29
Payer: COMMERCIAL

## 2025-03-29 DIAGNOSIS — G47.33 OSA (OBSTRUCTIVE SLEEP APNEA): ICD-10-CM

## 2025-03-29 DIAGNOSIS — E66.813 CLASS 3 SEVERE OBESITY DUE TO EXCESS CALORIES WITH SERIOUS COMORBIDITY AND BODY MASS INDEX (BMI) OF 50.0 TO 59.9 IN ADULT: Primary | ICD-10-CM

## 2025-03-29 DIAGNOSIS — I10 PRIMARY HYPERTENSION: ICD-10-CM

## 2025-03-29 DIAGNOSIS — E66.01 CLASS 3 SEVERE OBESITY DUE TO EXCESS CALORIES WITH SERIOUS COMORBIDITY AND BODY MASS INDEX (BMI) OF 50.0 TO 59.9 IN ADULT: Primary | ICD-10-CM

## 2025-03-31 NOTE — TELEPHONE ENCOUNTER
LOV 02/11/25      Pt is asking for medication to be changed to Zepound for sleep apnea. Pt is asking for it to be prescribed via last sleep study. No sleep study in chart.     Messaged pt about last time sleep study was done.

## 2025-04-01 ENCOUNTER — TELEPHONE (OUTPATIENT)
Dept: INTERNAL MEDICINE | Facility: CLINIC | Age: 55
End: 2025-04-01

## 2025-04-01 RX ORDER — TIRZEPATIDE 2.5 MG/.5ML
2.5 INJECTION, SOLUTION SUBCUTANEOUS
Qty: 2 ML | Refills: 0 | Status: SHIPPED | OUTPATIENT
Start: 2025-04-01

## 2025-04-01 NOTE — TELEPHONE ENCOUNTER
Last visit 02/11/2025, annual.  The patient is requesting a Rx for Zepbound.  Reason;Zepbound is fully covered if it is prescribed for sleep apnea. I use a CPAP for sleep apnea.   Sleep study done a long time ago.    Please advise

## 2025-06-24 NOTE — TELEPHONE ENCOUNTER
----- Message from Jamaica Rodriguez sent at 11/9/2023 12:07 PM CST -----  Regarding: missed call       Who Called: SOREN Bella         Who Left Message for Patient: Shine         Does the patient know what this is regarding?Yes         Best Call Back Number: [phone] option 2,3         Additional Information:     
Spoke with [employer], patient was following up on a referral. Patient was already seen in the clinic as a new patient  
Nasal route as needed for Opioid Reversal 1 each 0    fluticasone furoate-vilanterol (BREO ELLIPTA) 200-25 MCG/ACT AEPB inhaler Inhale 1 puff into the lungs daily 1 each 11    albuterol sulfate HFA (PROAIR HFA) 108 (90 Base) MCG/ACT inhaler Inhale 2 puffs into the lungs every 6 hours as needed for Wheezing or Shortness of Breath 18 g 11    ipratropium-albuterol (DUONEB) 0.5-2.5 (3) MG/3ML SOLN nebulizer solution Take 1 aerosol every 4-6 hours as needed for shortness of breath coughing and wheezing 360 mL 0    aspirin 81 MG EC tablet Take 1 tablet by mouth in the morning. 30 tablet 3    albuterol sulfate  (90 Base) MCG/ACT inhaler Inhale 2 puffs into the lungs every 6 hours as needed for Shortness of Breath 1 each 11    Calcium Citrate-Vitamin D 500-500 MG-UNIT CHEW Take 1 tablet by mouth 2 times daily      ONE TOUCH LANCETS MISC 1 each by Does not apply route daily 100 each 3    Cholecalciferol (VITAMIN D) 2000 units TABS tablet Take 1 tablet by mouth daily 30 tablet     cetirizine (ZYRTEC) 10 MG tablet Take 1 tablet by mouth daily as needed for Allergies      Nebulizers (COMPRESSOR/NEBULIZER) MISC 1 Units by Does not apply route 4 times daily 1 each 3    denosumab (PROLIA) 60 MG/ML SOSY SC injection Inject 1 mL into the skin once for 1 dose 1 mL 0     No current facility-administered medications for this visit.       General Goals of current treatment regimen include improvement in pain, restoration of functioning- with focus on improvement in physical performance, general activity, work or disability,emotional distress, health care utilization and  decreased medication consumption.   Will continue to monitor progress towards achieving/maintaining therapeutic goals with special emphasis on  1. -Improvement in perceived interfernce  of pain with ADL's. YES  -Ability to do home exercises independently. YES  -Ability to do household chores, indoor work and social and leisure activities. YES  -Improve

## (undated) DEVICE — CARTRIDGE OIL

## (undated) DEVICE — KIT TOTAL HIP HPOFH OMC

## (undated) DEVICE — KIT IRR SUCTION HND PIECE

## (undated) DEVICE — DRAPE TOP 53X102IN

## (undated) DEVICE — SUT MONOCYRL 4-0 PS2 UND

## (undated) DEVICE — SPONGE COTTON TRAY 4X4IN

## (undated) DEVICE — TOWEL OR XRAY WHITE 17X26IN

## (undated) DEVICE — SOCKINETTE IMPERVIOUS 12X48IN

## (undated) DEVICE — CONTAINER SPECIMEN OR STER 4OZ

## (undated) DEVICE — SUT ETHIBOND EXCEL 5-0 V-40

## (undated) DEVICE — Device

## (undated) DEVICE — COVER CAMERA OPERATING ROOM

## (undated) DEVICE — DRAPE SURG W/TWL 17 5/8X23

## (undated) DEVICE — DRAPE U SPLIT SHEET 54X76IN

## (undated) DEVICE — SUT 1 36IN COATED VICRYL UN

## (undated) DEVICE — TAPE SILK 3IN

## (undated) DEVICE — COVER MAYO STND XL 30X57IN

## (undated) DEVICE — DRAPE INCISE IOBAN 2 23X33IN

## (undated) DEVICE — BLADE SAGITTAL 18 X 1.27 X 90M

## (undated) DEVICE — DRAPE THREE-QTR REINF 53X77IN

## (undated) DEVICE — HOOD T7 W/ PEEL AWAY LENS

## (undated) DEVICE — GLOVE BIOGEL SKINSENSE PI 8.0

## (undated) DEVICE — DRESSING TELFA N ADH 3X8

## (undated) DEVICE — DRESSING AQUACEL AG RBBN 2X45

## (undated) DEVICE — SUT 2/0 36IN COATED VICRYL

## (undated) DEVICE — GOWN SMARTGOWN 3XL XLONG

## (undated) DEVICE — ADHESIVE DERMABOND ADVANCED

## (undated) DEVICE — BNDG COFLEX FOAM LF2 ST 4X5YD

## (undated) DEVICE — COVER LIGHT HANDLE 80/CA

## (undated) DEVICE — SOL NACL IRR 3000ML

## (undated) DEVICE — NDL HYPO STD REG BVL 18GX1.5IN

## (undated) DEVICE — SOL BETADINE 5%

## (undated) DEVICE — UNDERGLOVES BIOGEL PI SIZE 8.5

## (undated) DEVICE — BLADE SAW REC 22.5X0.64 SS STE

## (undated) DEVICE — MARKER SKIN RULER STERILE

## (undated) DEVICE — ELECTRODE REM PLYHSV RETURN 9

## (undated) DEVICE — DIFFUSER